# Patient Record
Sex: FEMALE | Race: WHITE | NOT HISPANIC OR LATINO | Employment: OTHER | URBAN - NONMETROPOLITAN AREA
[De-identification: names, ages, dates, MRNs, and addresses within clinical notes are randomized per-mention and may not be internally consistent; named-entity substitution may affect disease eponyms.]

---

## 2017-01-17 ENCOUNTER — APPOINTMENT (OUTPATIENT)
Dept: GENERAL RADIOLOGY | Facility: HOSPITAL | Age: 67
End: 2017-01-17

## 2017-01-17 ENCOUNTER — HOSPITAL ENCOUNTER (EMERGENCY)
Facility: HOSPITAL | Age: 67
Discharge: HOME | End: 2017-01-18
Attending: EMERGENCY MEDICINE | Admitting: EMERGENCY MEDICINE

## 2017-01-17 DIAGNOSIS — K29.00 OTHER ACUTE GASTRITIS: Primary | ICD-10-CM

## 2017-01-17 LAB
ALBUMIN SERPL-MCNC: 4.8 G/DL (ref 3.4–4.8)
ALBUMIN/GLOB SERPL: 1.4 G/DL (ref 1.5–2.5)
ALP SERPL-CCNC: 110 U/L (ref 46–116)
ALT SERPL W P-5'-P-CCNC: 10 U/L (ref 10–36)
AMYLASE SERPL-CCNC: 151 U/L (ref 28–100)
ANION GAP SERPL CALCULATED.3IONS-SCNC: 9.5 MMOL/L (ref 3.6–11.2)
ANISOCYTOSIS BLD QL: NORMAL
APTT PPP: 23.5 SECONDS (ref 24.4–31)
AST SERPL-CCNC: 23 U/L (ref 10–30)
BACTERIA UR QL AUTO: ABNORMAL /HPF
BASOPHILS # BLD AUTO: 0.05 10*3/MM3 (ref 0–0.3)
BASOPHILS NFR BLD AUTO: 0.5 % (ref 0–2)
BILIRUB SERPL-MCNC: 0.3 MG/DL (ref 0.2–1.8)
BILIRUB UR QL STRIP: NEGATIVE
BNP SERPL-MCNC: 25.7 PG/ML (ref 0–100)
BUN BLD-MCNC: 14 MG/DL (ref 7–21)
BUN/CREAT SERPL: 16.5 (ref 7–25)
CALCIUM SPEC-SCNC: 9.9 MG/DL (ref 7.7–10)
CHLORIDE SERPL-SCNC: 106 MMOL/L (ref 99–112)
CK MB SERPL-CCNC: 0.69 NG/ML (ref 0–5)
CK MB SERPL-CCNC: 0.72 NG/ML (ref 0–5)
CK MB SERPL-RTO: 1.4 % (ref 0–3)
CK MB SERPL-RTO: 1.7 % (ref 0–3)
CK SERPL-CCNC: 42 U/L (ref 24–173)
CK SERPL-CCNC: 50 U/L (ref 24–173)
CLARITY UR: CLEAR
CO2 SERPL-SCNC: 32.5 MMOL/L (ref 24.3–31.9)
COLOR UR: YELLOW
CREAT BLD-MCNC: 0.85 MG/DL (ref 0.43–1.29)
DEPRECATED RDW RBC AUTO: 47.9 FL (ref 37–54)
EOSINOPHIL # BLD AUTO: 0.08 10*3/MM3 (ref 0–0.7)
EOSINOPHIL NFR BLD AUTO: 0.8 % (ref 0–5)
ERYTHROCYTE [DISTWIDTH] IN BLOOD BY AUTOMATED COUNT: 15.1 % (ref 11.5–14.5)
GFR SERPL CREATININE-BSD FRML MDRD: 67 ML/MIN/1.73
GLOBULIN UR ELPH-MCNC: 3.4 GM/DL
GLUCOSE BLD-MCNC: 99 MG/DL (ref 70–110)
GLUCOSE UR STRIP-MCNC: NEGATIVE MG/DL
HCT VFR BLD AUTO: 41.8 % (ref 37–47)
HGB BLD-MCNC: 13.7 G/DL (ref 12–16)
HGB UR QL STRIP.AUTO: NEGATIVE
HYALINE CASTS UR QL AUTO: ABNORMAL /LPF
IMM GRANULOCYTES # BLD: 0.02 10*3/MM3 (ref 0–0.03)
IMM GRANULOCYTES NFR BLD: 0.2 % (ref 0–0.5)
INR PPP: 0.89 (ref 0.8–1.1)
KETONES UR QL STRIP: NEGATIVE
LEUKOCYTE ESTERASE UR QL STRIP.AUTO: ABNORMAL
LIPASE SERPL-CCNC: 46 U/L (ref 13–60)
LYMPHOCYTES # BLD AUTO: 2.57 10*3/MM3 (ref 1–3)
LYMPHOCYTES NFR BLD AUTO: 24.3 % (ref 21–51)
MCH RBC QN AUTO: 28.4 PG (ref 27–33)
MCHC RBC AUTO-ENTMCNC: 32.8 G/DL (ref 33–37)
MCV RBC AUTO: 86.7 FL (ref 80–94)
MONOCYTES # BLD AUTO: 0.66 10*3/MM3 (ref 0.1–0.9)
MONOCYTES NFR BLD AUTO: 6.2 % (ref 0–10)
MYOGLOBIN SERPL-MCNC: 25 NG/ML (ref 0–109)
MYOGLOBIN SERPL-MCNC: 28 NG/ML (ref 0–109)
NEUTROPHILS # BLD AUTO: 7.21 10*3/MM3 (ref 1.4–6.5)
NEUTROPHILS NFR BLD AUTO: 68 % (ref 30–70)
NITRITE UR QL STRIP: NEGATIVE
NRBC BLD MANUAL-RTO: 0 /100 WBC (ref 0–0)
OSMOLALITY SERPL CALC.SUM OF ELEC: 294.8 MOSM/KG (ref 273–305)
PH UR STRIP.AUTO: 6 [PH] (ref 5–8)
PLAT MORPH BLD: NORMAL
PLATELET # BLD AUTO: 221 10*3/MM3 (ref 130–400)
PMV BLD AUTO: 14.3 FL (ref 6–10)
POTASSIUM BLD-SCNC: 3 MMOL/L (ref 3.5–5.3)
PROT SERPL-MCNC: 8.2 G/DL (ref 6–8)
PROT UR QL STRIP: NEGATIVE
PROTHROMBIN TIME: 10.1 SECONDS (ref 9.8–11.9)
RBC # BLD AUTO: 4.82 10*6/MM3 (ref 4.2–5.4)
RBC # UR: ABNORMAL /HPF
REF LAB TEST METHOD: ABNORMAL
SODIUM BLD-SCNC: 148 MMOL/L (ref 135–153)
SP GR UR STRIP: 1.01 (ref 1–1.03)
SQUAMOUS #/AREA URNS HPF: ABNORMAL /HPF
TROPONIN I SERPL-MCNC: <0.006 NG/ML
TROPONIN I SERPL-MCNC: <0.006 NG/ML
UROBILINOGEN UR QL STRIP: ABNORMAL
WBC NRBC COR # BLD: 10.59 10*3/MM3 (ref 4.5–12.5)
WBC UR QL AUTO: ABNORMAL /HPF

## 2017-01-17 PROCEDURE — 83874 ASSAY OF MYOGLOBIN: CPT | Performed by: EMERGENCY MEDICINE

## 2017-01-17 PROCEDURE — 87086 URINE CULTURE/COLONY COUNT: CPT | Performed by: EMERGENCY MEDICINE

## 2017-01-17 PROCEDURE — 25010000002 ONDANSETRON PER 1 MG: Performed by: EMERGENCY MEDICINE

## 2017-01-17 PROCEDURE — 84484 ASSAY OF TROPONIN QUANT: CPT | Performed by: EMERGENCY MEDICINE

## 2017-01-17 PROCEDURE — 93010 ELECTROCARDIOGRAM REPORT: CPT | Performed by: INTERNAL MEDICINE

## 2017-01-17 PROCEDURE — 82553 CREATINE MB FRACTION: CPT | Performed by: EMERGENCY MEDICINE

## 2017-01-17 PROCEDURE — 36415 COLL VENOUS BLD VENIPUNCTURE: CPT

## 2017-01-17 PROCEDURE — 80053 COMPREHEN METABOLIC PANEL: CPT | Performed by: EMERGENCY MEDICINE

## 2017-01-17 PROCEDURE — 71010 XR CHEST 1 VW: CPT | Performed by: RADIOLOGY

## 2017-01-17 PROCEDURE — 85610 PROTHROMBIN TIME: CPT | Performed by: EMERGENCY MEDICINE

## 2017-01-17 PROCEDURE — 93005 ELECTROCARDIOGRAM TRACING: CPT | Performed by: EMERGENCY MEDICINE

## 2017-01-17 PROCEDURE — 83690 ASSAY OF LIPASE: CPT | Performed by: EMERGENCY MEDICINE

## 2017-01-17 PROCEDURE — 82150 ASSAY OF AMYLASE: CPT | Performed by: EMERGENCY MEDICINE

## 2017-01-17 PROCEDURE — 85730 THROMBOPLASTIN TIME PARTIAL: CPT | Performed by: EMERGENCY MEDICINE

## 2017-01-17 PROCEDURE — 85007 BL SMEAR W/DIFF WBC COUNT: CPT | Performed by: EMERGENCY MEDICINE

## 2017-01-17 PROCEDURE — 71010 HC CHEST PA OR AP: CPT

## 2017-01-17 PROCEDURE — 82550 ASSAY OF CK (CPK): CPT | Performed by: EMERGENCY MEDICINE

## 2017-01-17 PROCEDURE — 99285 EMERGENCY DEPT VISIT HI MDM: CPT

## 2017-01-17 PROCEDURE — 85025 COMPLETE CBC W/AUTO DIFF WBC: CPT | Performed by: EMERGENCY MEDICINE

## 2017-01-17 PROCEDURE — 96374 THER/PROPH/DIAG INJ IV PUSH: CPT

## 2017-01-17 PROCEDURE — 81001 URINALYSIS AUTO W/SCOPE: CPT | Performed by: EMERGENCY MEDICINE

## 2017-01-17 PROCEDURE — 83880 ASSAY OF NATRIURETIC PEPTIDE: CPT | Performed by: EMERGENCY MEDICINE

## 2017-01-17 RX ORDER — PHENOBARBITAL, HYOSCYAMINE SULFATE, ATROPINE SULFATE AND SCOPOLAMINE HYDROBROMIDE .0194; .1037; 16.2; .0065 MG/1; MG/1; MG/1; MG/1
1 TABLET ORAL ONCE
Status: COMPLETED | OUTPATIENT
Start: 2017-01-17 | End: 2017-01-17

## 2017-01-17 RX ORDER — SODIUM CHLORIDE 0.9 % (FLUSH) 0.9 %
10 SYRINGE (ML) INJECTION AS NEEDED
Status: DISCONTINUED | OUTPATIENT
Start: 2017-01-17 | End: 2017-01-18 | Stop reason: HOSPADM

## 2017-01-17 RX ORDER — MAGNESIUM HYDROXIDE/ALUMINUM HYDROXICE/SIMETHICONE 120; 1200; 1200 MG/30ML; MG/30ML; MG/30ML
30 SUSPENSION ORAL ONCE
Status: COMPLETED | OUTPATIENT
Start: 2017-01-17 | End: 2017-01-17

## 2017-01-17 RX ORDER — ONDANSETRON 2 MG/ML
4 INJECTION INTRAMUSCULAR; INTRAVENOUS ONCE
Status: COMPLETED | OUTPATIENT
Start: 2017-01-17 | End: 2017-01-17

## 2017-01-17 RX ORDER — ASPIRIN 81 MG/1
324 TABLET, CHEWABLE ORAL ONCE
Status: COMPLETED | OUTPATIENT
Start: 2017-01-17 | End: 2017-01-17

## 2017-01-17 RX ADMIN — ASPIRIN 324 MG: 81 TABLET, CHEWABLE ORAL at 20:25

## 2017-01-17 RX ADMIN — PHENOBARBITAL, HYOSCYAMINE SULFATE, ATROPINE SULFATE, SCOPOLAMINE HYDROBROMIDE 16.2 MG: 16.2; .1037; .0194; .0065 TABLET ORAL at 20:26

## 2017-01-17 RX ADMIN — LIDOCAINE HYDROCHLORIDE 15 ML: 20 SOLUTION ORAL; TOPICAL at 20:26

## 2017-01-17 RX ADMIN — ALUMINUM HYDROXIDE, MAGNESIUM HYDROXIDE, AND SIMETHICONE 30 ML: 200; 200; 20 SUSPENSION ORAL at 20:25

## 2017-01-17 RX ADMIN — ONDANSETRON 4 MG: 2 INJECTION, SOLUTION INTRAMUSCULAR; INTRAVENOUS at 20:25

## 2017-01-18 VITALS
TEMPERATURE: 98.9 F | SYSTOLIC BLOOD PRESSURE: 133 MMHG | RESPIRATION RATE: 18 BRPM | WEIGHT: 134 LBS | DIASTOLIC BLOOD PRESSURE: 85 MMHG | BODY MASS INDEX: 26.31 KG/M2 | OXYGEN SATURATION: 97 % | HEART RATE: 75 BPM | HEIGHT: 60 IN

## 2017-01-18 RX ORDER — FAMOTIDINE 20 MG/1
20 TABLET, FILM COATED ORAL NIGHTLY
Qty: 30 TABLET | Refills: 0 | Status: SHIPPED | OUTPATIENT
Start: 2017-01-18 | End: 2017-05-16 | Stop reason: SDDI

## 2017-01-18 NOTE — ED PROVIDER NOTES
Subjective   HPI Comments: Patient comes in with complaint of shortness of breath and chest pain.  She has had malaise fatigue and shortness of breath for the past week.  She grew concerned when she started having  Epigastric chest pain.  It does radiate through to her left scapula.  Patient notes that she has had a fever of 98 or 99.  She denies cough.  She denies nausea vomiting diarrhea.  She denies injury.    Patient is a 66 y.o. female presenting with chest pain.   History provided by:  Patient  Chest Pain   Pain location:  L chest  Pain quality: aching    Pain radiates to:  Does not radiate  Onset quality:  Sudden  Progression:  Worsening  Chronicity:  Recurrent  Context: not breathing, not drug use, not eating, not intercourse, not lifting, not movement, not raising an arm, not at rest, not stress and not trauma    Relieved by:  Nothing  Worsened by:  Nothing  Ineffective treatments:  None tried  Associated symptoms: fatigue, nausea, shortness of breath and weakness    Associated symptoms: no abdominal pain, no AICD problem, no altered mental status, no anorexia, no anxiety, no back pain, no claudication, no cough, no diaphoresis, no dizziness, no dysphagia, no fever, no headache, no heartburn, no lower extremity edema, no near-syncope, no numbness, no orthopnea, no palpitations, no PND, no syncope and no vomiting    Risk factors: coronary artery disease, diabetes mellitus, high cholesterol and hypertension    Risk factors: no aortic disease, no birth control, no Ranjith-Danlos syndrome, no immobilization, not male, no Marfan's syndrome, not obese, not pregnant, no prior DVT/PE, no smoking and no surgery        Review of Systems   Constitutional: Positive for fatigue. Negative for diaphoresis and fever.   HENT: Negative.  Negative for trouble swallowing.    Eyes: Negative.    Respiratory: Positive for shortness of breath. Negative for cough and chest tightness.    Cardiovascular: Positive for chest pain.  Negative for palpitations, orthopnea, claudication, syncope, PND and near-syncope.   Gastrointestinal: Positive for nausea. Negative for abdominal pain, anorexia, heartburn and vomiting.   Endocrine: Negative.    Genitourinary: Negative.    Musculoskeletal: Negative.  Negative for back pain.   Skin: Negative.    Allergic/Immunologic: Negative.    Neurological: Positive for weakness. Negative for dizziness, numbness and headaches.   Hematological: Negative.    Psychiatric/Behavioral: Negative.        Past Medical History   Diagnosis Date   • Aortic insufficiency    • Disease of thyroid gland    • Essential hypertension    • SOB (shortness of breath)        Allergies   Allergen Reactions   • Eggs Or Egg-Derived Products    • Iron        Past Surgical History   Procedure Laterality Date   • Thyroid surgery       age 16+ 17, for cysts   •  section       x 3    • Cardiac catheterization     • Cardiac catheterization  2015       Family History   Problem Relation Age of Onset   • Lung cancer Mother    • Stroke Father        Social History     Social History   • Marital status:      Spouse name: N/A   • Number of children: N/A   • Years of education: N/A     Social History Main Topics   • Smoking status: Never Smoker   • Smokeless tobacco: Never Used   • Alcohol use No   • Drug use: No   • Sexual activity: Not Asked     Other Topics Concern   • None     Social History Narrative           Objective   Physical Exam   Constitutional: She is oriented to person, place, and time. She appears well-developed and well-nourished. No distress.   HENT:   Head: Normocephalic and atraumatic.   Right Ear: External ear normal.   Nose: Nose normal.   Mouth/Throat: Oropharynx is clear and moist.   Eyes: Conjunctivae and EOM are normal. Right eye exhibits no discharge. Left eye exhibits no discharge. No scleral icterus.   Neck: Normal range of motion. No tracheal deviation present.   Cardiovascular: Normal rate, regular  rhythm and normal heart sounds.  Exam reveals no gallop and no friction rub.    No murmur heard.  Pulmonary/Chest: Effort normal and breath sounds normal. No stridor. No respiratory distress. She has no wheezes. She has no rales.   Abdominal: Soft. Bowel sounds are normal. She exhibits no distension and no mass. There is no tenderness. There is no guarding.   Musculoskeletal: Normal range of motion. She exhibits no edema, tenderness or deformity.   Lymphadenopathy:     She has no cervical adenopathy.   Neurological: She is alert and oriented to person, place, and time. She exhibits normal muscle tone. Coordination normal.   Skin: Skin is warm and dry. No pallor.   Psychiatric: Her behavior is normal. Judgment and thought content normal.   anxious   Nursing note and vitals reviewed.      Procedures         ED Course  ED Course   Value Comment By Time   ECG 12 Lead 12-lead EKG performed at 1959 hrs.  Interpreted by myself at 2002 hrs.  Sinus tachycardia.  100 bpm.  SC interval 177.  QRS duration 145.  QTc 460.  Left ventricular hypertrophy.  No pathologic blocks.  No overt dysrhythmia.  No evidence for STEMI. Los Davis MD 01/18 0144    Patient hemodynamically stable.  No diagnostic EKG nor serial cardiac enzyme results. Los Davis MD 01/18 0146      XR Chest 1 View   ED Interpretation   Single AP portable chest x-ray   My read   Mild vascular congestion.  No apparent acute infiltrate nor   injury.        Labs Reviewed   COMPREHENSIVE METABOLIC PANEL - Abnormal; Notable for the following:        Result Value    Potassium 3.0 (*)     CO2 32.5 (*)     Total Protein 8.2 (*)     A/G Ratio 1.4 (*)     All other components within normal limits   APTT - Abnormal; Notable for the following:     PTT 23.5 (*)     All other components within normal limits    Narrative:     Heparin protocol:    Therapeutic range 56-80 seconds   URINALYSIS W/ CULTURE IF INDICATED - Abnormal; Notable for the following:      Leuk Esterase, UA Trace (*)     All other components within normal limits   CBC WITH AUTO DIFFERENTIAL - Abnormal; Notable for the following:     MCHC 32.8 (*)     RDW 15.1 (*)     MPV 14.3 (*)     Neutrophils, Absolute 7.21 (*)     All other components within normal limits   AMYLASE - Abnormal; Notable for the following:     Amylase 151 (*)     All other components within normal limits   URINALYSIS, MICROSCOPIC ONLY - Abnormal; Notable for the following:     RBC, UA 0-2 (*)     WBC, UA 0-2 (*)     Bacteria, UA 1+ (*)     All other components within normal limits   PROTIME-INR - Normal    Narrative:     Patients not on anticoagulant therapy:    INR 0.90-1.10     Suggested INR therapeutic range for stable oral anticoagulant therapy:             Routine therapy                      2.00-3.00           Recurrent MI                         2.50-3.50           Mechanical prosthetic valve          2.50-3.50   CK - Normal   MYOGLOBIN, SERUM - Normal   CK MB - Normal   TROPONIN (IN-HOUSE) - Normal    Narrative:     Ultra Troponin I Reference Range:         <=0.039 ng/mL: Negative    0.04-0.779 ng/mL: Indeterminate Range. Suspicious of MI.  Clinical correlation required.       >=0.78  ng/mL: Consistent with myocardial injury.  Clinical correlation required.   BNP (IN-HOUSE) - Normal   LIPASE - Normal   OSMOLALITY, CALCULATED - Normal   CKMB INDEX CALCULATION - Normal   CK - Normal   MYOGLOBIN, SERUM - Normal   CK MB - Normal   TROPONIN (IN-HOUSE) - Normal    Narrative:     Ultra Troponin I Reference Range:         <=0.039 ng/mL: Negative    0.04-0.779 ng/mL: Indeterminate Range. Suspicious of MI.  Clinical correlation required.       >=0.78  ng/mL: Consistent with myocardial injury.  Clinical correlation required.   CKMB INDEX CALCULATION - Normal   URINE CULTURE   SCAN SLIDE   CBC AND DIFFERENTIAL    Narrative:     The following orders were created for panel order CBC & Differential.  Procedure                                Abnormality         Status                     ---------                               -----------         ------                     Scan Slide[[id number]]                                        Final result               CBC Auto Differential[[id number]]         Abnormal            Final result                 Please view results for these tests on the individual orders.        Medication List      START taking these medications          esomeprazole 20 MG capsule   Commonly known as:  NEXIUM   Take 1 capsule by mouth Every Morning Before Breakfast.       famotidine 20 MG tablet   Commonly known as:  PEPCID   Take 1 tablet by mouth Every Night.         CONTINUE taking these medications          cetirizine 10 MG tablet   Commonly known as:  zyrTEC       fluticasone-salmeterol 250-50 MCG/DOSE DISKUS   Commonly known as:  ADVAIR       hydrochlorothiazide 12.5 MG tablet   Commonly known as:  HYDRODIURIL       levothyroxine 25 MCG tablet   Commonly known as:  SYNTHROID, LEVOTHROID       losartan 50 MG tablet   Commonly known as:  COZAAR       metoprolol tartrate 25 MG tablet   Commonly known as:  LOPRESSOR       omeprazole 20 MG capsule   Commonly known as:  priLOSEC       pravastatin 10 MG tablet   Commonly known as:  PRAVACHOL               HEART Score  History: Slightly suspicious (+0)  ECG: Normal (+0)  Age: Greater than or equal to 65 (+2)  Risk Factors: 1 - 2 risk factors (+1)  Troponin: Normal limit or lower (+0)  Total: 3         MDM  Number of Diagnoses or Management Options  Other acute gastritis: new and requires workup     Amount and/or Complexity of Data Reviewed  Clinical lab tests: ordered and reviewed  Tests in the radiology section of CPT®: ordered and reviewed  Obtain history from someone other than the patient: yes  Independent visualization of images, tracings, or specimens: yes    Risk of Complications, Morbidity, and/or Mortality  Presenting problems: high  Diagnostic procedures: high  Management  options: moderate    Patient Progress  Patient progress: stable      Final diagnoses:   Other acute gastritis            Los Davis MD  01/18/17 0615

## 2017-01-19 ENCOUNTER — TELEPHONE (OUTPATIENT)
Dept: CARDIOLOGY | Facility: CLINIC | Age: 67
End: 2017-01-19

## 2017-01-19 NOTE — TELEPHONE ENCOUNTER
----- Message from John Banda MD sent at 1/19/2017 12:25 PM EST -----  That's okay  ----- Message -----     From: Lilo Posada MA     Sent: 1/19/2017  11:34 AM       To: John Banda MD    Pt wants to know if she can come in today and be seen? She states she went to the ER two days ago by ambulance with chest pain and they sent her home with Nexium and her pain is still consistent.

## 2017-01-20 ENCOUNTER — OFFICE VISIT (OUTPATIENT)
Dept: CARDIOLOGY | Facility: CLINIC | Age: 67
End: 2017-01-20

## 2017-01-20 ENCOUNTER — TELEPHONE (OUTPATIENT)
Dept: CARDIOLOGY | Facility: CLINIC | Age: 67
End: 2017-01-20

## 2017-01-20 VITALS
WEIGHT: 138.6 LBS | OXYGEN SATURATION: 98 % | HEIGHT: 60 IN | HEART RATE: 72 BPM | BODY MASS INDEX: 27.21 KG/M2 | DIASTOLIC BLOOD PRESSURE: 84 MMHG | SYSTOLIC BLOOD PRESSURE: 154 MMHG

## 2017-01-20 DIAGNOSIS — K85.00 IDIOPATHIC ACUTE PANCREATITIS, UNSPECIFIED COMPLICATION STATUS: ICD-10-CM

## 2017-01-20 DIAGNOSIS — I44.7 LBBB (LEFT BUNDLE BRANCH BLOCK): ICD-10-CM

## 2017-01-20 DIAGNOSIS — E03.4 HYPOTHYROIDISM DUE TO ACQUIRED ATROPHY OF THYROID: ICD-10-CM

## 2017-01-20 DIAGNOSIS — E87.6 HYPOKALEMIA: ICD-10-CM

## 2017-01-20 DIAGNOSIS — R07.2 PRECORDIAL PAIN: ICD-10-CM

## 2017-01-20 DIAGNOSIS — I10 ESSENTIAL HYPERTENSION: ICD-10-CM

## 2017-01-20 DIAGNOSIS — R07.9 CHEST PAIN IN ADULT: Primary | ICD-10-CM

## 2017-01-20 DIAGNOSIS — I38 VALVULAR HEART DISEASE: ICD-10-CM

## 2017-01-20 DIAGNOSIS — E78.2 MIXED HYPERLIPIDEMIA: ICD-10-CM

## 2017-01-20 LAB
BACTERIA SPEC AEROBE CULT: NORMAL
CHOLEST SERPL-MCNC: 219 MG/DL (ref 0–200)
CK SERPL-CCNC: 53 U/L (ref 24–173)
HDLC SERPL-MCNC: 87 MG/DL (ref 60–100)
LDLC SERPL CALC-MCNC: 110 MG/DL (ref 0–100)
LDLC/HDLC SERPL: 1.27 {RATIO}
LIPASE SERPL-CCNC: 83 U/L (ref 13–60)
T4 FREE SERPL-MCNC: 1.51 NG/DL (ref 0.89–1.76)
TRIGL SERPL-MCNC: 108 MG/DL (ref 0–150)
TROPONIN I SERPL-MCNC: <0.006 NG/ML
TSH SERPL DL<=0.05 MIU/L-ACNC: 1.27 MIU/ML (ref 0.55–4.78)
VLDLC SERPL-MCNC: 21.6 MG/DL

## 2017-01-20 PROCEDURE — 84443 ASSAY THYROID STIM HORMONE: CPT | Performed by: INTERNAL MEDICINE

## 2017-01-20 PROCEDURE — 84484 ASSAY OF TROPONIN QUANT: CPT | Performed by: INTERNAL MEDICINE

## 2017-01-20 PROCEDURE — 93000 ELECTROCARDIOGRAM COMPLETE: CPT | Performed by: INTERNAL MEDICINE

## 2017-01-20 PROCEDURE — 83690 ASSAY OF LIPASE: CPT | Performed by: INTERNAL MEDICINE

## 2017-01-20 PROCEDURE — 99214 OFFICE O/P EST MOD 30 MIN: CPT | Performed by: INTERNAL MEDICINE

## 2017-01-20 PROCEDURE — 82550 ASSAY OF CK (CPK): CPT | Performed by: INTERNAL MEDICINE

## 2017-01-20 PROCEDURE — 84439 ASSAY OF FREE THYROXINE: CPT | Performed by: INTERNAL MEDICINE

## 2017-01-20 PROCEDURE — 80061 LIPID PANEL: CPT | Performed by: INTERNAL MEDICINE

## 2017-01-20 RX ORDER — LOSARTAN POTASSIUM 25 MG/1
25 TABLET ORAL DAILY
Qty: 30 TABLET | Refills: 2 | Status: SHIPPED | OUTPATIENT
Start: 2017-01-20 | End: 2017-02-28

## 2017-01-20 RX ORDER — POTASSIUM CHLORIDE 750 MG/1
10 TABLET, FILM COATED, EXTENDED RELEASE ORAL DAILY
Qty: 30 TABLET | Refills: 0 | Status: SHIPPED | OUTPATIENT
Start: 2017-01-20 | End: 2017-05-16

## 2017-01-20 RX ORDER — PRAVASTATIN SODIUM 20 MG
20 TABLET ORAL DAILY
Qty: 90 TABLET | Refills: 0 | Status: SHIPPED | OUTPATIENT
Start: 2017-01-20 | End: 2017-02-28

## 2017-01-20 RX ORDER — LEVOTHYROXINE SODIUM 0.03 MG/1
25 TABLET ORAL DAILY
Qty: 90 TABLET | Refills: 0 | Status: SHIPPED | OUTPATIENT
Start: 2017-01-20 | End: 2017-04-28 | Stop reason: SDUPTHER

## 2017-01-20 RX ORDER — HYDROCHLOROTHIAZIDE 12.5 MG/1
12.5 TABLET ORAL DAILY
Qty: 90 TABLET | Refills: 0 | Status: CANCELLED | OUTPATIENT
Start: 2017-01-20

## 2017-01-20 NOTE — MR AVS SNAPSHOT
Chasity Campos   1/20/2017 9:15 AM   Office Visit    Dept Phone:  [phone]   Encounter #:  [id number]    Provider:  John Banda MD   Department:  [facility] CARDIOLOGY                Your Full Care Plan              Today's Medication Changes          These changes are accurate as of: 1/20/17 10:18 AM.  If you have any questions, ask your nurse or doctor.               New Medication(s)Ordered:     potassium chloride 10 MEQ CR tablet   Commonly known as:  K-DUR   Take 1 tablet by mouth Daily.   Started by:  John Banda MD         Medication(s)that have changed:     losartan 25 MG tablet   Commonly known as:  COZAAR   Take 1 tablet by mouth Daily.   What changed:    - medication strength  - how much to take  - when to take this   Changed by:  John Banda MD         Stop taking medication(s)listed here:     hydrochlorothiazide 12.5 MG tablet   Commonly known as:  HYDRODIURIL   Stopped by:  John Banda MD                Where to Get Your Medications      These medications were sent to [facility] [address] - [phone]  - [phone] [address] [address]     Phone:  [phone]     levothyroxine 25 MCG tablet    losartan 25 MG tablet    potassium chloride 10 MEQ CR tablet                  Your Updated Medication List          This list is accurate as of: 1/20/17 10:18 AM.  Always use your most recent med list.                cetirizine 10 MG tablet   Commonly known as:  zyrTEC       esomeprazole 20 MG capsule   Commonly known as:  NEXIUM   Take 1 capsule by mouth Every Morning Before Breakfast.       famotidine 20 MG tablet   Commonly known as:  PEPCID   Take 1 tablet by mouth Every Night.       fluticasone-salmeterol 250-50 MCG/DOSE DISKUS   Commonly known as:  ADVAIR       levothyroxine 25 MCG tablet   Commonly known as:   SYNTHROID, LEVOTHROID   Take 1 tablet by mouth Daily.       losartan 25 MG tablet   Commonly known as:  COZAAR   Take 1 tablet by mouth Daily.       metoprolol tartrate 25 MG tablet   Commonly known as:  LOPRESSOR       omeprazole 20 MG capsule   Commonly known as:  priLOSEC       potassium chloride 10 MEQ CR tablet   Commonly known as:  K-DUR   Take 1 tablet by mouth Daily.       pravastatin 10 MG tablet   Commonly known as:  PRAVACHOL               We Performed the Following     CK     ECG 12 Lead     Lipase     Lipid Panel     T4, Free     Troponin     TSH       You Were Diagnosed With        Codes Comments    Chest pain in adult    -  Primary ICD-10-CM: R07.9  ICD-9-CM: 786.50     Mixed hyperlipidemia     ICD-10-CM: E78.2  ICD-9-CM: 272.2     Essential hypertension     ICD-10-CM: I10  ICD-9-CM: 401.9     Valvular heart disease     ICD-10-CM: I38  ICD-9-CM: 424.90     LBBB (left bundle branch block)     ICD-10-CM: I44.7  ICD-9-CM: 426.3     Hypothyroidism due to acquired atrophy of thyroid     ICD-10-CM: E03.4  ICD-9-CM: 244.8, 246.8     Idiopathic acute pancreatitis, unspecified complication status     ICD-10-CM: K85.00  ICD-9-CM: 577.0     Precordial pain     ICD-10-CM: R07.2  ICD-9-CM: 786.51     Hypokalemia     ICD-10-CM: E87.6  ICD-9-CM: 276.8       Instructions     None    Patient Instructions History      Upcoming Appointments     Visit Type Date Time Department    SAME DAY 2017  9:15 AM [facility] [place]    FOLLOW UP 2017  2:00 PM [facility] [place]ERNANDA      [facility] Signup     [facility] [employer] allows you to send messages to your doctor, view your test results, renew your prescriptions, schedule appointments, and more. To sign up, go to [employer] and click on the Sign Up Now link in the New User? box. Enter your [employer] Activation Code exactly as it appears below along with the last four digits of your Social Security Number and your Date of Birth () to complete the  "sign-up process. If you do not sign up before the expiration date, you must request a new code.    [employer] Activation Code: [email or web address]  Expires: 2/1/2017  1:49 AM    If you have questions, you can email [email or web address] or call [phone] to talk to our [employer] staff. Remember, [employer] is NOT to be used for urgent needs. For medical emergencies, dial 911.               Other Info from Your Visit           Your Appointments     Feb 28, 2017  2:00 PM EST   Follow Up with John Banda MD   [facility] CARDIOLOGY (--)    [address] [place] [address] [facility] [address] [phone]           Arrive 15 minutes prior to appointment.              Allergies     Eggs Or Egg-derived Products      Iron        Reason for Visit     Chest Pain     Hypertension           Vital Signs     Blood Pressure Pulse Height Weight Oxygen Saturation Body Mass Index    154/84 (BP Location: Left arm, Patient Position: Sitting) 72 60\" (152.4 cm) 138 lb 9.6 oz (62.9 kg) 98% 27.07 kg/m2    Smoking Status                   Never Smoker           Problems and Diagnoses Noted     High blood pressure    High cholesterol or triglycerides    Hypokalemia    Underactive thyroid    LBBB (left bundle branch block)    Valvular heart disease    Chest pain in adult    -  Primary    Idiopathic acute pancreatitis        Precordial chest pain          Results         "

## 2017-01-20 NOTE — PROGRESS NOTES
subjective     Chief Complaint   Patient presents with   • Chest Pain   • Hypertension     History of Present Illness  Chest pain  Patient is 66 years old white female who states that she has been having pain in the lower sternal area with radiation to the back.  It is followed by mild nausea and sometimes she feels cold and sweating.  She went to the emergency room where EKG and cardiac enzymes were normal.  Lab work apparently showed elevated the amylase at a very low potassium but nothing was done.  No change in medications patient was told that everything is okay.  She presents here today for follow-up.  Patient states that she is feeling quite a bit better now.  There is no nausea still has some chest pain which gets worse with exertion.  Appetite is good there is no abdominal discomfort she able to eat anything she wants to there is no nausea vomiting and bowel movements are normal.      Hypertension  Patient is taking hydrochlorothiazide and Lopressor  Blood pressure is running high.  Patient does not check it regularly.  With hydrochlorothiazide patient is having hypokalemia by ER report    Hypokalemia  Patient complains of leg cramps  K was 3.0   probably related to hydrochlorothiazide    Hyperlipidemia  Chasity Campos has long-standing history of hyperlipidemia.  Has been trying to lose weight and trying to follow diet and activity recommandations.  Patient is tolerating medications very well.  There has been no side effects.  Latest lipid levels have been fluctuating.  Patient has a moderate mitral regurgitation chronic left bundle branch block and hypothyroidism.    Patient Active Problem List   Diagnosis   • Essential hypertension   • Hypothyroidism   • Gastroesophageal reflux disease without esophagitis   • Hyperlipidemia   • LBBB (left bundle branch block)   • Valvular heart disease trace AI, Mod MR   • Hypokalemia       Social History   Substance Use Topics   • Smoking status: Never Smoker   •  Smokeless tobacco: Never Used   • Alcohol use No       Allergies   Allergen Reactions   • Eggs Or Egg-Derived Products    • Iron          Current Outpatient Prescriptions:   •  cetirizine (ZyrTEC) 10 MG tablet, Take 10 mg by mouth daily., Disp: , Rfl:   •  esomeprazole (NEXIUM) 20 MG capsule, Take 1 capsule by mouth Every Morning Before Breakfast., Disp: 30 capsule, Rfl: 0  •  famotidine (PEPCID) 20 MG tablet, Take 1 tablet by mouth Every Night., Disp: 30 tablet, Rfl: 0  •  fluticasone-salmeterol (ADVAIR) 250-50 MCG/DOSE DISKUS, Inhale 2 (two) times a day., Disp: , Rfl:   •  levothyroxine (SYNTHROID, LEVOTHROID) 25 MCG tablet, Take 1 tablet by mouth Daily., Disp: 90 tablet, Rfl: 0  •  metoprolol tartrate (LOPRESSOR) 25 MG tablet, Take 25 mg by mouth daily., Disp: , Rfl:   •  omeprazole (PriLOSEC) 20 MG capsule, Take 20 mg by mouth daily., Disp: , Rfl:   •  pravastatin (PRAVACHOL) 10 MG tablet, Take 10 mg by mouth daily., Disp: , Rfl:   •  losartan (COZAAR) 25 MG tablet, Take 1 tablet by mouth Daily., Disp: 30 tablet, Rfl: 2  •  potassium chloride (K-DUR) 10 MEQ CR tablet, Take 1 tablet by mouth Daily., Disp: 30 tablet, Rfl: 0      The following portions of the patient's history were reviewed and updated as appropriate: allergies, current medications, past family history, past medical history, past social history, past surgical history and problem list.    Review of Systems   Constitution: Positive for weakness and malaise/fatigue. Negative for chills, decreased appetite, diaphoresis, fever, weight gain and weight loss.   HENT: Negative.    Eyes: Negative.    Cardiovascular: Positive for chest pain and dyspnea on exertion. Negative for orthopnea, palpitations, paroxysmal nocturnal dyspnea and syncope.   Respiratory: Negative.    Hematologic/Lymphatic: Negative.    Skin: Negative.    Musculoskeletal: Negative.    Gastrointestinal: Positive for nausea. Negative for abdominal pain, anorexia, change in bowel habit,  "constipation and diarrhea.   Genitourinary: Negative.    Psychiatric/Behavioral: Negative.           Objective:     Visit Vitals   • /84 (BP Location: Left arm, Patient Position: Sitting)   • Pulse 72   • Ht 60\" (152.4 cm)   • Wt 138 lb 9.6 oz (62.9 kg)   • SpO2 98%   • BMI 27.07 kg/m2     Physical Exam   Constitutional: She appears well-developed and well-nourished.   HENT:   Head: Normocephalic and atraumatic.   Mouth/Throat: Oropharynx is clear and moist.   Eyes: Conjunctivae and EOM are normal. Pupils are equal, round, and reactive to light. No scleral icterus.   Neck: Normal range of motion. Neck supple. No JVD present. No tracheal deviation present. No thyromegaly present.   Cardiovascular: Normal rate, regular rhythm, normal heart sounds and intact distal pulses.  Exam reveals no friction rub.    No murmur heard.  Pulmonary/Chest: Effort normal and breath sounds normal. No respiratory distress. She has no wheezes. She has no rales. She exhibits no tenderness.   Abdominal: Soft. Bowel sounds are normal. She exhibits no distension and no mass. There is no tenderness. There is no rebound and no guarding.   Musculoskeletal: Normal range of motion. She exhibits no edema, tenderness or deformity.   Lymphadenopathy:     She has no cervical adenopathy.   Neurological: She is alert. She has normal reflexes. No cranial nerve deficit. She exhibits normal muscle tone. Coordination normal.   Skin: Skin is warm and dry.   Psychiatric: She has a normal mood and affect. Her behavior is normal. Judgment and thought content normal.         Lab Review  Lab Results   Component Value Date     01/17/2017    K 3.0 (L) 01/17/2017     01/17/2017    BUN 14 01/17/2017    CREATININE 0.85 01/17/2017    GLUCOSE 99 01/17/2017    CALCIUM 9.9 01/17/2017    ALT 10 01/17/2017    ALKPHOS 110 01/17/2017    LABIL2 1.4 (L) 01/17/2017     Lab Results   Component Value Date    CKTOTAL 42 01/17/2017     Lab Results   Component Value " Date    WBC 10.59 01/17/2017    HGB 13.7 01/17/2017    HCT 41.8 01/17/2017     01/17/2017     Lab Results   Component Value Date    INR 0.89 01/17/2017    INR 0.96 07/23/2015     Lab Results   Component Value Date    MG 2.0 07/23/2015     Lab Results   Component Value Date    TSH 1.939 05/19/2015     Lab Results   Component Value Date    BNP 25.7 01/17/2017     Lab Results   Component Value Date    CHLPL 192 05/19/2015     Lab Results   Component Value Date    TRIG 94 05/19/2015    HDL 95 05/19/2015    VLDL 19 05/19/2015           ECG 12 Lead  Date/Time: 1/20/2017 12:41 PM  Performed by: ANGIE BARAHONA  Authorized by: ANGIE BARAHONA   Comparison: compared with previous ECG from 1/17/2017  Similar to previous ECG  Rhythm: sinus rhythm  Rate: normal  Conduction: complete LBBB  QRS axis: normal  Clinical impression: abnormal ECG  Comments: Left bundle branch block with secondary ST and T changes             I personally viewed and interpreted the patient's LAB data         Assessment:     1. Chest pain in adult    2. Mixed hyperlipidemia    3. Essential hypertension    4. Valvular heart disease trace AI, Mod MR    5. LBBB (left bundle branch block)    6. Hypothyroidism due to acquired atrophy of thyroid    7. Idiopathic acute pancreatitis, probably resolved    8. Precordial pain    9. Hypokalemia          Plan:      Chest pain has typical and atypical features is probably noncardiac however is very difficult to tell from her EKG because she has chronic left bundle branch block.  Recently she was at the hospital cardiac enzymes were normal.  We will check another CPK and arrange for further cardiac workup including Lexiscan stress test and echocardiogram.    Hypokalemia  Probably related to hydrochlorothiazide.  We will stop hydrochlorothiazide and temporarily patient will receive K Dur 10 daily    Hypertension is uncontrolled patient was started on losartan 25 mg daily and will increase the dose  as needed    Elevated amylase we will repeat.  Patient is currently asymptomatic is no abdominal tenderness no nausea or vomiting  Follow-up after the tests are done.        Return in about 1 month (around 2/20/2017).

## 2017-01-20 NOTE — TELEPHONE ENCOUNTER
Increased cholesterol medication pravastatin 20 mg daily   Blood work does show low-grade pancreatitis which is getting better   Continue with the test as scheduled

## 2017-02-10 ENCOUNTER — HOSPITAL ENCOUNTER (OUTPATIENT)
Dept: NUCLEAR MEDICINE | Facility: HOSPITAL | Age: 67
Discharge: HOME | End: 2017-02-10
Attending: INTERNAL MEDICINE

## 2017-02-10 ENCOUNTER — HOSPITAL ENCOUNTER (OUTPATIENT)
Dept: CARDIOLOGY | Facility: HOSPITAL | Age: 67
Discharge: HOME | End: 2017-02-10
Attending: INTERNAL MEDICINE

## 2017-02-10 DIAGNOSIS — R07.2 PRECORDIAL PAIN: ICD-10-CM

## 2017-02-10 DIAGNOSIS — I44.7 LBBB (LEFT BUNDLE BRANCH BLOCK): ICD-10-CM

## 2017-02-10 LAB
BH CV ECHO MEAS - % IVS THICK: 16.4 %
BH CV ECHO MEAS - % LVPW THICK: 77.4 %
BH CV ECHO MEAS - ACS: 1.7 CM
BH CV ECHO MEAS - AO ROOT AREA (BSA CORRECTED): 1.7
BH CV ECHO MEAS - AO ROOT AREA: 5.9 CM^2
BH CV ECHO MEAS - AO ROOT DIAM: 2.7 CM
BH CV ECHO MEAS - BSA(HAYCOCK): 1.7 M^2
BH CV ECHO MEAS - BSA: 1.6 M^2
BH CV ECHO MEAS - BZI_BMI: 26.1 KILOGRAMS/M^2
BH CV ECHO MEAS - BZI_METRIC_HEIGHT: 154.9 CM
BH CV ECHO MEAS - BZI_METRIC_WEIGHT: 62.6 KG
BH CV ECHO MEAS - CONTRAST EF 4CH: 58.9 ML/M^2
BH CV ECHO MEAS - EDV(CUBED): 92.1 ML
BH CV ECHO MEAS - EDV(MOD-SP4): 73 ML
BH CV ECHO MEAS - EDV(TEICH): 93.2 ML
BH CV ECHO MEAS - EF(CUBED): 68.5 %
BH CV ECHO MEAS - EF(MOD-SP4): 58.9 %
BH CV ECHO MEAS - EF(TEICH): 60.2 %
BH CV ECHO MEAS - ESV(CUBED): 29 ML
BH CV ECHO MEAS - ESV(MOD-SP4): 30 ML
BH CV ECHO MEAS - ESV(TEICH): 37.1 ML
BH CV ECHO MEAS - FS: 32 %
BH CV ECHO MEAS - IVS/LVPW: 1.4
BH CV ECHO MEAS - IVSD: 1.2 CM
BH CV ECHO MEAS - IVSS: 1.4 CM
BH CV ECHO MEAS - LA DIMENSION: 4.1 CM
BH CV ECHO MEAS - LA/AO: 1.5
BH CV ECHO MEAS - LV DIASTOLIC VOL/BSA (35-75): 45.2 ML/M^2
BH CV ECHO MEAS - LV MASS(C)D: 161.1 GRAMS
BH CV ECHO MEAS - LV MASS(C)DI: 99.8 GRAMS/M^2
BH CV ECHO MEAS - LV MASS(C)S: 156.7 GRAMS
BH CV ECHO MEAS - LV MASS(C)SI: 97.1 GRAMS/M^2
BH CV ECHO MEAS - LV SYSTOLIC VOL/BSA (12-30): 18.6 ML/M^2
BH CV ECHO MEAS - LVIDD: 4.5 CM
BH CV ECHO MEAS - LVIDS: 3.1 CM
BH CV ECHO MEAS - LVLD AP4: 7.9 CM
BH CV ECHO MEAS - LVLS AP4: 7.4 CM
BH CV ECHO MEAS - LVOT AREA (M): 2.8 CM^2
BH CV ECHO MEAS - LVOT AREA: 2.8 CM^2
BH CV ECHO MEAS - LVOT DIAM: 1.9 CM
BH CV ECHO MEAS - LVPWD: 0.87 CM
BH CV ECHO MEAS - LVPWS: 1.5 CM
BH CV ECHO MEAS - MV A MAX VEL: 134.4 CM/SEC
BH CV ECHO MEAS - MV E MAX VEL: 114.6 CM/SEC
BH CV ECHO MEAS - MV E/A: 0.85
BH CV ECHO MEAS - PA ACC SLOPE: 1142 CM/SEC^2
BH CV ECHO MEAS - PA ACC TIME: 0.09 SEC
BH CV ECHO MEAS - PA PR(ACCEL): 37.8 MMHG
BH CV ECHO MEAS - RAP SYSTOLE: 10 MMHG
BH CV ECHO MEAS - RVDD: 2.3 CM
BH CV ECHO MEAS - RVSP: 43.4 MMHG
BH CV ECHO MEAS - SI(CUBED): 39.1 ML/M^2
BH CV ECHO MEAS - SI(MOD-SP4): 26.7 ML/M^2
BH CV ECHO MEAS - SI(TEICH): 34.8 ML/M^2
BH CV ECHO MEAS - SV(CUBED): 63.1 ML
BH CV ECHO MEAS - SV(MOD-SP4): 43 ML
BH CV ECHO MEAS - SV(TEICH): 56.1 ML
BH CV ECHO MEAS - TR MAX VEL: 289.1 CM/SEC
BH CV NUCLEAR PRIOR STUDY: 3
BH CV STRESS BP STAGE 1: NORMAL
BH CV STRESS BP STAGE 2: NORMAL
BH CV STRESS COMMENTS STAGE 1: NORMAL
BH CV STRESS COMMENTS STAGE 2: NORMAL
BH CV STRESS DOSE REGADENOSON STAGE 1: 0.4
BH CV STRESS DURATION MIN STAGE 1: 0
BH CV STRESS DURATION MIN STAGE 2: 4
BH CV STRESS DURATION SEC STAGE 1: 15
BH CV STRESS DURATION SEC STAGE 2: 0
BH CV STRESS HR STAGE 1: 84
BH CV STRESS HR STAGE 2: 100
BH CV STRESS PROTOCOL 1: NORMAL
BH CV STRESS RECOVERY BP: NORMAL MMHG
BH CV STRESS RECOVERY HR: 80 BPM
BH CV STRESS STAGE 1: 1
BH CV STRESS STAGE 2: 2
LV EF NUC BP: 72 %
MAXIMAL PREDICTED HEART RATE: 156 BPM
PERCENT MAX PREDICTED HR: 88.46 %
STRESS BASELINE BP: NORMAL MMHG
STRESS BASELINE HR: 64 BPM
STRESS PERCENT HR: 104 %
STRESS POST PEAK BP: NORMAL MMHG
STRESS POST PEAK HR: 138 BPM
STRESS TARGET HR: 133 BPM

## 2017-02-10 PROCEDURE — 0 TECHNETIUM SESTAMIBI: Performed by: INTERNAL MEDICINE

## 2017-02-10 PROCEDURE — 78452 HT MUSCLE IMAGE SPECT MULT: CPT

## 2017-02-10 PROCEDURE — 78451 HT MUSCLE IMAGE SPECT SING: CPT

## 2017-02-10 PROCEDURE — 93306 TTE W/DOPPLER COMPLETE: CPT

## 2017-02-10 PROCEDURE — 25010000002 AMINOPHYLLINE PER 250 MG: Performed by: INTERNAL MEDICINE

## 2017-02-10 PROCEDURE — A9500 TC99M SESTAMIBI: HCPCS | Performed by: INTERNAL MEDICINE

## 2017-02-10 PROCEDURE — 93306 TTE W/DOPPLER COMPLETE: CPT | Performed by: INTERNAL MEDICINE

## 2017-02-10 PROCEDURE — 78451 HT MUSCLE IMAGE SPECT SING: CPT | Performed by: INTERNAL MEDICINE

## 2017-02-10 PROCEDURE — 93018 CV STRESS TEST I&R ONLY: CPT | Performed by: INTERNAL MEDICINE

## 2017-02-10 PROCEDURE — 93017 CV STRESS TEST TRACING ONLY: CPT

## 2017-02-10 PROCEDURE — 25010000002 REGADENOSON 0.4 MG/5ML SOLUTION: Performed by: INTERNAL MEDICINE

## 2017-02-10 RX ORDER — AMINOPHYLLINE DIHYDRATE 25 MG/ML
100 INJECTION, SOLUTION INTRAVENOUS ONCE
Status: COMPLETED | OUTPATIENT
Start: 2017-02-10 | End: 2017-02-10

## 2017-02-10 RX ADMIN — REGADENOSON 0.4 MG: 0.08 INJECTION, SOLUTION INTRAVENOUS at 10:25

## 2017-02-10 RX ADMIN — Medication 1 DOSE: at 10:25

## 2017-02-10 RX ADMIN — Medication 1 DOSE: at 08:55

## 2017-02-10 RX ADMIN — AMINOPHYLLINE 100 MG: 25 INJECTION, SOLUTION INTRAVENOUS at 10:29

## 2017-02-13 ENCOUNTER — TELEPHONE (OUTPATIENT)
Dept: CARDIOLOGY | Facility: CLINIC | Age: 67
End: 2017-02-13

## 2017-02-28 ENCOUNTER — OFFICE VISIT (OUTPATIENT)
Dept: CARDIOLOGY | Facility: CLINIC | Age: 67
End: 2017-02-28

## 2017-02-28 VITALS
DIASTOLIC BLOOD PRESSURE: 76 MMHG | BODY MASS INDEX: 27.84 KG/M2 | OXYGEN SATURATION: 99 % | WEIGHT: 141.8 LBS | SYSTOLIC BLOOD PRESSURE: 138 MMHG | HEART RATE: 60 BPM | HEIGHT: 60 IN

## 2017-02-28 DIAGNOSIS — I10 ESSENTIAL HYPERTENSION: Primary | ICD-10-CM

## 2017-02-28 DIAGNOSIS — E03.4 HYPOTHYROIDISM DUE TO ACQUIRED ATROPHY OF THYROID: ICD-10-CM

## 2017-02-28 DIAGNOSIS — I44.7 LBBB (LEFT BUNDLE BRANCH BLOCK): ICD-10-CM

## 2017-02-28 DIAGNOSIS — I38 VALVULAR HEART DISEASE: ICD-10-CM

## 2017-02-28 DIAGNOSIS — E78.2 MIXED HYPERLIPIDEMIA: ICD-10-CM

## 2017-02-28 PROCEDURE — 99213 OFFICE O/P EST LOW 20 MIN: CPT | Performed by: INTERNAL MEDICINE

## 2017-02-28 RX ORDER — PRAVASTATIN SODIUM 40 MG
40 TABLET ORAL DAILY
Qty: 90 TABLET | Refills: 2 | Status: SHIPPED | OUTPATIENT
Start: 2017-02-28 | End: 2017-05-16

## 2017-02-28 NOTE — PROGRESS NOTES
subjective     Chief Complaint   Patient presents with   • Fatigue   • Hyperlipidemia   • Hypertension     History of Present Illness    HYPERTENSION  Chasity Campos has long-standing history of essential hypertension.  she is taking medications regularly.  There are no medication side effects.  Blood pressure is very well controlled.  There has been no headache, nausea or chest pain.  There has been no syncopal or presyncopal episode.  she denies episodes of hypo-tension or accelerated hypertension.    Hyperlipidemia  Chasity Campos has long-standing history of hyperlipidemia.  Has been trying to lose weight and trying to follow diet and activity recommandations.  Patient is tolerating medications very well.  There has been no side effects.  Latest lipid levels have been fluctuating.     shortness of breath   Cardiac workup was done.  Stress test is normal echocardiogram shows mild aortic mitral and tricuspid regurgitation original inevitably insignificant.  Patient is feeling better    Patient Active Problem List   Diagnosis   • Essential hypertension   • Hypothyroidism   • Gastroesophageal reflux disease without esophagitis   • Hyperlipidemia   • LBBB (left bundle branch block)   • Valvular heart disease trace AI, Mod MR   • Hypokalemia       Social History   Substance Use Topics   • Smoking status: Never Smoker   • Smokeless tobacco: Never Used   • Alcohol use No       Allergies   Allergen Reactions   • Eggs Or Egg-Derived Products    • Iron    • Losartan Hives, Itching and Swelling         Current Outpatient Prescriptions:   •  cetirizine (ZyrTEC) 10 MG tablet, Take 10 mg by mouth daily., Disp: , Rfl:   •  esomeprazole (NEXIUM) 20 MG capsule, Take 1 capsule by mouth Every Morning Before Breakfast., Disp: 30 capsule, Rfl: 0  •  famotidine (PEPCID) 20 MG tablet, Take 1 tablet by mouth Every Night., Disp: 30 tablet, Rfl: 0  •  fluticasone-salmeterol (ADVAIR) 250-50 MCG/DOSE DISKUS, Inhale 2 (two) times a day.,  "Disp: , Rfl:   •  levothyroxine (SYNTHROID, LEVOTHROID) 25 MCG tablet, Take 1 tablet by mouth Daily., Disp: 90 tablet, Rfl: 0  •  metoprolol tartrate (LOPRESSOR) 25 MG tablet, Take 25 mg by mouth daily., Disp: , Rfl:   •  omeprazole (PriLOSEC) 20 MG capsule, Take 20 mg by mouth daily., Disp: , Rfl:   •  potassium chloride (K-DUR) 10 MEQ CR tablet, Take 1 tablet by mouth Daily., Disp: 30 tablet, Rfl: 0  •  pravastatin (PRAVACHOL) 20 MG tablet, Take 1 tablet by mouth Daily., Disp: 90 tablet, Rfl: 0      The following portions of the patient's history were reviewed and updated as appropriate: allergies, current medications, past family history, past medical history, past social history, past surgical history and problem list.    Review of Systems   Constitution: Negative.   HENT: Negative.    Eyes: Negative.    Cardiovascular: Negative.    Respiratory: Negative.    Hematologic/Lymphatic: Negative.    Musculoskeletal: Negative.    Gastrointestinal: Negative.    Neurological: Negative.           Objective:     Visit Vitals   • /76 (BP Location: Left arm, Patient Position: Sitting)   • Pulse 60   • Ht 60\" (152.4 cm)   • Wt 141 lb 12.8 oz (64.3 kg)   • SpO2 99%   • BMI 27.69 kg/m2     Physical Exam   Constitutional: She appears well-developed and well-nourished.   HENT:   Head: Normocephalic and atraumatic.   Mouth/Throat: Oropharynx is clear and moist.   Eyes: Conjunctivae and EOM are normal. Pupils are equal, round, and reactive to light. No scleral icterus.   Neck: Normal range of motion. Neck supple. No JVD present. No tracheal deviation present. No thyromegaly present.   Cardiovascular: Normal rate, regular rhythm, normal heart sounds and intact distal pulses.  Exam reveals no friction rub.    No murmur heard.  Pulmonary/Chest: Effort normal and breath sounds normal. No respiratory distress. She has no wheezes. She has no rales. She exhibits no tenderness.   Abdominal: Soft. Bowel sounds are normal. She exhibits " no distension and no mass. There is no tenderness. There is no rebound and no guarding.   Musculoskeletal: Normal range of motion. She exhibits no edema, tenderness or deformity.   Lymphadenopathy:     She has no cervical adenopathy.   Neurological: She is alert. She has normal reflexes. No cranial nerve deficit. She exhibits normal muscle tone. Coordination normal.   Skin: Skin is warm and dry.   Psychiatric: She has a normal mood and affect. Her behavior is normal. Judgment and thought content normal.         Lab Review  Cholesterol 211 .  CMP normal    Procedures     Narrative:  Echocardiogram.  10 2017    · Left ventricular wall thickness is consistent with mild-to-moderate   concentric hypertrophy.  · All left ventricular wall segments contract normally.  · Left ventricular function is normal.  · Left ventricular diastolic dysfunction (grade I) consistent with   impaired relaxation.  · Left atrial cavity size is borderline dilated.  · Mild mitral valve regurgitation is present  · Trace to mild tricuspid valve regurgitation is present. Estimated right   ventricular systolic pressure from tricuspid regurgitation is mildly   elevated (35-45 mmHg).  · There is no evidence of pericardial effusion  · Trace aortic valve regurgitation is present     Narrative:  Stress test February 10, 2017    · Left ventricular ejection fraction is hyperdynamic (Calculated EF >   70%).  · Myocardial perfusion imaging indicates a normal myocardial perfusion   study with no evidence of ischemia.  · Impressions are consistent with a low risk study.  · Findings consistent with a normal ECG stress test.          I personally viewed and interpreted the patient's LAB data         Assessment:     1. Essential hypertension    2. Mixed hyperlipidemia    3. LBBB (left bundle branch block)    4. Valvular heart disease trace AI, Mod MR    5. Hypothyroidism due to acquired atrophy of thyroid          Plan:      Blood pressure is very well  controlled patient will continue current medications    Lipids significantly abnormal  Pravachol dose was increased to 40 mg daily    Patient has very mild aortic insufficiency and mild mitral and tricuspid regurgitation.  Patient is asymptomatic no change in therapy needed.    Stress test is normal  Pravachol dose was increased otherwise she will continue current medications.  Aggressive risk factor modification emphasized      No Follow-up on file.

## 2017-04-28 RX ORDER — LEVOTHYROXINE SODIUM 0.03 MG/1
25 TABLET ORAL DAILY
Qty: 90 TABLET | Refills: 0 | Status: SHIPPED | OUTPATIENT
Start: 2017-04-28 | End: 2017-08-08 | Stop reason: SDUPTHER

## 2017-05-16 ENCOUNTER — OFFICE VISIT (OUTPATIENT)
Dept: CARDIOLOGY | Facility: CLINIC | Age: 67
End: 2017-05-16

## 2017-05-16 VITALS
OXYGEN SATURATION: 97 % | SYSTOLIC BLOOD PRESSURE: 180 MMHG | BODY MASS INDEX: 27.88 KG/M2 | DIASTOLIC BLOOD PRESSURE: 90 MMHG | HEIGHT: 60 IN | HEART RATE: 66 BPM | WEIGHT: 142 LBS

## 2017-05-16 DIAGNOSIS — I10 ESSENTIAL HYPERTENSION: Primary | ICD-10-CM

## 2017-05-16 DIAGNOSIS — E78.2 MIXED HYPERLIPIDEMIA: ICD-10-CM

## 2017-05-16 DIAGNOSIS — I38 VALVULAR HEART DISEASE: ICD-10-CM

## 2017-05-16 DIAGNOSIS — I44.7 LBBB (LEFT BUNDLE BRANCH BLOCK): ICD-10-CM

## 2017-05-16 PROCEDURE — 99214 OFFICE O/P EST MOD 30 MIN: CPT | Performed by: INTERNAL MEDICINE

## 2017-05-16 RX ORDER — AMLODIPINE BESYLATE 5 MG/1
5 TABLET ORAL DAILY
Qty: 90 TABLET | Refills: 3 | Status: SHIPPED | OUTPATIENT
Start: 2017-05-16 | End: 2017-08-08

## 2017-05-16 RX ORDER — SPIRONOLACTONE 25 MG/1
25 TABLET ORAL DAILY
Qty: 30 TABLET | Refills: 11 | Status: SHIPPED | OUTPATIENT
Start: 2017-05-16 | End: 2017-08-28

## 2017-05-16 RX ORDER — ROSUVASTATIN CALCIUM 10 MG/1
10 TABLET, COATED ORAL DAILY
Qty: 90 TABLET | Refills: 3 | Status: SHIPPED | OUTPATIENT
Start: 2017-05-16 | End: 2017-08-08

## 2017-07-28 ENCOUNTER — LAB (OUTPATIENT)
Dept: LAB | Facility: HOSPITAL | Age: 67
End: 2017-07-28

## 2017-07-28 DIAGNOSIS — E78.2 MIXED HYPERLIPIDEMIA: ICD-10-CM

## 2017-07-28 LAB
ALBUMIN SERPL-MCNC: 4.4 G/DL (ref 3.4–4.8)
ALBUMIN/GLOB SERPL: 1.5 G/DL (ref 1.5–2.5)
ALP SERPL-CCNC: 114 U/L (ref 35–104)
ALT SERPL W P-5'-P-CCNC: 14 U/L (ref 10–36)
ANION GAP SERPL CALCULATED.3IONS-SCNC: 4.3 MMOL/L (ref 3.6–11.2)
ANISOCYTOSIS BLD QL: NORMAL
AST SERPL-CCNC: 19 U/L (ref 10–30)
BASOPHILS # BLD AUTO: 0.04 10*3/MM3 (ref 0–0.3)
BASOPHILS NFR BLD AUTO: 0.6 % (ref 0–2)
BILIRUB SERPL-MCNC: 0.4 MG/DL (ref 0.2–1.8)
BUN BLD-MCNC: 12 MG/DL (ref 7–21)
BUN/CREAT SERPL: 15.6 (ref 7–25)
CALCIUM SPEC-SCNC: 9.1 MG/DL (ref 7.7–10)
CHLORIDE SERPL-SCNC: 111 MMOL/L (ref 99–112)
CHOLEST SERPL-MCNC: 214 MG/DL (ref 0–200)
CK SERPL-CCNC: 65 U/L (ref 24–173)
CO2 SERPL-SCNC: 27.7 MMOL/L (ref 24.3–31.9)
CREAT BLD-MCNC: 0.77 MG/DL (ref 0.43–1.29)
DEPRECATED RDW RBC AUTO: 51.1 FL (ref 37–54)
EOSINOPHIL # BLD AUTO: 0.11 10*3/MM3 (ref 0–0.7)
EOSINOPHIL NFR BLD AUTO: 1.7 % (ref 0–7)
ERYTHROCYTE [DISTWIDTH] IN BLOOD BY AUTOMATED COUNT: 16.2 % (ref 11.5–14.5)
GFR SERPL CREATININE-BSD FRML MDRD: 75 ML/MIN/1.73
GLOBULIN UR ELPH-MCNC: 2.9 GM/DL
GLUCOSE BLD-MCNC: 91 MG/DL (ref 70–110)
HCT VFR BLD AUTO: 38.6 % (ref 37–47)
HDLC SERPL-MCNC: 80 MG/DL (ref 60–100)
HGB BLD-MCNC: 12.2 G/DL (ref 12–16)
HYPOCHROMIA BLD QL: NORMAL
IMM GRANULOCYTES # BLD: 0.03 10*3/MM3 (ref 0–0.03)
IMM GRANULOCYTES NFR BLD: 0.5 % (ref 0–0.5)
LARGE PLATELETS: NORMAL
LDLC SERPL CALC-MCNC: 117 MG/DL (ref 0–100)
LDLC/HDLC SERPL: 1.47 {RATIO}
LYMPHOCYTES # BLD AUTO: 2.19 10*3/MM3 (ref 1–3)
LYMPHOCYTES NFR BLD AUTO: 34.1 % (ref 16–46)
MCH RBC QN AUTO: 26.9 PG (ref 27–33)
MCHC RBC AUTO-ENTMCNC: 31.6 G/DL (ref 33–37)
MCV RBC AUTO: 85.2 FL (ref 80–94)
MONOCYTES # BLD AUTO: 0.51 10*3/MM3 (ref 0.1–0.9)
MONOCYTES NFR BLD AUTO: 7.9 % (ref 0–12)
NEUTROPHILS # BLD AUTO: 3.55 10*3/MM3 (ref 1.4–6.5)
NEUTROPHILS NFR BLD AUTO: 55.2 % (ref 40–75)
NRBC BLD MANUAL-RTO: 0 /100 WBC (ref 0–0)
OSMOLALITY SERPL CALC.SUM OF ELEC: 284.3 MOSM/KG (ref 273–305)
PLATELET # BLD AUTO: 150 10*3/MM3 (ref 130–400)
PMV BLD AUTO: ABNORMAL FL (ref 6–10)
POTASSIUM BLD-SCNC: 4.5 MMOL/L (ref 3.5–5.3)
PROT SERPL-MCNC: 7.3 G/DL (ref 6–8)
RBC # BLD AUTO: 4.53 10*6/MM3 (ref 4.2–5.4)
SODIUM BLD-SCNC: 143 MMOL/L (ref 135–153)
TRIGL SERPL-MCNC: 83 MG/DL (ref 0–150)
VLDLC SERPL-MCNC: 16.6 MG/DL
WBC NRBC COR # BLD: 6.43 10*3/MM3 (ref 4.5–12.5)

## 2017-07-28 PROCEDURE — 85025 COMPLETE CBC W/AUTO DIFF WBC: CPT | Performed by: INTERNAL MEDICINE

## 2017-07-28 PROCEDURE — 80061 LIPID PANEL: CPT | Performed by: INTERNAL MEDICINE

## 2017-07-28 PROCEDURE — 80053 COMPREHEN METABOLIC PANEL: CPT | Performed by: INTERNAL MEDICINE

## 2017-07-28 PROCEDURE — 85007 BL SMEAR W/DIFF WBC COUNT: CPT | Performed by: INTERNAL MEDICINE

## 2017-07-28 PROCEDURE — 82550 ASSAY OF CK (CPK): CPT | Performed by: INTERNAL MEDICINE

## 2017-08-08 ENCOUNTER — OFFICE VISIT (OUTPATIENT)
Dept: CARDIOLOGY | Facility: CLINIC | Age: 67
End: 2017-08-08

## 2017-08-08 VITALS
WEIGHT: 140.4 LBS | HEIGHT: 60 IN | OXYGEN SATURATION: 98 % | HEART RATE: 87 BPM | SYSTOLIC BLOOD PRESSURE: 220 MMHG | DIASTOLIC BLOOD PRESSURE: 112 MMHG | BODY MASS INDEX: 27.56 KG/M2

## 2017-08-08 DIAGNOSIS — I38 VALVULAR HEART DISEASE: ICD-10-CM

## 2017-08-08 DIAGNOSIS — I10 ESSENTIAL HYPERTENSION: Primary | ICD-10-CM

## 2017-08-08 DIAGNOSIS — E03.4 HYPOTHYROIDISM DUE TO ACQUIRED ATROPHY OF THYROID: ICD-10-CM

## 2017-08-08 DIAGNOSIS — I44.7 LBBB (LEFT BUNDLE BRANCH BLOCK): ICD-10-CM

## 2017-08-08 DIAGNOSIS — E78.2 MIXED HYPERLIPIDEMIA: ICD-10-CM

## 2017-08-08 PROCEDURE — 99214 OFFICE O/P EST MOD 30 MIN: CPT | Performed by: INTERNAL MEDICINE

## 2017-08-08 RX ORDER — HYDRALAZINE HYDROCHLORIDE 10 MG/1
10 TABLET, FILM COATED ORAL 3 TIMES DAILY
Qty: 60 TABLET | Refills: 0 | Status: SHIPPED | OUTPATIENT
Start: 2017-08-08 | End: 2017-08-28

## 2017-08-08 RX ORDER — HYDROCHLOROTHIAZIDE 25 MG/1
25 TABLET ORAL DAILY
Qty: 90 TABLET | Refills: 3 | Status: SHIPPED | OUTPATIENT
Start: 2017-08-08 | End: 2017-08-28

## 2017-08-08 RX ORDER — LEVOTHYROXINE SODIUM 0.03 MG/1
25 TABLET ORAL DAILY
Qty: 90 TABLET | Refills: 0 | Status: SHIPPED | OUTPATIENT
Start: 2017-08-08 | End: 2017-09-12 | Stop reason: SDUPTHER

## 2017-08-08 NOTE — PROGRESS NOTES
subjective     Chief Complaint   Patient presents with   • Follow-up   • Hypertension   • Hyperlipidemia   • Fatigue     History of Present Illness  Patient is 67 years old white female who is here because she cannot tolerate current blood pressure medications.  Patient is intolerant to a lot of medications.  Her blood pressure today is around 200/100.  She states that she cannot take the any   Ace inhibitors which caused tongue swelling.  Similarly she says that the   Benicar and losartan caused a lot of problem and she is not willing to try those again.  Hydrochlorothiazide caused severe hypokalemia and she is afraid of that.  Calcium channel blockers caused generalized edema.  She was given very low-dose but she could not tolerate that.  Clonidine causes problem with a very dry mouth and sedation.  She also has tried labetalol.    Currently she does not seem to be taking anything except metoprolol 25 mg 1 a day and Aldactone 25 mg daily.  Patient is having lot headaches but according to her this is nothing new.  She says that she has been having headaches for last 32 years she just has to lay down.    Hyperlipidemia  Chasity Campos has long-standing history of hyperlipidemia. Has been trying to lose weight and trying to follow diet and activity recommandations. Patient is tolerating medications very well. There has been no side effects.  Lipids uncontrolled with current statin therapy.  We will switch her to Crestor.  Patient is not taking Crestor because it is expensive.      shortness of breath   Cardiac workup was done. Stress test is normal echocardiogram shows mild aortic mitral and tricuspid regurgitation original inevitably insignificant. Patient is feeling better.     Hypothyroidism.  Patient is taking Synthroid 25 µg daily.  Patient also says that she has recurrent pancreatitis that causes her to have hypokalemia but she has not been having any vomiting lately she has been eating a lot of Gatorade and  her potassium is normal.        Past Surgical History:   Procedure Laterality Date   • CARDIAC CATHETERIZATION     • CARDIAC CATHETERIZATION  2015   • CARDIOVASCULAR STRESS TEST  2015   •  SECTION      x 3    • ECHO - CONVERTED  2015   • THYROID SURGERY      age 16+ 17, for cysts     Family History   Problem Relation Age of Onset   • Lung cancer Mother    • Stroke Father      Past Medical History:   Diagnosis Date   • Aortic insufficiency    • Asthma    • Disease of thyroid gland    • Essential hypertension    • SOB (shortness of breath)      Patient Active Problem List   Diagnosis   • Essential hypertension   • Hypothyroidism   • Gastroesophageal reflux disease without esophagitis   • Hyperlipidemia   • LBBB (left bundle branch block)   • Valvular heart disease trace AI, Mod MR   • Hypokalemia       Social History   Substance Use Topics   • Smoking status: Never Smoker   • Smokeless tobacco: Never Used   • Alcohol use No       Allergies   Allergen Reactions   • Eggs Or Egg-Derived Products    • Iron    • Losartan Hives, Itching and Swelling       Current Outpatient Prescriptions on File Prior to Visit   Medication Sig   • cetirizine (ZyrTEC) 10 MG tablet Take 10 mg by mouth daily.   • fluticasone-salmeterol (ADVAIR) 250-50 MCG/DOSE DISKUS Inhale 2 (two) times a day.   • levothyroxine (SYNTHROID, LEVOTHROID) 25 MCG tablet Take 1 tablet by mouth Daily.   • omeprazole (PriLOSEC) 20 MG capsule Take 20 mg by mouth daily.   • spironolactone (ALDACTONE) 25 MG tablet Take 1 tablet by mouth Daily.   • [DISCONTINUED] metoprolol tartrate (LOPRESSOR) 25 MG tablet Take 25 mg by mouth daily.   • rosuvastatin (CRESTOR) 10 MG tablet Take 1 tablet by mouth Daily.   • [DISCONTINUED] amLODIPine (NORVASC) 5 MG tablet Take 1 tablet by mouth Daily.     No current facility-administered medications on file prior to visit.          The following portions of the patient's history were reviewed and updated as appropriate:  "allergies, current medications, past family history, past medical history, past social history, past surgical history and problem list.    Review of Systems   Constitution: Negative.   HENT: Positive for headaches. Negative for congestion.    Eyes: Negative.    Cardiovascular: Negative.  Negative for chest pain, cyanosis, dyspnea on exertion, irregular heartbeat, leg swelling, near-syncope, orthopnea, palpitations, paroxysmal nocturnal dyspnea and syncope.   Respiratory: Negative.  Negative for shortness of breath.    Endocrine: Negative.    Hematologic/Lymphatic: Negative.    Skin: Negative.    Musculoskeletal: Negative.    Gastrointestinal: Negative.    Genitourinary: Negative.    Neurological: Negative for loss of balance, numbness, paresthesias, tremors and vertigo.   Psychiatric/Behavioral: The patient is nervous/anxious.           Objective:     BP (!) 220/112 (BP Location: Left arm, Patient Position: Sitting) Comment: 196/100  RECHECK 20 MIN LATER  Pulse 87  Ht 60\" (152.4 cm)  Wt 140 lb 6.4 oz (63.7 kg)  SpO2 98%  BMI 27.42 kg/m2  Physical Exam   Constitutional: She appears well-developed and well-nourished. No distress.   HENT:   Head: Normocephalic and atraumatic.   Mouth/Throat: Oropharynx is clear and moist. No oropharyngeal exudate.   Eyes: Conjunctivae and EOM are normal. Pupils are equal, round, and reactive to light. No scleral icterus.   Neck: Normal range of motion. Neck supple. No JVD present. No tracheal deviation present. No thyromegaly present.   Cardiovascular: Normal rate, regular rhythm, normal heart sounds and intact distal pulses.  PMI is not displaced.  Exam reveals no gallop, no friction rub and no decreased pulses.    No murmur heard.  Pulses:       Carotid pulses are 3+ on the right side, and 3+ on the left side.       Radial pulses are 3+ on the right side, and 3+ on the left side.   Pulmonary/Chest: Effort normal and breath sounds normal. No respiratory distress. She has no " wheezes. She has no rales. She exhibits no tenderness.   Abdominal: Soft. Bowel sounds are normal. She exhibits no distension, no abdominal bruit and no mass. There is no splenomegaly or hepatomegaly. There is no tenderness. There is no rebound and no guarding.   Musculoskeletal: Normal range of motion. She exhibits no edema, tenderness or deformity.   Lymphadenopathy:     She has no cervical adenopathy.   Neurological: She is alert. She has normal reflexes. No cranial nerve deficit. She exhibits normal muscle tone. Coordination normal.   Skin: Skin is warm and dry. No rash noted. She is not diaphoretic. No erythema.   Psychiatric: She has a normal mood and affect. Her behavior is normal. Judgment and thought content normal.         Lab Review  Lab Results   Component Value Date     07/28/2017    K 4.5 07/28/2017     07/28/2017    BUN 12 07/28/2017    CREATININE 0.77 07/28/2017    GLUCOSE 91 07/28/2017    CALCIUM 9.1 07/28/2017    ALT 14 07/28/2017    ALKPHOS 114 (H) 07/28/2017    LABIL2 1.5 07/28/2017     Lab Results   Component Value Date    CKTOTAL 65 07/28/2017     Lab Results   Component Value Date    WBC 6.43 07/28/2017    HGB 12.2 07/28/2017    HCT 38.6 07/28/2017     07/28/2017     Lab Results   Component Value Date    INR 0.89 01/17/2017    INR 0.96 07/23/2015     Lab Results   Component Value Date    MG 2.0 07/23/2015     Lab Results   Component Value Date    TSH 1.266 01/20/2017     Lab Results   Component Value Date    BNP 25.7 01/17/2017     Lab Results   Component Value Date    CHOL 214 (H) 07/28/2017    CHLPL 192 05/19/2015    TRIG 83 07/28/2017    HDL 80 07/28/2017    LDLCALC 117 (H) 07/28/2017    VLDL 16.6 07/28/2017    LDLHDL 1.47 07/28/2017         Procedures       I personally viewed and interpreted the patient's LAB data         Assessment:     1. Essential hypertension    2. Mixed hyperlipidemia    3. LBBB (left bundle branch block)    4. Valvular heart disease trace AI, Mod  MR    5. Hypothyroidism due to acquired atrophy of thyroid          Plan:   Lab reviewed and discussed with the patient.  Patient is worried about hypokalemia her potassium is normal.  Blood pressure is uncontrolled.  Patient is afraid of all blood pressure medications is very difficult to convince her to take anything.  Currently she is taking Aldactone 25 mg daily although she is afraid of hypokalemia her potassium is actually 4.5  she was advised to continue Aldactone 25 daily and add hydrochlorothiazide 12.5 daily oh for a potassium will remain normal    Lopressor 25 mg was increased to 25 twice a day    Patient was started on low-dose hydralazine 10 mg 3 times a day.  We will gradually increase the dose to tolerance.  Patient will call frequently about his blood pressure readings.  Patient will be physically seen by me in 4 weeks.    Patient was advised to go back on Pravachol  Unfortunately patient is not willing to take any ACE inhibitor or ARB.  She also does not like clonidine or Bystolic.  Hypertension low-dose probably could be initiated but will reserve it now after nothing else works.  Healthy lifestyle and salt restriction discussed.  Follow-up scheduled             No Follow-up on file.

## 2017-08-28 ENCOUNTER — OFFICE VISIT (OUTPATIENT)
Dept: CARDIOLOGY | Facility: CLINIC | Age: 67
End: 2017-08-28

## 2017-08-28 VITALS
WEIGHT: 141 LBS | BODY MASS INDEX: 27.54 KG/M2 | HEART RATE: 65 BPM | DIASTOLIC BLOOD PRESSURE: 90 MMHG | OXYGEN SATURATION: 99 % | SYSTOLIC BLOOD PRESSURE: 140 MMHG

## 2017-08-28 DIAGNOSIS — E03.4 HYPOTHYROIDISM DUE TO ACQUIRED ATROPHY OF THYROID: ICD-10-CM

## 2017-08-28 DIAGNOSIS — E78.2 MIXED HYPERLIPIDEMIA: ICD-10-CM

## 2017-08-28 DIAGNOSIS — I10 ESSENTIAL HYPERTENSION: Primary | ICD-10-CM

## 2017-08-28 PROCEDURE — 99214 OFFICE O/P EST MOD 30 MIN: CPT | Performed by: INTERNAL MEDICINE

## 2017-08-28 RX ORDER — HYDROCHLOROTHIAZIDE 25 MG/1
12.5 TABLET ORAL DAILY
Qty: 90 TABLET | Refills: 3 | Status: SHIPPED | OUTPATIENT
Start: 2017-08-28 | End: 2018-07-11 | Stop reason: SDUPTHER

## 2017-08-28 RX ORDER — POTASSIUM CHLORIDE 750 MG/1
10 TABLET, FILM COATED, EXTENDED RELEASE ORAL DAILY
Qty: 30 TABLET | Refills: 11 | Status: SHIPPED | OUTPATIENT
Start: 2017-08-28 | End: 2018-07-11 | Stop reason: SDUPTHER

## 2017-08-28 RX ORDER — TERAZOSIN 1 MG/1
1 CAPSULE ORAL NIGHTLY
Qty: 30 CAPSULE | Refills: 0 | Status: SHIPPED | OUTPATIENT
Start: 2017-08-28 | End: 2017-09-27 | Stop reason: SDUPTHER

## 2017-08-28 NOTE — PROGRESS NOTES
subjective     Chief Complaint   Patient presents with   • Hypertension   • Hypotension     History of Present Illness  Patient states that she cannot tolerate blood pressure medications blood pressure has been running high she gets headaches but she is here because she has stopped all her medications.    She has tried Ace inhibitors, ARB and claims to be allergic to it.  She was given amlodipine but had quite a bit of ankle edema with amlodipine 5.  Patient was then started on hydralazine 10 mg which is a low dose but she did not take it more than few pills and stated that she cannot take it gives a strange feelings    Patient also stopped HCTZ.  Patient tried Aldactone and did not like it either    Patient is here now with blood pressure 170/94 and wants something done but is reluctant to take any medications.  Patient thinks that her blood pressure drops too low with medications and is very erratic    Other problems consist of hyperlipidemia, hypothyroidism mild valvular heart disease and gastroesophageal reflux disorder.    Past Surgical History:   Procedure Laterality Date   • CARDIAC CATHETERIZATION     • CARDIAC CATHETERIZATION  2015   • CARDIOVASCULAR STRESS TEST  2015   •  SECTION      x 3    • ECHO - CONVERTED  2015   • THYROID SURGERY      age 16+ 17, for cysts     Family History   Problem Relation Age of Onset   • Lung cancer Mother    • Stroke Father      Past Medical History:   Diagnosis Date   • Aortic insufficiency    • Asthma    • Disease of thyroid gland    • Essential hypertension    • SOB (shortness of breath)      Patient Active Problem List   Diagnosis   • Essential hypertension   • Hypothyroidism   • Gastroesophageal reflux disease without esophagitis   • Hyperlipidemia   • LBBB (left bundle branch block)   • Valvular heart disease trace AI, Mod MR   • Hypokalemia       Social History   Substance Use Topics   • Smoking status: Never Smoker   • Smokeless tobacco: Never Used    • Alcohol use No       Allergies   Allergen Reactions   • Eggs Or Egg-Derived Products    • Iron    • Losartan Hives, Itching and Swelling       Current Outpatient Prescriptions on File Prior to Visit   Medication Sig   • cetirizine (ZyrTEC) 10 MG tablet Take 10 mg by mouth daily.   • fluticasone-salmeterol (ADVAIR) 250-50 MCG/DOSE DISKUS Inhale 2 (two) times a day.   • levothyroxine (SYNTHROID, LEVOTHROID) 25 MCG tablet Take 1 tablet by mouth Daily.   • omeprazole (PriLOSEC) 20 MG capsule Take 20 mg by mouth daily.   • [DISCONTINUED] hydrochlorothiazide (HYDRODIURIL) 25 MG tablet Take 1 tablet by mouth Daily.   • [DISCONTINUED] metoprolol tartrate (LOPRESSOR) 25 MG tablet Take 1 tablet by mouth 2 (Two) Times a Day.   • [DISCONTINUED] hydrALAZINE (APRESOLINE) 10 MG tablet Take 1 tablet by mouth 3 (Three) Times a Day.   • [DISCONTINUED] spironolactone (ALDACTONE) 25 MG tablet Take 1 tablet by mouth Daily.     No current facility-administered medications on file prior to visit.          The following portions of the patient's history were reviewed and updated as appropriate: allergies, current medications, past family history, past medical history, past social history, past surgical history and problem list.    Review of Systems   Constitution: Negative.   HENT: Negative.  Negative for congestion and headaches.    Eyes: Negative.    Cardiovascular: Negative.  Negative for chest pain, cyanosis, dyspnea on exertion, irregular heartbeat, leg swelling, near-syncope, orthopnea, palpitations, paroxysmal nocturnal dyspnea and syncope.   Respiratory: Negative.  Negative for shortness of breath.    Hematologic/Lymphatic: Negative.    Musculoskeletal: Negative.    Gastrointestinal: Negative.    Neurological: Negative.           Objective:     /90 (BP Location: Left arm, Patient Position: Sitting, Cuff Size: Adult)  Pulse 65  Wt 141 lb (64 kg)  SpO2 99%  BMI 27.54 kg/m2  Physical Exam   Constitutional: She appears  well-developed and well-nourished. No distress.   HENT:   Head: Normocephalic and atraumatic.   Mouth/Throat: Oropharynx is clear and moist. No oropharyngeal exudate.   Eyes: Conjunctivae and EOM are normal. Pupils are equal, round, and reactive to light. No scleral icterus.   Neck: Normal range of motion. Neck supple. No JVD present. No tracheal deviation present. No thyromegaly present.   Cardiovascular: Normal rate, regular rhythm, normal heart sounds and intact distal pulses.  PMI is not displaced.  Exam reveals no gallop, no friction rub and no decreased pulses.    No murmur heard.  Pulses:       Carotid pulses are 3+ on the right side, and 3+ on the left side.       Radial pulses are 3+ on the right side, and 3+ on the left side.   Pulmonary/Chest: Effort normal and breath sounds normal. No respiratory distress. She has no wheezes. She has no rales. She exhibits no tenderness.   Abdominal: Soft. Bowel sounds are normal. She exhibits no distension, no abdominal bruit and no mass. There is no splenomegaly or hepatomegaly. There is no tenderness. There is no rebound and no guarding.   Musculoskeletal: Normal range of motion. She exhibits no edema, tenderness or deformity.   Lymphadenopathy:     She has no cervical adenopathy.   Neurological: She is alert. She has normal reflexes. No cranial nerve deficit. She exhibits normal muscle tone. Coordination normal.   Skin: Skin is warm and dry. No rash noted. She is not diaphoretic. No erythema.   Psychiatric: She has a normal mood and affect. Her behavior is normal. Judgment and thought content normal.         Lab Review  Lab Results   Component Value Date     07/28/2017    K 4.5 07/28/2017     07/28/2017    BUN 12 07/28/2017    CREATININE 0.77 07/28/2017    GLUCOSE 91 07/28/2017    CALCIUM 9.1 07/28/2017    ALT 14 07/28/2017    ALKPHOS 114 (H) 07/28/2017    LABIL2 1.5 07/28/2017     Lab Results   Component Value Date    CKTOTAL 65 07/28/2017     Lab  Results   Component Value Date    WBC 6.43 07/28/2017    HGB 12.2 07/28/2017    HCT 38.6 07/28/2017     07/28/2017     Lab Results   Component Value Date    INR 0.89 01/17/2017    INR 0.96 07/23/2015     Lab Results   Component Value Date    MG 2.0 07/23/2015     Lab Results   Component Value Date    TSH 1.266 01/20/2017     Lab Results   Component Value Date    BNP 25.7 01/17/2017     Lab Results   Component Value Date    CHOL 214 (H) 07/28/2017    CHLPL 192 05/19/2015    TRIG 83 07/28/2017    HDL 80 07/28/2017    LDLCALC 117 (H) 07/28/2017    VLDL 16.6 07/28/2017    LDLHDL 1.47 07/28/2017         Procedures       I personally viewed and interpreted the patient's LAB data         Assessment:     1. Essential hypertension    2. Mixed hyperlipidemia    3. Hypothyroidism due to acquired atrophy of thyroid          Plan:      Patient was advised to take hydrochlorothiazide 12.5 daily  K-Dur 10 daily  Hytrin 1 mg at bedtime was started  First dose syncope was discussed.  Follow-up in 2 weeks  Lipid medications were not started at this time.  After blood pressure control will start on lipid control  At this time she is having too many reactions to medications and is not willing to try any new medications follow-up scheduled      Return in about 2 weeks (around 9/11/2017).

## 2017-09-12 ENCOUNTER — OFFICE VISIT (OUTPATIENT)
Dept: CARDIOLOGY | Facility: CLINIC | Age: 67
End: 2017-09-12

## 2017-09-12 VITALS
DIASTOLIC BLOOD PRESSURE: 94 MMHG | HEART RATE: 62 BPM | SYSTOLIC BLOOD PRESSURE: 160 MMHG | HEIGHT: 60 IN | OXYGEN SATURATION: 99 % | WEIGHT: 139.8 LBS | BODY MASS INDEX: 27.45 KG/M2

## 2017-09-12 DIAGNOSIS — I10 ESSENTIAL HYPERTENSION: Primary | ICD-10-CM

## 2017-09-12 DIAGNOSIS — I38 VALVULAR HEART DISEASE: ICD-10-CM

## 2017-09-12 DIAGNOSIS — E78.2 MIXED HYPERLIPIDEMIA: ICD-10-CM

## 2017-09-12 DIAGNOSIS — I44.7 LBBB (LEFT BUNDLE BRANCH BLOCK): ICD-10-CM

## 2017-09-12 DIAGNOSIS — E03.4 HYPOTHYROIDISM DUE TO ACQUIRED ATROPHY OF THYROID: ICD-10-CM

## 2017-09-12 PROCEDURE — 99214 OFFICE O/P EST MOD 30 MIN: CPT | Performed by: INTERNAL MEDICINE

## 2017-09-12 RX ORDER — LEVOTHYROXINE SODIUM 0.03 MG/1
25 TABLET ORAL DAILY
Qty: 90 TABLET | Refills: 0 | Status: SHIPPED | OUTPATIENT
Start: 2017-09-12 | End: 2017-11-22 | Stop reason: SDUPTHER

## 2017-09-12 RX ORDER — ROSUVASTATIN CALCIUM 5 MG/1
2.5 TABLET, COATED ORAL DAILY
Qty: 45 TABLET | Refills: 1 | Status: SHIPPED | OUTPATIENT
Start: 2017-09-12 | End: 2017-09-29 | Stop reason: SDUPTHER

## 2017-09-12 RX ORDER — NEBIVOLOL 2.5 MG/1
2.5 TABLET ORAL DAILY
Qty: 90 TABLET | Refills: 1 | Status: SHIPPED | OUTPATIENT
Start: 2017-09-12 | End: 2017-09-29 | Stop reason: SDUPTHER

## 2017-09-12 NOTE — PROGRESS NOTES
subjective     Chief Complaint   Patient presents with   • Follow-up   • Hypertension   • Hyperlipidemia     History of Present Illness  Patient is here for follow-up.  Blood pressure is significantly better patient is not having any headaches she is tolerating medications very well however blood pressure is still not normal.    Patient also has hyperlipidemia she has not been taking any medications  She is willing to try Crestor which will be started today.    Her other problems consist of hypothyroidism gastroesophageal reflux disorder   Besides high blood pressure she seems to be doing very well      Past Surgical History:   Procedure Laterality Date   • CARDIAC CATHETERIZATION     • CARDIAC CATHETERIZATION  2015   • CARDIOVASCULAR STRESS TEST  2015   •  SECTION      x 3    • ECHO - CONVERTED  2015   • THYROID SURGERY      age 16+ 17, for cysts     Family History   Problem Relation Age of Onset   • Lung cancer Mother    • Stroke Father      Past Medical History:   Diagnosis Date   • Aortic insufficiency    • Asthma    • Disease of thyroid gland    • Essential hypertension    • SOB (shortness of breath)      Patient Active Problem List   Diagnosis   • Essential hypertension   • Hypothyroidism   • Gastroesophageal reflux disease without esophagitis   • Hyperlipidemia   • LBBB (left bundle branch block)   • Valvular heart disease trace AI, Mod MR   • Hypokalemia       Social History   Substance Use Topics   • Smoking status: Never Smoker   • Smokeless tobacco: Never Used   • Alcohol use No       Allergies   Allergen Reactions   • Eggs Or Egg-Derived Products    • Iron    • Losartan Hives, Itching and Swelling       Current Outpatient Prescriptions on File Prior to Visit   Medication Sig   • cetirizine (ZyrTEC) 10 MG tablet Take 10 mg by mouth daily.   • fluticasone-salmeterol (ADVAIR) 250-50 MCG/DOSE DISKUS Inhale 2 (two) times a day.   • hydrochlorothiazide (HYDRODIURIL) 25 MG tablet Take 0.5  "tablets by mouth Daily.   • omeprazole (PriLOSEC) 20 MG capsule Take 20 mg by mouth daily.   • potassium chloride (K-DUR) 10 MEQ CR tablet Take 1 tablet by mouth Daily.   • terazosin (HYTRIN) 1 MG capsule Take 1 capsule by mouth Every Night.   • [DISCONTINUED] levothyroxine (SYNTHROID, LEVOTHROID) 25 MCG tablet Take 1 tablet by mouth Daily.   • [DISCONTINUED] metoprolol tartrate (LOPRESSOR) 25 MG tablet Take 1 tablet by mouth 2 (Two) Times a Day.     No current facility-administered medications on file prior to visit.          The following portions of the patient's history were reviewed and updated as appropriate: allergies, current medications, past family history, past medical history, past social history, past surgical history and problem list.    Review of Systems   Constitution: Negative.   HENT: Negative.  Negative for congestion and headaches.    Eyes: Negative.    Cardiovascular: Negative.  Negative for chest pain, cyanosis, dyspnea on exertion, irregular heartbeat, leg swelling, near-syncope, orthopnea, palpitations, paroxysmal nocturnal dyspnea and syncope.   Respiratory: Negative.  Negative for shortness of breath.    Hematologic/Lymphatic: Negative.    Musculoskeletal: Negative.    Gastrointestinal: Negative.    Neurological: Negative.           Objective:     /94 (BP Location: Left arm)  Pulse 62  Ht 60\" (152.4 cm)  Wt 139 lb 12.8 oz (63.4 kg)  SpO2 99%  BMI 27.3 kg/m2  Physical Exam   Constitutional: She appears well-developed and well-nourished. No distress.   HENT:   Head: Normocephalic and atraumatic.   Mouth/Throat: Oropharynx is clear and moist. No oropharyngeal exudate.   Eyes: Conjunctivae and EOM are normal. Pupils are equal, round, and reactive to light. No scleral icterus.   Neck: Normal range of motion. Neck supple. No JVD present. No tracheal deviation present. No thyromegaly present.   Cardiovascular: Normal rate, regular rhythm, normal heart sounds and intact distal pulses.  " PMI is not displaced.  Exam reveals no gallop, no friction rub and no decreased pulses.    No murmur heard.  Pulses:       Carotid pulses are 3+ on the right side, and 3+ on the left side.       Radial pulses are 3+ on the right side, and 3+ on the left side.   Pulmonary/Chest: Effort normal and breath sounds normal. No respiratory distress. She has no wheezes. She has no rales. She exhibits no tenderness.   Abdominal: Soft. Bowel sounds are normal. She exhibits no distension, no abdominal bruit and no mass. There is no splenomegaly or hepatomegaly. There is no tenderness. There is no rebound and no guarding.   Musculoskeletal: Normal range of motion. She exhibits no edema, tenderness or deformity.   Lymphadenopathy:     She has no cervical adenopathy.   Neurological: She is alert. She has normal reflexes. No cranial nerve deficit. She exhibits normal muscle tone. Coordination normal.   Skin: Skin is warm and dry. No rash noted. She is not diaphoretic. No erythema.   Psychiatric: She has a normal mood and affect. Her behavior is normal. Judgment and thought content normal.         Lab Review  Lab Results   Component Value Date     07/28/2017    K 4.5 07/28/2017     07/28/2017    BUN 12 07/28/2017    CREATININE 0.77 07/28/2017    GLUCOSE 91 07/28/2017    CALCIUM 9.1 07/28/2017    ALT 14 07/28/2017    ALKPHOS 114 (H) 07/28/2017    LABIL2 1.5 07/28/2017     Lab Results   Component Value Date    CKTOTAL 65 07/28/2017     Lab Results   Component Value Date    WBC 6.43 07/28/2017    HGB 12.2 07/28/2017    HCT 38.6 07/28/2017     07/28/2017     Lab Results   Component Value Date    INR 0.89 01/17/2017    INR 0.96 07/23/2015     Lab Results   Component Value Date    MG 2.0 07/23/2015     Lab Results   Component Value Date    TSH 1.266 01/20/2017     Lab Results   Component Value Date    BNP 25.7 01/17/2017     Lab Results   Component Value Date    CHOL 214 (H) 07/28/2017    CHLPL 192 05/19/2015    TRIG 83  07/28/2017    HDL 80 07/28/2017    LDLCALC 117 (H) 07/28/2017    VLDL 16.6 07/28/2017    LDLHDL 1.47 07/28/2017         Procedures       I personally viewed and interpreted the patient's LAB data         Assessment:     1. Essential hypertension    2. Hypothyroidism due to acquired atrophy of thyroid    3. Mixed hyperlipidemia    4. LBBB (left bundle branch block)    5. Valvular heart disease trace AI, Mod MR          Plan:       Blood pressure is still elevated although it is better than before.  Patient is asymptomatic.  She was advised to take Bystolic 2.5 daily and then she will increase it to 5 mg next 48 hours depending on blood pressure readings.    Hyperlipidemia is poorly controlled.  She is willing to take Crestor.  Crestor 2.5 mg was started.  Side effects were explained.    Rest of the medications were continued.        Return in about 3 months (around 12/12/2017).

## 2017-09-27 RX ORDER — TERAZOSIN 1 MG/1
1 CAPSULE ORAL NIGHTLY
Qty: 90 CAPSULE | Refills: 0 | Status: SHIPPED | OUTPATIENT
Start: 2017-09-27 | End: 2018-07-11 | Stop reason: SDUPTHER

## 2017-09-29 RX ORDER — NEBIVOLOL 2.5 MG/1
2.5 TABLET ORAL DAILY
Qty: 90 TABLET | Refills: 0 | Status: SHIPPED | OUTPATIENT
Start: 2017-09-29 | End: 2017-12-27 | Stop reason: SDUPTHER

## 2017-09-29 RX ORDER — ROSUVASTATIN CALCIUM 5 MG/1
2.5 TABLET, COATED ORAL DAILY
Qty: 45 TABLET | Refills: 0 | Status: SHIPPED | OUTPATIENT
Start: 2017-09-29 | End: 2017-12-04 | Stop reason: SINTOL

## 2017-11-22 RX ORDER — LEVOTHYROXINE SODIUM 0.03 MG/1
TABLET ORAL
Qty: 30 TABLET | Refills: 0 | Status: SHIPPED | OUTPATIENT
Start: 2017-11-22 | End: 2017-12-04

## 2017-11-29 ENCOUNTER — LAB (OUTPATIENT)
Dept: LAB | Facility: HOSPITAL | Age: 67
End: 2017-11-29
Attending: INTERNAL MEDICINE

## 2017-11-29 DIAGNOSIS — E03.4 HYPOTHYROIDISM DUE TO ACQUIRED ATROPHY OF THYROID: ICD-10-CM

## 2017-11-29 DIAGNOSIS — E78.2 MIXED HYPERLIPIDEMIA: ICD-10-CM

## 2017-11-29 DIAGNOSIS — K85.00 IDIOPATHIC ACUTE PANCREATITIS, UNSPECIFIED COMPLICATION STATUS: ICD-10-CM

## 2017-11-29 LAB
ALBUMIN SERPL-MCNC: 4.3 G/DL (ref 3.4–4.8)
ALBUMIN/GLOB SERPL: 1.4 G/DL (ref 1.5–2.5)
ALP SERPL-CCNC: 116 U/L (ref 35–104)
ALT SERPL W P-5'-P-CCNC: 10 U/L (ref 10–36)
AMYLASE SERPL-CCNC: 120 U/L (ref 28–100)
ANION GAP SERPL CALCULATED.3IONS-SCNC: 4.7 MMOL/L (ref 3.6–11.2)
AST SERPL-CCNC: 23 U/L (ref 10–30)
BASOPHILS # BLD AUTO: 0.06 10*3/MM3 (ref 0–0.3)
BASOPHILS NFR BLD AUTO: 0.9 % (ref 0–2)
BILIRUB SERPL-MCNC: 0.5 MG/DL (ref 0.2–1.8)
BUN BLD-MCNC: 12 MG/DL (ref 7–21)
BUN/CREAT SERPL: 15.6 (ref 7–25)
CALCIUM SPEC-SCNC: 9.1 MG/DL (ref 7.7–10)
CHLORIDE SERPL-SCNC: 107 MMOL/L (ref 99–112)
CHOLEST SERPL-MCNC: 222 MG/DL (ref 0–200)
CK SERPL-CCNC: 67 U/L (ref 24–173)
CO2 SERPL-SCNC: 27.3 MMOL/L (ref 24.3–31.9)
CREAT BLD-MCNC: 0.77 MG/DL (ref 0.43–1.29)
DEPRECATED RDW RBC AUTO: 52.4 FL (ref 37–54)
EOSINOPHIL # BLD AUTO: 0.19 10*3/MM3 (ref 0–0.7)
EOSINOPHIL NFR BLD AUTO: 2.9 % (ref 0–7)
ERYTHROCYTE [DISTWIDTH] IN BLOOD BY AUTOMATED COUNT: 16.5 % (ref 11.5–14.5)
GFR SERPL CREATININE-BSD FRML MDRD: 75 ML/MIN/1.73
GLOBULIN UR ELPH-MCNC: 3.1 GM/DL
GLUCOSE BLD-MCNC: 86 MG/DL (ref 70–110)
HCT VFR BLD AUTO: 41.4 % (ref 37–47)
HDLC SERPL-MCNC: 81 MG/DL (ref 60–100)
HGB BLD-MCNC: 13 G/DL (ref 12–16)
IMM GRANULOCYTES # BLD: 0.01 10*3/MM3 (ref 0–0.03)
IMM GRANULOCYTES NFR BLD: 0.2 % (ref 0–0.5)
LDLC SERPL CALC-MCNC: 120 MG/DL (ref 0–100)
LDLC/HDLC SERPL: 1.49 {RATIO}
LYMPHOCYTES # BLD AUTO: 2.6 10*3/MM3 (ref 1–3)
LYMPHOCYTES NFR BLD AUTO: 39 % (ref 16–46)
MCH RBC QN AUTO: 27.6 PG (ref 27–33)
MCHC RBC AUTO-ENTMCNC: 31.4 G/DL (ref 33–37)
MCV RBC AUTO: 87.9 FL (ref 80–94)
MONOCYTES # BLD AUTO: 0.65 10*3/MM3 (ref 0.1–0.9)
MONOCYTES NFR BLD AUTO: 9.8 % (ref 0–12)
NEUTROPHILS # BLD AUTO: 3.15 10*3/MM3 (ref 1.4–6.5)
NEUTROPHILS NFR BLD AUTO: 47.2 % (ref 40–75)
OSMOLALITY SERPL CALC.SUM OF ELEC: 276.6 MOSM/KG (ref 273–305)
PLATELET # BLD AUTO: 152 10*3/MM3 (ref 130–400)
PMV BLD AUTO: ABNORMAL FL (ref 6–10)
POTASSIUM BLD-SCNC: 4.9 MMOL/L (ref 3.5–5.3)
PROT SERPL-MCNC: 7.4 G/DL (ref 6–8)
RBC # BLD AUTO: 4.71 10*6/MM3 (ref 4.2–5.4)
SODIUM BLD-SCNC: 139 MMOL/L (ref 135–153)
T4 FREE SERPL-MCNC: 1.05 NG/DL (ref 0.89–1.76)
TRIGL SERPL-MCNC: 103 MG/DL (ref 0–150)
TSH SERPL DL<=0.05 MIU/L-ACNC: 5.22 MIU/ML (ref 0.55–4.78)
VLDLC SERPL-MCNC: 20.6 MG/DL
WBC NRBC COR # BLD: 6.66 10*3/MM3 (ref 4.5–12.5)

## 2017-11-29 PROCEDURE — 84439 ASSAY OF FREE THYROXINE: CPT

## 2017-11-29 PROCEDURE — 84443 ASSAY THYROID STIM HORMONE: CPT

## 2017-11-29 PROCEDURE — 85025 COMPLETE CBC W/AUTO DIFF WBC: CPT

## 2017-11-29 PROCEDURE — 80053 COMPREHEN METABOLIC PANEL: CPT

## 2017-11-29 PROCEDURE — 82550 ASSAY OF CK (CPK): CPT

## 2017-11-29 PROCEDURE — 82150 ASSAY OF AMYLASE: CPT

## 2017-11-29 PROCEDURE — 80061 LIPID PANEL: CPT

## 2017-11-29 PROCEDURE — 36415 COLL VENOUS BLD VENIPUNCTURE: CPT

## 2017-12-04 ENCOUNTER — OFFICE VISIT (OUTPATIENT)
Dept: CARDIOLOGY | Facility: CLINIC | Age: 67
End: 2017-12-04

## 2017-12-04 VITALS
SYSTOLIC BLOOD PRESSURE: 142 MMHG | BODY MASS INDEX: 28.27 KG/M2 | OXYGEN SATURATION: 96 % | HEART RATE: 74 BPM | DIASTOLIC BLOOD PRESSURE: 64 MMHG | WEIGHT: 144 LBS | HEIGHT: 60 IN

## 2017-12-04 DIAGNOSIS — E03.4 HYPOTHYROIDISM DUE TO ACQUIRED ATROPHY OF THYROID: ICD-10-CM

## 2017-12-04 DIAGNOSIS — I10 ESSENTIAL HYPERTENSION: Primary | ICD-10-CM

## 2017-12-04 DIAGNOSIS — E78.2 MIXED HYPERLIPIDEMIA: ICD-10-CM

## 2017-12-04 DIAGNOSIS — I44.7 LBBB (LEFT BUNDLE BRANCH BLOCK): ICD-10-CM

## 2017-12-04 DIAGNOSIS — I38 VALVULAR HEART DISEASE: ICD-10-CM

## 2017-12-04 PROCEDURE — 99214 OFFICE O/P EST MOD 30 MIN: CPT | Performed by: INTERNAL MEDICINE

## 2017-12-04 RX ORDER — LEVOTHYROXINE SODIUM 0.05 MG/1
50 TABLET ORAL DAILY
Qty: 30 TABLET | Refills: 3 | Status: SHIPPED | OUTPATIENT
Start: 2017-12-04 | End: 2018-05-01 | Stop reason: SDUPTHER

## 2017-12-04 RX ORDER — ROSUVASTATIN CALCIUM 5 MG/1
2.5 TABLET, COATED ORAL DAILY
Qty: 45 TABLET | Refills: 0 | OUTPATIENT
Start: 2017-12-04 | End: 2017-12-27 | Stop reason: SDUPTHER

## 2017-12-04 RX ORDER — EZETIMIBE 10 MG/1
10 TABLET ORAL DAILY
Qty: 30 TABLET | Refills: 3 | Status: SHIPPED | OUTPATIENT
Start: 2017-12-04 | End: 2017-12-27 | Stop reason: SDUPTHER

## 2017-12-04 NOTE — PROGRESS NOTES
subjective     Chief Complaint   Patient presents with   • Hyperlipidemia   • Heartburn   • Hypertension     History of Present Illness  Patient has hyperlipidemia.  She has not been taking Crestor regularly because it causes myalgias.  She has never had any documented CPK increase or liver functions increased.  She just gets myalgias.  She was advised to take Crestor 2.5 mg every other day or every third day will add Zetia 10 mg daily.  Lipids were reviewed today.    Patient also has hypertension which has been very well controlled with current medications.  Patient is taking Hytrin, Bystolic and hydrochlorothiazide.    She has hypothyroidism.  She is taking Synthroid 25 µg daily.  TSH is elevated.  Clinically patient has no specific complaints.  She also has chronic left bundle branch block and valvular heart disease with the moderate mitral regurgitation and trace aortic regurgitation.  No evidence of heart failure.  Patient denies any chest pain palpitations or shortness of breath or ankle edema.    Past Surgical History:   Procedure Laterality Date   • CARDIAC CATHETERIZATION     • CARDIAC CATHETERIZATION  2015   • CARDIOVASCULAR STRESS TEST  2015   •  SECTION      x 3    • ECHO - CONVERTED  2015   • THYROID SURGERY      age 16+ 17, for cysts     Family History   Problem Relation Age of Onset   • Lung cancer Mother    • Stroke Father      Past Medical History:   Diagnosis Date   • Aortic insufficiency    • Asthma    • Disease of thyroid gland    • Essential hypertension    • SOB (shortness of breath)      Patient Active Problem List   Diagnosis   • Essential hypertension   • Hypothyroidism   • Gastroesophageal reflux disease without esophagitis   • Hyperlipidemia   • LBBB (left bundle branch block)   • Valvular heart disease trace AI, Mod MR   • Hypokalemia       Social History   Substance Use Topics   • Smoking status: Never Smoker   • Smokeless tobacco: Never Used   • Alcohol use No  "      Allergies   Allergen Reactions   • Eggs Or Egg-Derived Products    • Iron    • Losartan Hives, Itching and Swelling       Current Outpatient Prescriptions on File Prior to Visit   Medication Sig   • cetirizine (ZyrTEC) 10 MG tablet Take 10 mg by mouth daily.   • fluticasone-salmeterol (ADVAIR) 250-50 MCG/DOSE DISKUS Inhale 2 (two) times a day.   • hydrochlorothiazide (HYDRODIURIL) 25 MG tablet Take 0.5 tablets by mouth Daily.   • levothyroxine (SYNTHROID, LEVOTHROID) 25 MCG tablet TAKE 1 TABLET EVERY MORNING ON AN EMPTY STOMACH FOR THYROID   • nebivolol (BYSTOLIC) 2.5 MG tablet Take 1 tablet by mouth Daily.   • potassium chloride (K-DUR) 10 MEQ CR tablet Take 1 tablet by mouth Daily.   • terazosin (HYTRIN) 1 MG capsule Take 1 capsule by mouth Every Night.   • [DISCONTINUED] omeprazole (PriLOSEC) 20 MG capsule Take 20 mg by mouth daily.   • [DISCONTINUED] rosuvastatin (CRESTOR) 5 MG tablet Take 0.5 tablets by mouth Daily.     No current facility-administered medications on file prior to visit.          The following portions of the patient's history were reviewed and updated as appropriate: allergies, current medications, past family history, past medical history, past social history, past surgical history and problem list.    Review of Systems   Constitution: Negative.   HENT: Negative.  Negative for congestion.    Eyes: Negative.    Cardiovascular: Negative.  Negative for chest pain, cyanosis, dyspnea on exertion, irregular heartbeat, leg swelling, near-syncope, orthopnea, palpitations, paroxysmal nocturnal dyspnea and syncope.   Respiratory: Negative.  Negative for shortness of breath.    Hematologic/Lymphatic: Negative.    Musculoskeletal: Negative.    Gastrointestinal: Negative.    Genitourinary: Negative.    Neurological: Negative.  Negative for headaches.   Psychiatric/Behavioral: Negative.           Objective:     /64 (BP Location: Left arm, Patient Position: Sitting)  Pulse 74  Ht 60\" (152.4 " cm)  Wt 144 lb (65.3 kg)  SpO2 96%  Breastfeeding? No  BMI 28.12 kg/m2  Physical Exam   Constitutional: She appears well-developed and well-nourished. No distress.   HENT:   Head: Normocephalic and atraumatic.   Mouth/Throat: Oropharynx is clear and moist. No oropharyngeal exudate.   Eyes: Conjunctivae and EOM are normal. Pupils are equal, round, and reactive to light. No scleral icterus.   Neck: Normal range of motion. Neck supple. No JVD present. No tracheal deviation present. No thyromegaly present.   Cardiovascular: Normal rate, regular rhythm, normal heart sounds and intact distal pulses.  PMI is not displaced.  Exam reveals no gallop, no friction rub and no decreased pulses.    No murmur heard.  Pulses:       Carotid pulses are 3+ on the right side, and 3+ on the left side.       Radial pulses are 3+ on the right side, and 3+ on the left side.   Pulmonary/Chest: Effort normal and breath sounds normal. No respiratory distress. She has no wheezes. She has no rales. She exhibits no tenderness.   Abdominal: Soft. Bowel sounds are normal. She exhibits no distension, no abdominal bruit and no mass. There is no splenomegaly or hepatomegaly. There is no tenderness. There is no rebound and no guarding.   Musculoskeletal: Normal range of motion. She exhibits no edema, tenderness or deformity.   Lymphadenopathy:     She has no cervical adenopathy.   Neurological: She is alert. She has normal reflexes. No cranial nerve deficit. She exhibits normal muscle tone. Coordination normal.   Skin: Skin is warm and dry. No rash noted. She is not diaphoretic. No erythema.   Psychiatric: She has a normal mood and affect. Her behavior is normal. Judgment and thought content normal.         Lab Review  Lab Results   Component Value Date     11/29/2017    K 4.9 11/29/2017     11/29/2017    BUN 12 11/29/2017    CREATININE 0.77 11/29/2017    GLUCOSE 86 11/29/2017    CALCIUM 9.1 11/29/2017    ALT 10 11/29/2017    ALKPHOS  116 (H) 11/29/2017    LABIL2 1.4 (L) 11/29/2017     Lab Results   Component Value Date    CKTOTAL 67 11/29/2017     Lab Results   Component Value Date    WBC 6.66 11/29/2017    HGB 13.0 11/29/2017    HCT 41.4 11/29/2017     11/29/2017     Lab Results   Component Value Date    INR 0.89 01/17/2017    INR 0.96 07/23/2015     Lab Results   Component Value Date    MG 2.0 07/23/2015     Lab Results   Component Value Date    TSH 5.221 (H) 11/29/2017     Lab Results   Component Value Date    BNP 25.7 01/17/2017     Lab Results   Component Value Date    CHLPL 192 05/19/2015    CHOL 222 (H) 11/29/2017    TRIG 103 11/29/2017    HDL 81 11/29/2017    LDLCALC 120 (H) 11/29/2017    VLDL 20.6 11/29/2017    LDLHDL 1.49 11/29/2017         Procedures       I personally viewed and interpreted the patient's LAB data         Assessment:     1. Essential hypertension    2. Mixed hyperlipidemia    3. LBBB (left bundle branch block)    4. Valvular heart disease trace AI, Mod MR    5. Hypothyroidism due to acquired atrophy of thyroid          Plan:      TSH is elevated.  Patient was advised to increase Synthroid 50 µg daily.    Lipids are abnormal.  LDL is 122.  Patient was advised to restart Crestor 5 mg daily.  She gets aches and pains with Crestor she was advised to take it whenever amount she can.  Zetia 10 mg daily was added.  Healthy lifestyle and dietary restrictions were also emphasized.    Blood pressure is very well controlled.  She will continue taking Bystolic, Hytrin and hydrochlorothiazide.  Patient has a mild aortic and mitral regurgitation.  Clinically she is stable.    Follow-up scheduled.  Healthy lifestyle discussed.    No Follow-up on file.

## 2017-12-27 RX ORDER — NEBIVOLOL 2.5 MG/1
2.5 TABLET ORAL DAILY
Qty: 90 TABLET | Refills: 0 | Status: SHIPPED | OUTPATIENT
Start: 2017-12-27 | End: 2018-03-27 | Stop reason: SDUPTHER

## 2017-12-27 RX ORDER — EZETIMIBE 10 MG/1
10 TABLET ORAL DAILY
Qty: 90 TABLET | Refills: 0 | Status: SHIPPED | OUTPATIENT
Start: 2017-12-27 | End: 2018-04-20 | Stop reason: SDUPTHER

## 2017-12-27 RX ORDER — ROSUVASTATIN CALCIUM 5 MG/1
2.5 TABLET, COATED ORAL DAILY
Qty: 45 TABLET | Refills: 0 | Status: SHIPPED | OUTPATIENT
Start: 2017-12-27 | End: 2018-04-20 | Stop reason: SDUPTHER

## 2018-03-05 ENCOUNTER — OFFICE VISIT (OUTPATIENT)
Dept: CARDIOLOGY | Facility: CLINIC | Age: 68
End: 2018-03-05

## 2018-03-05 VITALS
HEIGHT: 60 IN | DIASTOLIC BLOOD PRESSURE: 74 MMHG | SYSTOLIC BLOOD PRESSURE: 138 MMHG | HEART RATE: 64 BPM | BODY MASS INDEX: 28.74 KG/M2 | OXYGEN SATURATION: 98 % | WEIGHT: 146.4 LBS

## 2018-03-05 DIAGNOSIS — E03.4 HYPOTHYROIDISM DUE TO ACQUIRED ATROPHY OF THYROID: ICD-10-CM

## 2018-03-05 DIAGNOSIS — R53.82 CHRONIC FATIGUE: ICD-10-CM

## 2018-03-05 DIAGNOSIS — K21.9 GASTROESOPHAGEAL REFLUX DISEASE WITHOUT ESOPHAGITIS: ICD-10-CM

## 2018-03-05 DIAGNOSIS — I44.7 LBBB (LEFT BUNDLE BRANCH BLOCK): ICD-10-CM

## 2018-03-05 DIAGNOSIS — E55.9 VITAMIN D DEFICIENCY: ICD-10-CM

## 2018-03-05 DIAGNOSIS — I10 ESSENTIAL HYPERTENSION: Primary | ICD-10-CM

## 2018-03-05 DIAGNOSIS — E78.2 MIXED HYPERLIPIDEMIA: ICD-10-CM

## 2018-03-05 DIAGNOSIS — I38 VALVULAR HEART DISEASE: ICD-10-CM

## 2018-03-05 PROCEDURE — 99213 OFFICE O/P EST LOW 20 MIN: CPT | Performed by: INTERNAL MEDICINE

## 2018-03-05 RX ORDER — CHOLESTYRAMINE 4 G/9G
1 POWDER, FOR SUSPENSION ORAL 2 TIMES DAILY
Qty: 180 PACKET | Refills: 2 | Status: SHIPPED | OUTPATIENT
Start: 2018-03-05 | End: 2018-07-11 | Stop reason: SDUPTHER

## 2018-03-05 NOTE — PROGRESS NOTES
subjective     Chief Complaint   Patient presents with   • Hypertension   • Hyperlipidemia   • Valvular Heart Disease trace Al, Mod MR     History of Present Illness  Patient is here for cardiology follow-up.  Patient has history of hypertension, hyperlipidemia, chronic left bundle branch block and valvular heart disease.    Patient denies any chest pain palpitations or shortness of breath.    She has tried all statins.  Last visit she was given Crestor 2.5 mg daily but she could not tolerate that patient stated that she took it for a week and then stopped it.  She also stopped Zetia.    Past Surgical History:   Procedure Laterality Date   • CARDIAC CATHETERIZATION     • CARDIAC CATHETERIZATION  2015   • CARDIOVASCULAR STRESS TEST  2015   •  SECTION      x 3    • ECHO - CONVERTED  2015   • THYROID SURGERY      age 16+ 17, for cysts     Family History   Problem Relation Age of Onset   • Lung cancer Mother    • Stroke Father      Past Medical History:   Diagnosis Date   • Aortic insufficiency    • Asthma    • Disease of thyroid gland    • Essential hypertension    • SOB (shortness of breath)      Patient Active Problem List   Diagnosis   • Essential hypertension   • Hypothyroidism   • Gastroesophageal reflux disease without esophagitis   • Hyperlipidemia   • LBBB (left bundle branch block)   • Valvular heart disease trace AI, Mod MR   • Hypokalemia       Social History   Substance Use Topics   • Smoking status: Never Smoker   • Smokeless tobacco: Never Used   • Alcohol use No       Allergies   Allergen Reactions   • Eggs Or Egg-Derived Products    • Iron    • Losartan Hives, Itching and Swelling       Current Outpatient Prescriptions on File Prior to Visit   Medication Sig   • cetirizine (ZyrTEC) 10 MG tablet Take 10 mg by mouth daily.   • fluticasone-salmeterol (ADVAIR) 250-50 MCG/DOSE DISKUS Inhale 2 (two) times a day.   • hydrochlorothiazide (HYDRODIURIL) 25 MG tablet Take 0.5 tablets by mouth  "Daily.   • levothyroxine (SYNTHROID, LEVOTHROID) 50 MCG tablet Take 1 tablet by mouth Daily.   • nebivolol (BYSTOLIC) 2.5 MG tablet Take 1 tablet by mouth Daily.   • potassium chloride (K-DUR) 10 MEQ CR tablet Take 1 tablet by mouth Daily.   • rosuvastatin (CRESTOR) 5 MG tablet Take 0.5 tablets by mouth Daily.   • terazosin (HYTRIN) 1 MG capsule Take 1 capsule by mouth Every Night.   • ezetimibe (ZETIA) 10 MG tablet Take 1 tablet by mouth Daily.     No current facility-administered medications on file prior to visit.          The following portions of the patient's history were reviewed and updated as appropriate: allergies, current medications, past family history, past medical history, past social history, past surgical history and problem list.    Review of Systems   Constitution: Negative.   HENT: Negative.  Negative for congestion.    Eyes: Negative.    Cardiovascular: Negative.  Negative for chest pain, cyanosis, dyspnea on exertion, irregular heartbeat, leg swelling, near-syncope, orthopnea, palpitations, paroxysmal nocturnal dyspnea and syncope.   Respiratory: Negative.  Negative for shortness of breath.    Hematologic/Lymphatic: Negative.    Musculoskeletal: Negative.    Gastrointestinal: Negative.    Neurological: Negative.  Negative for headaches.          Objective:     /74 (BP Location: Left arm, Patient Position: Sitting, Cuff Size: Adult)  Pulse 64  Ht 152.4 cm (60\")  Wt 66.4 kg (146 lb 6.4 oz)  SpO2 98%  BMI 28.59 kg/m2  Physical Exam   Constitutional: She appears well-developed and well-nourished. No distress.   HENT:   Head: Normocephalic and atraumatic.   Mouth/Throat: Oropharynx is clear and moist. No oropharyngeal exudate.   Eyes: Conjunctivae and EOM are normal. Pupils are equal, round, and reactive to light. No scleral icterus.   Neck: Normal range of motion. Neck supple. No JVD present. No tracheal deviation present. No thyromegaly present.   Cardiovascular: Normal rate, regular " rhythm, normal heart sounds and intact distal pulses.  PMI is not displaced.  Exam reveals no gallop, no friction rub and no decreased pulses.    No murmur heard.  Pulses:       Carotid pulses are 3+ on the right side, and 3+ on the left side.       Radial pulses are 3+ on the right side, and 3+ on the left side.   Pulmonary/Chest: Effort normal and breath sounds normal. No respiratory distress. She has no wheezes. She has no rales. She exhibits no tenderness.   Abdominal: Soft. Bowel sounds are normal. She exhibits no distension, no abdominal bruit and no mass. There is no splenomegaly or hepatomegaly. There is no tenderness. There is no rebound and no guarding.   Musculoskeletal: Normal range of motion. She exhibits no edema, tenderness or deformity.   Lymphadenopathy:     She has no cervical adenopathy.   Neurological: She is alert. She has normal reflexes. No cranial nerve deficit. She exhibits normal muscle tone. Coordination normal.   Skin: Skin is warm and dry. No rash noted. She is not diaphoretic. No erythema.   Psychiatric: She has a normal mood and affect. Her behavior is normal. Judgment and thought content normal.         Lab Review  Lab Results   Component Value Date     11/29/2017    K 4.9 11/29/2017     11/29/2017    BUN 12 11/29/2017    CREATININE 0.77 11/29/2017    GLUCOSE 86 11/29/2017    CALCIUM 9.1 11/29/2017    ALT 10 11/29/2017    ALKPHOS 116 (H) 11/29/2017    LABIL2 1.4 (L) 11/29/2017     Lab Results   Component Value Date    CKTOTAL 67 11/29/2017     Lab Results   Component Value Date    WBC 6.66 11/29/2017    HGB 13.0 11/29/2017    HCT 41.4 11/29/2017     11/29/2017     Lab Results   Component Value Date    INR 0.89 01/17/2017    INR 0.96 07/23/2015     Lab Results   Component Value Date    MG 2.0 07/23/2015     Lab Results   Component Value Date    TSH 5.221 (H) 11/29/2017     Lab Results   Component Value Date    BNP 25.7 01/17/2017     Lab Results   Component Value  Date    CHLPL 192 05/19/2015    CHOL 222 (H) 11/29/2017    TRIG 103 11/29/2017    HDL 81 11/29/2017    VLDL 20.6 11/29/2017    LDLHDL 1.49 11/29/2017         Procedures       I personally viewed and interpreted the patient's LAB data         Assessment:     1. Essential hypertension    2. Mixed hyperlipidemia    3. LBBB (left bundle branch block)    4. Valvular heart disease trace AI, Mod MR    5. Gastroesophageal reflux disease without esophagitis    6. Hypothyroidism due to acquired atrophy of thyroid    7. Chronic fatigue    8. Vitamin D deficiency          Plan:      Blood pressure is very well controlled.  She was advised to continue taking Bystolic, hydrochlorothiazide potassium and Hytrin.    Hyperlipidemia is untreated and uncontrolled.  She could not tolerate any statins.  She was advised to restart Zetia and take questron.    She has moderate mitral regurgitation.  She is clinically stable there is no evidence of heart failure.  Follow-up scheduled with lab work        Return in about 3 months (around 6/5/2018).

## 2018-03-27 RX ORDER — NEBIVOLOL HYDROCHLORIDE 2.5 MG/1
2.5 TABLET ORAL DAILY
Qty: 90 TABLET | Refills: 0 | Status: SHIPPED | OUTPATIENT
Start: 2018-03-27 | End: 2018-06-27 | Stop reason: SDUPTHER

## 2018-04-20 ENCOUNTER — TELEPHONE (OUTPATIENT)
Dept: CARDIOLOGY | Facility: CLINIC | Age: 68
End: 2018-04-20

## 2018-04-20 RX ORDER — EZETIMIBE 10 MG/1
10 TABLET ORAL DAILY
Qty: 90 TABLET | Refills: 0 | Status: SHIPPED | OUTPATIENT
Start: 2018-04-20 | End: 2018-07-11 | Stop reason: SDUPTHER

## 2018-04-20 RX ORDER — ROSUVASTATIN CALCIUM 5 MG/1
2.5 TABLET, COATED ORAL DAILY
Qty: 45 TABLET | Refills: 0 | Status: SHIPPED | OUTPATIENT
Start: 2018-04-20 | End: 2018-04-20 | Stop reason: ALTCHOICE

## 2018-04-20 NOTE — TELEPHONE ENCOUNTER
Patient insurance will no longer cover Rosuvastatin 50mg.. They will cover Simvastatin, Atorvastatin, pravastatin, or lovastatin. Do you want to change it to one of these?

## 2018-04-20 NOTE — TELEPHONE ENCOUNTER
Spoke to patient is has not been taking Rosuvastatin 5mg since last office visit. I will inform the drug store.

## 2018-04-20 NOTE — TELEPHONE ENCOUNTER
They do not make Crestor 50  According to my notes she is not taking any Crestor or any statin therapy.  She was given Crestor 5 mg and she took half of it for 1 week and then never took it again.  Confirm with her if she is taking Crestor , how often and how much.  Last visit she was told to take Zetia and questran and that is what she needs to keep taking

## 2018-05-01 RX ORDER — LEVOTHYROXINE SODIUM 0.05 MG/1
50 TABLET ORAL DAILY
Qty: 90 TABLET | Refills: 0 | Status: SHIPPED | OUTPATIENT
Start: 2018-05-01 | End: 2018-07-11 | Stop reason: SDUPTHER

## 2018-06-27 RX ORDER — NEBIVOLOL HYDROCHLORIDE 2.5 MG/1
TABLET ORAL
Qty: 90 TABLET | Refills: 0 | Status: SHIPPED | OUTPATIENT
Start: 2018-06-27 | End: 2018-07-11 | Stop reason: SDUPTHER

## 2018-07-06 ENCOUNTER — LAB (OUTPATIENT)
Dept: LAB | Facility: HOSPITAL | Age: 68
End: 2018-07-06

## 2018-07-06 DIAGNOSIS — R53.82 CHRONIC FATIGUE: ICD-10-CM

## 2018-07-06 DIAGNOSIS — E78.2 MIXED HYPERLIPIDEMIA: ICD-10-CM

## 2018-07-06 DIAGNOSIS — E03.4 HYPOTHYROIDISM DUE TO ACQUIRED ATROPHY OF THYROID: ICD-10-CM

## 2018-07-06 DIAGNOSIS — E55.9 VITAMIN D DEFICIENCY: ICD-10-CM

## 2018-07-06 LAB
25(OH)D3 SERPL-MCNC: 20 NG/ML
ALBUMIN SERPL-MCNC: 4.2 G/DL (ref 3.4–4.8)
ALBUMIN/GLOB SERPL: 1.5 G/DL (ref 1.5–2.5)
ALP SERPL-CCNC: 103 U/L (ref 35–104)
ALT SERPL W P-5'-P-CCNC: 12 U/L (ref 10–36)
ANION GAP SERPL CALCULATED.3IONS-SCNC: 6.7 MMOL/L (ref 3.6–11.2)
ANISOCYTOSIS BLD QL: NORMAL
AST SERPL-CCNC: 22 U/L (ref 10–30)
BASOPHILS # BLD AUTO: 0.05 10*3/MM3 (ref 0–0.3)
BASOPHILS NFR BLD AUTO: 0.7 % (ref 0–2)
BILIRUB SERPL-MCNC: 0.5 MG/DL (ref 0.2–1.8)
BUN BLD-MCNC: 12 MG/DL (ref 7–21)
BUN/CREAT SERPL: 14.8 (ref 7–25)
CALCIUM SPEC-SCNC: 8.9 MG/DL (ref 7.7–10)
CHLORIDE SERPL-SCNC: 109 MMOL/L (ref 99–112)
CHOLEST SERPL-MCNC: 187 MG/DL (ref 0–200)
CO2 SERPL-SCNC: 25.3 MMOL/L (ref 24.3–31.9)
CREAT BLD-MCNC: 0.81 MG/DL (ref 0.43–1.29)
DEPRECATED RDW RBC AUTO: 50.3 FL (ref 37–54)
EOSINOPHIL # BLD AUTO: 0.22 10*3/MM3 (ref 0–0.7)
EOSINOPHIL NFR BLD AUTO: 3.3 % (ref 0–7)
ERYTHROCYTE [DISTWIDTH] IN BLOOD BY AUTOMATED COUNT: 15.9 % (ref 11.5–14.5)
GFR SERPL CREATININE-BSD FRML MDRD: 71 ML/MIN/1.73
GLOBULIN UR ELPH-MCNC: 2.8 GM/DL
GLUCOSE BLD-MCNC: 90 MG/DL (ref 70–110)
HCT VFR BLD AUTO: 38.5 % (ref 37–47)
HDLC SERPL-MCNC: 72 MG/DL (ref 60–100)
HGB BLD-MCNC: 12.2 G/DL (ref 12–16)
HYPOCHROMIA BLD QL: NORMAL
IMM GRANULOCYTES # BLD: 0.01 10*3/MM3 (ref 0–0.03)
IMM GRANULOCYTES NFR BLD: 0.1 % (ref 0–0.5)
LARGE PLATELETS: NORMAL
LDLC SERPL CALC-MCNC: 97 MG/DL (ref 0–100)
LDLC/HDLC SERPL: 1.35 {RATIO}
LYMPHOCYTES # BLD AUTO: 2.81 10*3/MM3 (ref 1–3)
LYMPHOCYTES NFR BLD AUTO: 41.8 % (ref 16–46)
MCH RBC QN AUTO: 27.5 PG (ref 27–33)
MCHC RBC AUTO-ENTMCNC: 31.7 G/DL (ref 33–37)
MCV RBC AUTO: 86.7 FL (ref 80–94)
MONOCYTES # BLD AUTO: 0.56 10*3/MM3 (ref 0.1–0.9)
MONOCYTES NFR BLD AUTO: 8.3 % (ref 0–12)
NEUTROPHILS # BLD AUTO: 3.07 10*3/MM3 (ref 1.4–6.5)
NEUTROPHILS NFR BLD AUTO: 45.8 % (ref 40–75)
OSMOLALITY SERPL CALC.SUM OF ELEC: 280.5 MOSM/KG (ref 273–305)
PLATELET # BLD AUTO: 149 10*3/MM3 (ref 130–400)
PMV BLD AUTO: ABNORMAL FL (ref 6–10)
POTASSIUM BLD-SCNC: 4.3 MMOL/L (ref 3.5–5.3)
PROT SERPL-MCNC: 7 G/DL (ref 6–8)
RBC # BLD AUTO: 4.44 10*6/MM3 (ref 4.2–5.4)
SODIUM BLD-SCNC: 141 MMOL/L (ref 135–153)
T4 FREE SERPL-MCNC: 1.3 NG/DL (ref 0.89–1.76)
TRIGL SERPL-MCNC: 89 MG/DL (ref 0–150)
TSH SERPL DL<=0.05 MIU/L-ACNC: 2.03 MIU/ML (ref 0.55–4.78)
VIT B12 BLD-MCNC: 372 PG/ML (ref 211–911)
VLDLC SERPL-MCNC: 17.8 MG/DL
WBC NRBC COR # BLD: 6.72 10*3/MM3 (ref 4.5–12.5)

## 2018-07-06 PROCEDURE — 80053 COMPREHEN METABOLIC PANEL: CPT

## 2018-07-06 PROCEDURE — 80061 LIPID PANEL: CPT

## 2018-07-06 PROCEDURE — 82306 VITAMIN D 25 HYDROXY: CPT

## 2018-07-06 PROCEDURE — 84439 ASSAY OF FREE THYROXINE: CPT

## 2018-07-06 PROCEDURE — 84443 ASSAY THYROID STIM HORMONE: CPT

## 2018-07-06 PROCEDURE — 85025 COMPLETE CBC W/AUTO DIFF WBC: CPT

## 2018-07-06 PROCEDURE — 82607 VITAMIN B-12: CPT

## 2018-07-06 PROCEDURE — 85007 BL SMEAR W/DIFF WBC COUNT: CPT

## 2018-07-11 ENCOUNTER — OFFICE VISIT (OUTPATIENT)
Dept: CARDIOLOGY | Facility: CLINIC | Age: 68
End: 2018-07-11

## 2018-07-11 VITALS
WEIGHT: 147 LBS | HEIGHT: 60 IN | DIASTOLIC BLOOD PRESSURE: 72 MMHG | SYSTOLIC BLOOD PRESSURE: 136 MMHG | BODY MASS INDEX: 28.86 KG/M2 | OXYGEN SATURATION: 98 % | HEART RATE: 64 BPM

## 2018-07-11 DIAGNOSIS — I44.7 LBBB (LEFT BUNDLE BRANCH BLOCK): ICD-10-CM

## 2018-07-11 DIAGNOSIS — I38 VALVULAR HEART DISEASE: ICD-10-CM

## 2018-07-11 DIAGNOSIS — I10 ESSENTIAL HYPERTENSION: ICD-10-CM

## 2018-07-11 DIAGNOSIS — E03.4 HYPOTHYROIDISM DUE TO ACQUIRED ATROPHY OF THYROID: ICD-10-CM

## 2018-07-11 DIAGNOSIS — E78.2 MIXED HYPERLIPIDEMIA: Primary | ICD-10-CM

## 2018-07-11 PROCEDURE — 99214 OFFICE O/P EST MOD 30 MIN: CPT | Performed by: INTERNAL MEDICINE

## 2018-07-11 RX ORDER — LEVOTHYROXINE SODIUM 0.05 MG/1
50 TABLET ORAL DAILY
Qty: 90 TABLET | Refills: 3 | Status: SHIPPED | OUTPATIENT
Start: 2018-07-11 | End: 2019-07-01 | Stop reason: SDUPTHER

## 2018-07-11 RX ORDER — HYDROCHLOROTHIAZIDE 25 MG/1
12.5 TABLET ORAL DAILY
Qty: 45 TABLET | Refills: 3 | Status: SHIPPED | OUTPATIENT
Start: 2018-07-11 | End: 2019-07-01 | Stop reason: SDUPTHER

## 2018-07-11 RX ORDER — EZETIMIBE 10 MG/1
10 TABLET ORAL DAILY
Qty: 90 TABLET | Refills: 0 | Status: SHIPPED | OUTPATIENT
Start: 2018-07-11 | End: 2018-10-29 | Stop reason: SDUPTHER

## 2018-07-11 RX ORDER — TERAZOSIN 1 MG/1
1 CAPSULE ORAL NIGHTLY
Qty: 90 CAPSULE | Refills: 3 | Status: SHIPPED | OUTPATIENT
Start: 2018-07-11 | End: 2018-09-14

## 2018-07-11 RX ORDER — POTASSIUM CHLORIDE 750 MG/1
10 TABLET, FILM COATED, EXTENDED RELEASE ORAL DAILY
Qty: 90 TABLET | Refills: 3 | Status: SHIPPED | OUTPATIENT
Start: 2018-07-11 | End: 2019-08-09 | Stop reason: SDUPTHER

## 2018-07-11 RX ORDER — NEBIVOLOL 2.5 MG/1
2.5 TABLET ORAL DAILY
Qty: 90 TABLET | Refills: 3 | Status: SHIPPED | OUTPATIENT
Start: 2018-07-11 | End: 2018-10-29

## 2018-07-11 RX ORDER — CHOLESTYRAMINE 4 G/9G
1 POWDER, FOR SUSPENSION ORAL 2 TIMES DAILY
Qty: 180 PACKET | Refills: 3 | Status: SHIPPED | OUTPATIENT
Start: 2018-07-11 | End: 2021-09-16 | Stop reason: SDUPTHER

## 2018-07-11 NOTE — PATIENT INSTRUCTIONS

## 2018-07-11 NOTE — PROGRESS NOTES
subjective     Chief Complaint   Patient presents with   • Hypertension   • Hyperlipidemia   • Follow-up   • Results     LABS     History of Present Illness    Patient is 68 years old white female who is here for follow-up.  Patient has multiple chronic medical problems.  Essential hypertension is being treated with Bystolic, Hytrin and hydrochlorothiazide.  Blood pressure is very well controlled.  There are no side effects.    Patient also has hyperlipidemia.  She is statin intolerant but she is taking cholestyramine and Zetia lab work will be reviewed and discussed.    Hypothyroidism is being treated with Synthroid 50 µg daily.      She also has valvular heart disease with a trivial aortic insufficiency and moderate mitral regurgitation.  She denies any chest pain palpitations or shortness of breath.    Past Surgical History:   Procedure Laterality Date   • CARDIAC CATHETERIZATION     • CARDIAC CATHETERIZATION  2015   • CARDIOVASCULAR STRESS TEST  2015   •  SECTION      x 3    • ECHO - CONVERTED  2015   • THYROID SURGERY      age 14+ 15, for cysts     Family History   Problem Relation Age of Onset   • Lung cancer Mother    • Stroke Father      Past Medical History:   Diagnosis Date   • Aortic insufficiency    • Asthma    • Disease of thyroid gland    • Essential hypertension    • SOB (shortness of breath)      Patient Active Problem List   Diagnosis   • Essential hypertension   • Hypothyroidism   • Gastroesophageal reflux disease without esophagitis   • Hyperlipidemia   • LBBB (left bundle branch block)   • Valvular heart disease trace AI, Mod MR   • Hypokalemia       Social History   Substance Use Topics   • Smoking status: Never Smoker   • Smokeless tobacco: Never Used   • Alcohol use No       Allergies   Allergen Reactions   • Eggs Or Egg-Derived Products    • Iron    • Losartan Hives, Itching and Swelling       Current Outpatient Prescriptions on File Prior to Visit   Medication Sig   •  "cetirizine (ZyrTEC) 10 MG tablet Take 10 mg by mouth daily.   • fluticasone-salmeterol (ADVAIR) 250-50 MCG/DOSE DISKUS Inhale 2 (two) times a day.   • [DISCONTINUED] BYSTOLIC 2.5 MG tablet TAKE 1 TABLET BY MOUTH DAILY FOR BLOOD PRESSURE   • [DISCONTINUED] cholestyramine (QUESTRAN) 4 g packet Take 1 packet by mouth 2 (Two) Times a Day.   • [DISCONTINUED] ezetimibe (ZETIA) 10 MG tablet Take 1 tablet by mouth Daily.   • [DISCONTINUED] hydrochlorothiazide (HYDRODIURIL) 25 MG tablet Take 0.5 tablets by mouth Daily.   • [DISCONTINUED] levothyroxine (SYNTHROID, LEVOTHROID) 50 MCG tablet Take 1 tablet by mouth Daily.   • [DISCONTINUED] potassium chloride (K-DUR) 10 MEQ CR tablet Take 1 tablet by mouth Daily.   • [DISCONTINUED] terazosin (HYTRIN) 1 MG capsule Take 1 capsule by mouth Every Night.     No current facility-administered medications on file prior to visit.          The following portions of the patient's history were reviewed and updated as appropriate: allergies, current medications, past family history, past medical history, past social history, past surgical history and problem list.    Review of Systems   Constitution: Negative.   HENT: Negative.  Negative for congestion.    Eyes: Negative.    Cardiovascular: Negative.  Negative for chest pain, cyanosis, dyspnea on exertion, irregular heartbeat, leg swelling, near-syncope, orthopnea, palpitations, paroxysmal nocturnal dyspnea and syncope.   Respiratory: Negative.  Negative for shortness of breath.    Hematologic/Lymphatic: Negative.    Musculoskeletal: Negative.    Gastrointestinal: Negative.    Neurological: Negative.  Negative for headaches.          Objective:     /72 (BP Location: Left arm, Patient Position: Sitting)   Pulse 64   Ht 152.4 cm (60\")   Wt 66.7 kg (147 lb)   SpO2 98%   BMI 28.71 kg/m²   Physical Exam   Constitutional: She appears well-developed and well-nourished. No distress.   HENT:   Head: Normocephalic and atraumatic. "   Mouth/Throat: Oropharynx is clear and moist. No oropharyngeal exudate.   Eyes: Conjunctivae and EOM are normal. Pupils are equal, round, and reactive to light. No scleral icterus.   Neck: Normal range of motion. Neck supple. No JVD present. No tracheal deviation present. No thyromegaly present.   Cardiovascular: Normal rate, regular rhythm, normal heart sounds and intact distal pulses.  PMI is not displaced.  Exam reveals no gallop, no friction rub and no decreased pulses.    No murmur heard.  Pulses:       Carotid pulses are 3+ on the right side, and 3+ on the left side.       Radial pulses are 3+ on the right side, and 3+ on the left side.   Pulmonary/Chest: Effort normal and breath sounds normal. No respiratory distress. She has no wheezes. She has no rales. She exhibits no tenderness.   Abdominal: Soft. Bowel sounds are normal. She exhibits no distension, no abdominal bruit and no mass. There is no splenomegaly or hepatomegaly. There is no tenderness. There is no rebound and no guarding.   Musculoskeletal: Normal range of motion. She exhibits no edema, tenderness or deformity.   Lymphadenopathy:     She has no cervical adenopathy.   Neurological: She is alert. She has normal reflexes. No cranial nerve deficit. She exhibits normal muscle tone. Coordination normal.   Skin: Skin is warm and dry. No rash noted. She is not diaphoretic. No erythema.   Psychiatric: She has a normal mood and affect. Her behavior is normal. Judgment and thought content normal.         Lab Review  Lab Results   Component Value Date     07/06/2018    K 4.3 07/06/2018     07/06/2018    BUN 12 07/06/2018    CREATININE 0.81 07/06/2018    GLUCOSE 90 07/06/2018    CALCIUM 8.9 07/06/2018    ALT 12 07/06/2018    ALKPHOS 103 07/06/2018    LABIL2 1.5 07/06/2018     Lab Results   Component Value Date    CKTOTAL 67 11/29/2017     Lab Results   Component Value Date    WBC 6.72 07/06/2018    HGB 12.2 07/06/2018    HCT 38.5 07/06/2018      07/06/2018     Lab Results   Component Value Date    INR 0.89 01/17/2017    INR 0.96 07/23/2015     Lab Results   Component Value Date    MG 2.0 07/23/2015     Lab Results   Component Value Date    TSH 2.034 07/06/2018     Lab Results   Component Value Date    BNP 25.7 01/17/2017     Lab Results   Component Value Date    CHLPL 192 05/19/2015    CHOL 187 07/06/2018    TRIG 89 07/06/2018    HDL 72 07/06/2018    VLDL 17.8 07/06/2018    LDLHDL 1.35 07/06/2018     Lab Results   Component Value Date    LDL 97 07/06/2018     (H) 11/29/2017       Procedures       I personally viewed and interpreted the patient's LAB data         Assessment:     1. Mixed hyperlipidemia    2. Essential hypertension    3. Valvular heart disease trace AI, Mod MR    4. LBBB (left bundle branch block)    5. Hypothyroidism due to acquired atrophy of thyroid          Plan:      Labs reviewed and discussed with the patient  Lipid panel is normal patient will continue Questran and Zetia.    Blood pressure is very well controlled.  Patient will continue Bystolic hydrochlorothiazide and Hytrin.    Hypokalemia is corrected with K Dur which will be continued.    Thyroid functions also normal Synthroid 50 µg were continued.    Healthy lifestyle and risk factor modification discussed.  Follow-up scheduled  Refills were given      Return in about 3 months (around 10/11/2018).

## 2018-09-05 ENCOUNTER — HOSPITAL ENCOUNTER (EMERGENCY)
Facility: HOSPITAL | Age: 68
Discharge: HOME | End: 2018-09-05
Attending: EMERGENCY MEDICINE | Admitting: EMERGENCY MEDICINE

## 2018-09-05 ENCOUNTER — APPOINTMENT (OUTPATIENT)
Dept: GENERAL RADIOLOGY | Facility: HOSPITAL | Age: 68
End: 2018-09-05

## 2018-09-05 VITALS
OXYGEN SATURATION: 99 % | DIASTOLIC BLOOD PRESSURE: 86 MMHG | SYSTOLIC BLOOD PRESSURE: 152 MMHG | TEMPERATURE: 98.4 F | HEIGHT: 60 IN | RESPIRATION RATE: 18 BRPM | BODY MASS INDEX: 28.47 KG/M2 | WEIGHT: 145 LBS | HEART RATE: 70 BPM

## 2018-09-05 DIAGNOSIS — R07.89 ATYPICAL CHEST PAIN: Primary | ICD-10-CM

## 2018-09-05 LAB
ALBUMIN SERPL-MCNC: 4.5 G/DL (ref 3.4–4.8)
ALBUMIN/GLOB SERPL: 1.5 G/DL (ref 1.5–2.5)
ALP SERPL-CCNC: 122 U/L (ref 35–104)
ALT SERPL W P-5'-P-CCNC: 17 U/L (ref 10–36)
ANION GAP SERPL CALCULATED.3IONS-SCNC: 12.5 MMOL/L (ref 3.6–11.2)
ANISOCYTOSIS BLD QL: NORMAL
AST SERPL-CCNC: 22 U/L (ref 10–30)
BASOPHILS # BLD AUTO: 0.04 10*3/MM3 (ref 0–0.3)
BASOPHILS NFR BLD AUTO: 0.4 % (ref 0–2)
BILIRUB SERPL-MCNC: 0.5 MG/DL (ref 0.2–1.8)
BNP SERPL-MCNC: 28 PG/ML (ref 0–100)
BUN BLD-MCNC: 19 MG/DL (ref 7–21)
BUN/CREAT SERPL: 21.1 (ref 7–25)
CALCIUM SPEC-SCNC: 9.4 MG/DL (ref 7.7–10)
CHLORIDE SERPL-SCNC: 103 MMOL/L (ref 99–112)
CO2 SERPL-SCNC: 24.5 MMOL/L (ref 24.3–31.9)
CREAT BLD-MCNC: 0.9 MG/DL (ref 0.43–1.29)
DEPRECATED RDW RBC AUTO: 51.4 FL (ref 37–54)
EOSINOPHIL # BLD AUTO: 0.04 10*3/MM3 (ref 0–0.7)
EOSINOPHIL NFR BLD AUTO: 0.4 % (ref 0–7)
ERYTHROCYTE [DISTWIDTH] IN BLOOD BY AUTOMATED COUNT: 16.4 % (ref 11.5–14.5)
GFR SERPL CREATININE-BSD FRML MDRD: 63 ML/MIN/1.73
GLOBULIN UR ELPH-MCNC: 3 GM/DL
GLUCOSE BLD-MCNC: 94 MG/DL (ref 70–110)
HCT VFR BLD AUTO: 40.6 % (ref 37–47)
HGB BLD-MCNC: 13.2 G/DL (ref 12–16)
HYPOCHROMIA BLD QL: NORMAL
IMM GRANULOCYTES # BLD: 0.02 10*3/MM3 (ref 0–0.03)
IMM GRANULOCYTES NFR BLD: 0.2 % (ref 0–0.5)
LARGE PLATELETS: NORMAL
LIPASE SERPL-CCNC: 44 U/L (ref 13–60)
LYMPHOCYTES # BLD AUTO: 1.66 10*3/MM3 (ref 1–3)
LYMPHOCYTES NFR BLD AUTO: 16.5 % (ref 16–46)
MCH RBC QN AUTO: 27.7 PG (ref 27–33)
MCHC RBC AUTO-ENTMCNC: 32.5 G/DL (ref 33–37)
MCV RBC AUTO: 85.1 FL (ref 80–94)
MONOCYTES # BLD AUTO: 0.69 10*3/MM3 (ref 0.1–0.9)
MONOCYTES NFR BLD AUTO: 6.9 % (ref 0–12)
NEUTROPHILS # BLD AUTO: 7.6 10*3/MM3 (ref 1.4–6.5)
NEUTROPHILS NFR BLD AUTO: 75.6 % (ref 40–75)
OSMOLALITY SERPL CALC.SUM OF ELEC: 281.4 MOSM/KG (ref 273–305)
PLATELET # BLD AUTO: 144 10*3/MM3 (ref 130–400)
PMV BLD AUTO: ABNORMAL FL (ref 6–10)
POTASSIUM BLD-SCNC: 3.5 MMOL/L (ref 3.5–5.3)
PROT SERPL-MCNC: 7.5 G/DL (ref 6–8)
RBC # BLD AUTO: 4.77 10*6/MM3 (ref 4.2–5.4)
SODIUM BLD-SCNC: 140 MMOL/L (ref 135–153)
TROPONIN I SERPL-MCNC: 0.01 NG/ML
WBC NRBC COR # BLD: 10.05 10*3/MM3 (ref 4.5–12.5)

## 2018-09-05 PROCEDURE — 71046 X-RAY EXAM CHEST 2 VIEWS: CPT

## 2018-09-05 PROCEDURE — 85007 BL SMEAR W/DIFF WBC COUNT: CPT | Performed by: EMERGENCY MEDICINE

## 2018-09-05 PROCEDURE — 83690 ASSAY OF LIPASE: CPT | Performed by: EMERGENCY MEDICINE

## 2018-09-05 PROCEDURE — 99284 EMERGENCY DEPT VISIT MOD MDM: CPT

## 2018-09-05 PROCEDURE — 93005 ELECTROCARDIOGRAM TRACING: CPT | Performed by: EMERGENCY MEDICINE

## 2018-09-05 PROCEDURE — 83880 ASSAY OF NATRIURETIC PEPTIDE: CPT | Performed by: EMERGENCY MEDICINE

## 2018-09-05 PROCEDURE — 80053 COMPREHEN METABOLIC PANEL: CPT | Performed by: EMERGENCY MEDICINE

## 2018-09-05 PROCEDURE — 85025 COMPLETE CBC W/AUTO DIFF WBC: CPT | Performed by: EMERGENCY MEDICINE

## 2018-09-05 PROCEDURE — 71046 X-RAY EXAM CHEST 2 VIEWS: CPT | Performed by: RADIOLOGY

## 2018-09-05 PROCEDURE — 93010 ELECTROCARDIOGRAM REPORT: CPT | Performed by: INTERNAL MEDICINE

## 2018-09-05 PROCEDURE — 84484 ASSAY OF TROPONIN QUANT: CPT | Performed by: EMERGENCY MEDICINE

## 2018-09-05 RX ORDER — CLONIDINE HYDROCHLORIDE 0.1 MG/1
0.1 TABLET ORAL ONCE
Status: COMPLETED | OUTPATIENT
Start: 2018-09-05 | End: 2018-09-05

## 2018-09-05 RX ADMIN — CLONIDINE HYDROCHLORIDE 0.1 MG: 0.1 TABLET ORAL at 18:09

## 2018-09-05 RX ADMIN — SODIUM CHLORIDE 500 ML: 9 INJECTION, SOLUTION INTRAVENOUS at 18:10

## 2018-09-14 ENCOUNTER — OFFICE VISIT (OUTPATIENT)
Dept: CARDIOLOGY | Facility: CLINIC | Age: 68
End: 2018-09-14

## 2018-09-14 VITALS
DIASTOLIC BLOOD PRESSURE: 100 MMHG | HEART RATE: 74 BPM | OXYGEN SATURATION: 98 % | WEIGHT: 146 LBS | SYSTOLIC BLOOD PRESSURE: 166 MMHG | BODY MASS INDEX: 28.66 KG/M2 | HEIGHT: 60 IN

## 2018-09-14 DIAGNOSIS — I10 ESSENTIAL HYPERTENSION: Primary | ICD-10-CM

## 2018-09-14 DIAGNOSIS — I38 VALVULAR HEART DISEASE: ICD-10-CM

## 2018-09-14 DIAGNOSIS — R07.9 CHEST PAIN IN ADULT: ICD-10-CM

## 2018-09-14 DIAGNOSIS — E78.2 MIXED HYPERLIPIDEMIA: ICD-10-CM

## 2018-09-14 DIAGNOSIS — I44.7 LBBB (LEFT BUNDLE BRANCH BLOCK): ICD-10-CM

## 2018-09-14 PROCEDURE — 99214 OFFICE O/P EST MOD 30 MIN: CPT | Performed by: INTERNAL MEDICINE

## 2018-09-14 RX ORDER — OLMESARTAN MEDOXOMIL 20 MG/1
20 TABLET ORAL DAILY
Qty: 60 TABLET | Refills: 0 | Status: SHIPPED | OUTPATIENT
Start: 2018-09-14 | End: 2019-02-22 | Stop reason: SDUPTHER

## 2018-09-14 NOTE — PROGRESS NOTES
subjective     Chief Complaint   Patient presents with   • Chest Pain     S/P ER for CP / Poss MI   • Dizziness     History of Present Illness  Patient is 68 years old white female who complains of chest pain.  She went to the emergency room where she thinks that she may have had possible MI.  ER visit was reviewed at length.  Actually EKG was normal and cardiac enzymes were normal.  Patient does have significantly high blood pressure that could be causing the problem.  Pain was quite vague patient could not specify the location of the pain when she was in the emergency room it was described as aching without radiation it was mild and was not related to exertion.      Past Surgical History:   Procedure Laterality Date   • CARDIAC CATHETERIZATION     • CARDIAC CATHETERIZATION  2015   • CARDIOVASCULAR STRESS TEST  2015   •  SECTION      x 3    • ECHO - CONVERTED  2015   • THYROID SURGERY      age 14+ 15, for cysts     Family History   Problem Relation Age of Onset   • Lung cancer Mother    • Stroke Father      Past Medical History:   Diagnosis Date   • Aortic insufficiency    • Asthma    • Disease of thyroid gland    • Essential hypertension    • SOB (shortness of breath)      Patient Active Problem List   Diagnosis   • Essential hypertension   • Hypothyroidism   • Gastroesophageal reflux disease without esophagitis   • Hyperlipidemia   • LBBB (left bundle branch block)   • Valvular heart disease trace AI, Mod MR   • Hypokalemia   • Chest pain in adult       Social History   Substance Use Topics   • Smoking status: Never Smoker   • Smokeless tobacco: Never Used   • Alcohol use No       Allergies   Allergen Reactions   • Aspirin Anaphylaxis   • Eggs Or Egg-Derived Products    • Iron    • Losartan Hives, Itching and Swelling       Current Outpatient Prescriptions on File Prior to Visit   Medication Sig   • cetirizine (ZyrTEC) 10 MG tablet Take 10 mg by mouth daily.   • cholestyramine (QUESTRAN) 4 g  "packet Take 1 packet by mouth 2 (Two) Times a Day.   • ezetimibe (ZETIA) 10 MG tablet Take 1 tablet by mouth Daily.   • fluticasone-salmeterol (ADVAIR) 250-50 MCG/DOSE DISKUS Inhale 2 (two) times a day.   • hydrochlorothiazide (HYDRODIURIL) 25 MG tablet Take 0.5 tablets by mouth Daily.   • levothyroxine (SYNTHROID, LEVOTHROID) 50 MCG tablet Take 1 tablet by mouth Daily.   • nebivolol (BYSTOLIC) 2.5 MG tablet Take 1 tablet by mouth Daily. for blood pressure   • potassium chloride (K-DUR) 10 MEQ CR tablet Take 1 tablet by mouth Daily.   • [DISCONTINUED] terazosin (HYTRIN) 1 MG capsule Take 1 capsule by mouth Every Night.     No current facility-administered medications on file prior to visit.          The following portions of the patient's history were reviewed and updated as appropriate: allergies, current medications, past family history, past medical history, past social history, past surgical history and problem list.    Review of Systems   Constitution: Negative.   HENT: Negative.  Negative for congestion.    Eyes: Negative.    Cardiovascular: Positive for chest pain. Negative for cyanosis, dyspnea on exertion, irregular heartbeat, leg swelling, near-syncope, orthopnea, palpitations, paroxysmal nocturnal dyspnea and syncope.   Respiratory: Negative.  Negative for shortness of breath.    Hematologic/Lymphatic: Negative.    Musculoskeletal: Negative.    Gastrointestinal: Negative.    Neurological: Negative.  Negative for headaches.          Objective:     /100 (BP Location: Left arm, Patient Position: Sitting, Cuff Size: Adult)   Pulse 74   Ht 152.4 cm (60\")   Wt 66.2 kg (146 lb)   SpO2 98%   BMI 28.51 kg/m²   Physical Exam   Constitutional: She appears well-developed and well-nourished. No distress.   HENT:   Head: Normocephalic and atraumatic.   Mouth/Throat: Oropharynx is clear and moist. No oropharyngeal exudate.   Eyes: Pupils are equal, round, and reactive to light. Conjunctivae and EOM are " normal. No scleral icterus.   Neck: Normal range of motion. Neck supple. No JVD present. No tracheal deviation present. No thyromegaly present.   Cardiovascular: Normal rate, regular rhythm, normal heart sounds and intact distal pulses.  PMI is not displaced.  Exam reveals no gallop, no friction rub and no decreased pulses.    No murmur heard.  Pulses:       Carotid pulses are 3+ on the right side, and 3+ on the left side.       Radial pulses are 3+ on the right side, and 3+ on the left side.   Pulmonary/Chest: Effort normal and breath sounds normal. No respiratory distress. She has no wheezes. She has no rales. She exhibits no tenderness.   Abdominal: Soft. Bowel sounds are normal. She exhibits no distension, no abdominal bruit and no mass. There is no splenomegaly or hepatomegaly. There is no tenderness. There is no rebound and no guarding.   Musculoskeletal: Normal range of motion. She exhibits no edema, tenderness or deformity.   Lymphadenopathy:     She has no cervical adenopathy.   Neurological: She is alert. She has normal reflexes. No cranial nerve deficit. She exhibits normal muscle tone. Coordination normal.   Skin: Skin is warm and dry. No rash noted. She is not diaphoretic. No erythema.   Psychiatric: She has a normal mood and affect. Her behavior is normal. Judgment and thought content normal.         Lab Review  Lab Results   Component Value Date     09/05/2018    K 3.5 09/05/2018     09/05/2018    BUN 19 09/05/2018    CREATININE 0.90 09/05/2018    GLUCOSE 94 09/05/2018    CALCIUM 9.4 09/05/2018    ALT 17 09/05/2018    ALKPHOS 122 (H) 09/05/2018    LABIL2 1.5 05/19/2015     Lab Results   Component Value Date    CKTOTAL 67 11/29/2017     Lab Results   Component Value Date    WBC 10.05 09/05/2018    HGB 13.2 09/05/2018    HCT 40.6 09/05/2018     09/05/2018     Lab Results   Component Value Date    INR 0.89 01/17/2017    INR 0.96 07/23/2015     Lab Results   Component Value Date    MG  2.0 07/23/2015     Lab Results   Component Value Date    TSH 2.034 07/06/2018     Lab Results   Component Value Date    BNP 28.0 09/05/2018     Lab Results   Component Value Date    CHLPL 192 05/19/2015    CHOL 187 07/06/2018    TRIG 89 07/06/2018    HDL 72 07/06/2018    VLDL 17.8 07/06/2018    LDLHDL 1.35 07/06/2018     Lab Results   Component Value Date    LDL 97 07/06/2018     (H) 11/29/2017       Procedures       I personally viewed and interpreted the patient's LAB data         Assessment:     1. Essential hypertension    2. Mixed hyperlipidemia    3. LBBB (left bundle branch block)    4. Valvular heart disease trace AI, Mod MR    5. Chest pain in adult          Plan:      Patient had cardiac workup in January 2017 at that time stress test and echocardiogram were normal.  Patient has chronic left bundle branch block for that reason is very hard to see any changes.  She is comfortable today blood pressure is elevated.  Patient was advised to add Benicar 20 mg daily and continue Bystolic 2.5 daily along with hydrochlorothiazide.  She will discontinue Hytrin.    If patient had any further symptoms will arrange stress test again.  Follow-up in 2 weeks      Return in about 1 month (around 10/14/2018).

## 2018-09-24 ENCOUNTER — DOCUMENTATION (OUTPATIENT)
Dept: CARDIOLOGY | Facility: CLINIC | Age: 68
End: 2018-09-24

## 2018-09-24 NOTE — PROGRESS NOTES
Received approval for Olmesartan medoxomil . Effective date 12-30-17 thru 12-31-18 Per [employer].

## 2018-09-25 ENCOUNTER — TELEPHONE (OUTPATIENT)
Dept: CARDIOLOGY | Facility: CLINIC | Age: 68
End: 2018-09-25

## 2018-09-25 NOTE — TELEPHONE ENCOUNTER
Pt's pharmacy called stated they didn't recevie the RX for Chasity's Benicar. I gave them a verbal over the phone. Benicar 20 MG QD #60 with 0 RFS. They expressed understanding.

## 2018-10-29 ENCOUNTER — OFFICE VISIT (OUTPATIENT)
Dept: CARDIOLOGY | Facility: CLINIC | Age: 68
End: 2018-10-29

## 2018-10-29 VITALS
DIASTOLIC BLOOD PRESSURE: 94 MMHG | BODY MASS INDEX: 28.86 KG/M2 | HEIGHT: 60 IN | HEART RATE: 74 BPM | WEIGHT: 147 LBS | SYSTOLIC BLOOD PRESSURE: 166 MMHG | OXYGEN SATURATION: 98 %

## 2018-10-29 DIAGNOSIS — E03.4 HYPOTHYROIDISM DUE TO ACQUIRED ATROPHY OF THYROID: ICD-10-CM

## 2018-10-29 DIAGNOSIS — Z78.9 STATIN INTOLERANCE: ICD-10-CM

## 2018-10-29 DIAGNOSIS — E78.2 MIXED HYPERLIPIDEMIA: ICD-10-CM

## 2018-10-29 DIAGNOSIS — I44.7 LBBB (LEFT BUNDLE BRANCH BLOCK): ICD-10-CM

## 2018-10-29 DIAGNOSIS — I10 ESSENTIAL HYPERTENSION: Primary | ICD-10-CM

## 2018-10-29 PROCEDURE — 99214 OFFICE O/P EST MOD 30 MIN: CPT | Performed by: INTERNAL MEDICINE

## 2018-10-29 RX ORDER — NEBIVOLOL 5 MG/1
5 TABLET ORAL DAILY
Qty: 90 TABLET | Refills: 2 | Status: SHIPPED | OUTPATIENT
Start: 2018-10-29 | End: 2019-03-06 | Stop reason: SDUPTHER

## 2018-10-29 RX ORDER — EZETIMIBE 10 MG/1
10 TABLET ORAL DAILY
Qty: 90 TABLET | Refills: 3 | Status: SHIPPED | OUTPATIENT
Start: 2018-10-29 | End: 2019-11-14 | Stop reason: SDUPTHER

## 2018-10-29 NOTE — PROGRESS NOTES
subjective     Chief Complaint   Patient presents with   • Hyperlipidemia   • Hypertension   • Follow-up     History of Present Illness    Patient is 68 years old white female who is here for follow-up of multiple chronic medical problems.  Patient has essential hypertension.  She states her blood pressure has been running high.  She is taking her medications regularly.  Patient claims to be intolerant to a lot of medications.  She was not willing to take any ACE inhibitor or ARB.  She also does not like clonidine but now she is taking low-dose Bystolic.    She also has hypothyroidism on Synthroid replacement therapy.    Hyperlipidemia is being treated with Zetia 10 mg daily and questron.  She is not taking any statin therapy now she had quit that about a year ago.  She claims to be statin intolerant.  No rhabdomyolysis or liver function abnormalities    Patient also has valvular heart disease with the moderate mitral regurgitation and trivial aortic insufficiency.  Clinically she has no evidence of heart failure.    She also has chronic left bundle branch block.  She is doing well with no chest pain palpitations or shortness of breath at this time..  Past Surgical History:   Procedure Laterality Date   • CARDIAC CATHETERIZATION     • CARDIAC CATHETERIZATION  2015   • CARDIOVASCULAR STRESS TEST  2015   •  SECTION      x 3    • ECHO - CONVERTED  2015   • THYROID SURGERY      age 14+ 15, for cysts     Family History   Problem Relation Age of Onset   • Lung cancer Mother    • Stroke Father      Past Medical History:   Diagnosis Date   • Aortic insufficiency    • Asthma    • Disease of thyroid gland    • Essential hypertension    • SOB (shortness of breath)      Patient Active Problem List   Diagnosis   • Essential hypertension   • Hypothyroidism   • Gastroesophageal reflux disease without esophagitis   • Hyperlipidemia   • LBBB (left bundle branch block)   • Valvular heart disease trace AI, Mod MR    • Hypokalemia   • Chest pain in adult   • Statin intolerance and (no rhabdomyolysis ,no liver function abnormalities)       Social History   Substance Use Topics   • Smoking status: Never Smoker   • Smokeless tobacco: Never Used   • Alcohol use No       Allergies   Allergen Reactions   • Aspirin Anaphylaxis   • Clonidine Unknown (See Comments)   • Eggs Or Egg-Derived Products    • Iron    • Losartan Hives, Itching and Swelling   • Ace Inhibitors Rash   • Albuterol Rash   • Lisinopril Rash       Current Outpatient Prescriptions on File Prior to Visit   Medication Sig   • cetirizine (ZyrTEC) 10 MG tablet Take 10 mg by mouth daily.   • cholestyramine (QUESTRAN) 4 g packet Take 1 packet by mouth 2 (Two) Times a Day.   • fluticasone-salmeterol (ADVAIR) 250-50 MCG/DOSE DISKUS Inhale 2 (two) times a day.   • hydrochlorothiazide (HYDRODIURIL) 25 MG tablet Take 0.5 tablets by mouth Daily.   • levothyroxine (SYNTHROID, LEVOTHROID) 50 MCG tablet Take 1 tablet by mouth Daily.   • olmesartan (BENICAR) 20 MG tablet Take 1 tablet by mouth Daily.   • potassium chloride (K-DUR) 10 MEQ CR tablet Take 1 tablet by mouth Daily.     No current facility-administered medications on file prior to visit.          The following portions of the patient's history were reviewed and updated as appropriate: allergies, current medications, past family history, past medical history, past social history, past surgical history and problem list.    Review of Systems   Constitution: Negative.   HENT: Negative.  Negative for congestion.    Eyes: Negative.    Cardiovascular: Negative.  Negative for chest pain, cyanosis, dyspnea on exertion, irregular heartbeat, leg swelling, near-syncope, orthopnea, palpitations, paroxysmal nocturnal dyspnea and syncope.   Respiratory: Negative.  Negative for shortness of breath.    Hematologic/Lymphatic: Negative.    Musculoskeletal: Negative.    Gastrointestinal: Negative.    Neurological: Negative.  Negative for  "headaches.          Objective:     /94 (BP Location: Left arm, Patient Position: Sitting)   Pulse 74   Ht 152.4 cm (60\")   Wt 66.7 kg (147 lb)   SpO2 98%   BMI 28.71 kg/m²   Physical Exam   Constitutional: She appears well-developed and well-nourished. No distress.   HENT:   Head: Normocephalic and atraumatic.   Mouth/Throat: Oropharynx is clear and moist. No oropharyngeal exudate.   Eyes: Pupils are equal, round, and reactive to light. Conjunctivae and EOM are normal. No scleral icterus.   Neck: Normal range of motion. Neck supple. No JVD present. No tracheal deviation present. No thyromegaly present.   Cardiovascular: Normal rate, regular rhythm, normal heart sounds and intact distal pulses.  PMI is not displaced.  Exam reveals no gallop, no friction rub and no decreased pulses.    No murmur heard.  Pulses:       Carotid pulses are 3+ on the right side, and 3+ on the left side.       Radial pulses are 3+ on the right side, and 3+ on the left side.   Pulmonary/Chest: Effort normal and breath sounds normal. No respiratory distress. She has no wheezes. She has no rales. She exhibits no tenderness.   Abdominal: Soft. Bowel sounds are normal. She exhibits no distension, no abdominal bruit and no mass. There is no splenomegaly or hepatomegaly. There is no tenderness. There is no rebound and no guarding.   Musculoskeletal: Normal range of motion. She exhibits no edema, tenderness or deformity.   Lymphadenopathy:     She has no cervical adenopathy.   Neurological: She is alert. She has normal reflexes. No cranial nerve deficit. She exhibits normal muscle tone. Coordination normal.   Skin: Skin is warm and dry. No rash noted. She is not diaphoretic. No erythema.   Psychiatric: She has a normal mood and affect. Her behavior is normal. Judgment and thought content normal.         Lab Review  Lab Results   Component Value Date     09/05/2018    K 3.5 09/05/2018     09/05/2018    BUN 19 09/05/2018    " CREATININE 0.90 09/05/2018    GLUCOSE 94 09/05/2018    CALCIUM 9.4 09/05/2018    ALT 17 09/05/2018    ALKPHOS 122 (H) 09/05/2018    LABIL2 1.5 05/19/2015     Lab Results   Component Value Date    CKTOTAL 67 11/29/2017     Lab Results   Component Value Date    WBC 10.05 09/05/2018    HGB 13.2 09/05/2018    HCT 40.6 09/05/2018     09/05/2018     Lab Results   Component Value Date    INR 0.89 01/17/2017    INR 0.96 07/23/2015     Lab Results   Component Value Date    MG 2.0 07/23/2015     Lab Results   Component Value Date    TSH 2.034 07/06/2018     Lab Results   Component Value Date    BNP 28.0 09/05/2018     Lab Results   Component Value Date    CHLPL 192 05/19/2015    CHOL 187 07/06/2018    TRIG 89 07/06/2018    HDL 72 07/06/2018    VLDL 17.8 07/06/2018    LDLHDL 1.35 07/06/2018     Lab Results   Component Value Date    LDL 97 07/06/2018     (H) 11/29/2017       Procedures       I personally viewed and interpreted the patient's LAB data         Assessment:     1. Essential hypertension    2. LBBB (left bundle branch block)    3. Mixed hyperlipidemia    4. Hypothyroidism due to acquired atrophy of thyroid    5. Statin intolerance and (no rhabdomyolysis ,no liver function abnormalities)          Plan:      Blood pressure is elevated.  Patient is intolerant to a lot of antihypertensive medications.  She was advised to increase Bystolic to 5 mg daily.  She will check blood pressure and increase further if needed.    Patient has hyperlipidemia but claims to be intolerant to statin therapy.  She is taking Zetia and questron medications were continued for now.    Thyroid dose was unchanged.  Lab work scheduled for next visit.  Medication will be adjusted depending on the lab finding.  Healthy lifestyle emphasized        Return in about 3 months (around 1/29/2019).

## 2018-10-30 PROBLEM — Z78.9 STATIN INTOLERANCE: Status: ACTIVE | Noted: 2018-10-30

## 2019-02-25 RX ORDER — OLMESARTAN MEDOXOMIL 20 MG/1
TABLET ORAL
Qty: 30 TABLET | Refills: 0 | Status: SHIPPED | OUTPATIENT
Start: 2019-02-25 | End: 2019-03-26 | Stop reason: SDUPTHER

## 2019-02-28 ENCOUNTER — LAB (OUTPATIENT)
Dept: LAB | Facility: HOSPITAL | Age: 69
End: 2019-02-28

## 2019-02-28 DIAGNOSIS — E78.2 MIXED HYPERLIPIDEMIA: ICD-10-CM

## 2019-02-28 DIAGNOSIS — I10 ESSENTIAL HYPERTENSION: Primary | ICD-10-CM

## 2019-02-28 DIAGNOSIS — I10 ESSENTIAL HYPERTENSION: ICD-10-CM

## 2019-02-28 DIAGNOSIS — E03.4 HYPOTHYROIDISM DUE TO ACQUIRED ATROPHY OF THYROID: ICD-10-CM

## 2019-02-28 LAB
ALBUMIN SERPL-MCNC: 4.3 G/DL (ref 3.4–4.8)
ALBUMIN/GLOB SERPL: 1.5 G/DL (ref 1.5–2.5)
ALP SERPL-CCNC: 106 U/L (ref 35–104)
ALT SERPL W P-5'-P-CCNC: 14 U/L (ref 10–36)
ANION GAP SERPL CALCULATED.3IONS-SCNC: 3.3 MMOL/L (ref 3.6–11.2)
AST SERPL-CCNC: 23 U/L (ref 10–30)
BASOPHILS # BLD AUTO: 0.04 10*3/MM3 (ref 0–0.3)
BASOPHILS NFR BLD AUTO: 0.6 % (ref 0–2)
BILIRUB SERPL-MCNC: 0.4 MG/DL (ref 0.2–1.8)
BUN BLD-MCNC: 15 MG/DL (ref 7–21)
BUN/CREAT SERPL: 17.4 (ref 7–25)
CALCIUM SPEC-SCNC: 9.1 MG/DL (ref 7.7–10)
CHLORIDE SERPL-SCNC: 108 MMOL/L (ref 99–112)
CHOLEST SERPL-MCNC: 175 MG/DL (ref 0–200)
CK SERPL-CCNC: 96 U/L (ref 24–173)
CO2 SERPL-SCNC: 26.7 MMOL/L (ref 24.3–31.9)
CREAT BLD-MCNC: 0.86 MG/DL (ref 0.43–1.29)
DEPRECATED RDW RBC AUTO: 52.6 FL (ref 37–54)
EOSINOPHIL # BLD AUTO: 0.17 10*3/MM3 (ref 0–0.7)
EOSINOPHIL NFR BLD AUTO: 2.4 % (ref 0–7)
ERYTHROCYTE [DISTWIDTH] IN BLOOD BY AUTOMATED COUNT: 16.5 % (ref 11.5–14.5)
GFR SERPL CREATININE-BSD FRML MDRD: 66 ML/MIN/1.73
GLOBULIN UR ELPH-MCNC: 2.9 GM/DL
GLUCOSE BLD-MCNC: 82 MG/DL (ref 70–110)
HCT VFR BLD AUTO: 39.7 % (ref 37–47)
HDLC SERPL-MCNC: 72 MG/DL (ref 60–100)
HGB BLD-MCNC: 12.4 G/DL (ref 12–16)
IMM GRANULOCYTES # BLD AUTO: 0.01 10*3/MM3 (ref 0–0.03)
IMM GRANULOCYTES NFR BLD AUTO: 0.1 % (ref 0–0.5)
LARGE PLATELETS: NORMAL
LDLC SERPL CALC-MCNC: 84 MG/DL (ref 0–100)
LDLC/HDLC SERPL: 1.16 {RATIO}
LYMPHOCYTES # BLD AUTO: 2.8 10*3/MM3 (ref 1–3)
LYMPHOCYTES NFR BLD AUTO: 40.1 % (ref 16–46)
MCH RBC QN AUTO: 27.2 PG (ref 27–33)
MCHC RBC AUTO-ENTMCNC: 31.2 G/DL (ref 33–37)
MCV RBC AUTO: 87.1 FL (ref 80–94)
MONOCYTES # BLD AUTO: 0.65 10*3/MM3 (ref 0.1–0.9)
MONOCYTES NFR BLD AUTO: 9.3 % (ref 0–12)
NEUTROPHILS # BLD AUTO: 3.32 10*3/MM3 (ref 1.4–6.5)
NEUTROPHILS NFR BLD AUTO: 47.5 % (ref 40–75)
OSMOLALITY SERPL CALC.SUM OF ELEC: 275.6 MOSM/KG (ref 273–305)
PLATELET # BLD AUTO: 150 10*3/MM3 (ref 130–400)
PMV BLD AUTO: ABNORMAL FL (ref 6–10)
POTASSIUM BLD-SCNC: 4.5 MMOL/L (ref 3.5–5.3)
PROT SERPL-MCNC: 7.2 G/DL (ref 6–8)
RBC # BLD AUTO: 4.56 10*6/MM3 (ref 4.2–5.4)
RBC MORPH BLD: NORMAL
SODIUM BLD-SCNC: 138 MMOL/L (ref 135–153)
T4 FREE SERPL-MCNC: 1.28 NG/DL (ref 0.89–1.76)
TRIGL SERPL-MCNC: 97 MG/DL (ref 0–150)
TSH SERPL DL<=0.05 MIU/L-ACNC: 2.52 MIU/ML (ref 0.55–4.78)
VLDLC SERPL-MCNC: 19.4 MG/DL
WBC NRBC COR # BLD: 6.99 10*3/MM3 (ref 4.5–12.5)

## 2019-02-28 PROCEDURE — 85025 COMPLETE CBC W/AUTO DIFF WBC: CPT

## 2019-02-28 PROCEDURE — 36415 COLL VENOUS BLD VENIPUNCTURE: CPT

## 2019-02-28 PROCEDURE — 84443 ASSAY THYROID STIM HORMONE: CPT

## 2019-02-28 PROCEDURE — 80053 COMPREHEN METABOLIC PANEL: CPT

## 2019-02-28 PROCEDURE — 84439 ASSAY OF FREE THYROXINE: CPT

## 2019-02-28 PROCEDURE — 82550 ASSAY OF CK (CPK): CPT

## 2019-02-28 PROCEDURE — 80061 LIPID PANEL: CPT

## 2019-02-28 PROCEDURE — 85007 BL SMEAR W/DIFF WBC COUNT: CPT

## 2019-03-06 ENCOUNTER — OFFICE VISIT (OUTPATIENT)
Dept: CARDIOLOGY | Facility: CLINIC | Age: 69
End: 2019-03-06

## 2019-03-06 DIAGNOSIS — I10 ESSENTIAL HYPERTENSION: Primary | ICD-10-CM

## 2019-03-06 DIAGNOSIS — E87.6 HYPOKALEMIA: ICD-10-CM

## 2019-03-06 DIAGNOSIS — I38 VALVULAR HEART DISEASE: ICD-10-CM

## 2019-03-06 DIAGNOSIS — E78.2 MIXED HYPERLIPIDEMIA: ICD-10-CM

## 2019-03-06 DIAGNOSIS — Z78.9 STATIN INTOLERANCE: ICD-10-CM

## 2019-03-06 DIAGNOSIS — I44.7 LBBB (LEFT BUNDLE BRANCH BLOCK): ICD-10-CM

## 2019-03-06 DIAGNOSIS — E03.4 HYPOTHYROIDISM DUE TO ACQUIRED ATROPHY OF THYROID: ICD-10-CM

## 2019-03-06 PROCEDURE — 99214 OFFICE O/P EST MOD 30 MIN: CPT | Performed by: INTERNAL MEDICINE

## 2019-03-06 RX ORDER — NEBIVOLOL 10 MG/1
10 TABLET ORAL DAILY
Qty: 90 TABLET | Refills: 1 | Status: SHIPPED | OUTPATIENT
Start: 2019-03-06 | End: 2019-09-11

## 2019-03-06 RX ORDER — NEBIVOLOL 5 MG/1
10 TABLET ORAL DAILY
Qty: 180 TABLET | Refills: 1 | Status: SHIPPED | OUTPATIENT
Start: 2019-03-06 | End: 2019-03-06 | Stop reason: SDUPTHER

## 2019-03-06 NOTE — PROGRESS NOTES
subjective     Chief Complaint   Patient presents with   • Hypertension   • Hyperlipidemia   • Follow-up     History of Present Illness  Patient is 68 years old white female who is here for follow-up of multiple chronic medical problems.  Patient has essential hypertension.  She is taking hydrochlorothiazide 12.5 daily along with Bystolic 5 mg daily and Benicar 20 mg daily.  Blood pressure is running high.  Will increase  to bystolic 10 mg daily.    Patient also has hyperlipidemia.  She is statin intolerant.  She is taking questron, Zetia and is trying to follow diet.  She had hypokalemia and has been taking K Dur 10 mEq daily    Hypothyroidism is being treated with Synthroid 50 mcg daily.  Patient is tolerating medications very well and seems to be doing well.      Past Surgical History:   Procedure Laterality Date   • CARDIAC CATHETERIZATION     • CARDIAC CATHETERIZATION  2015   • CARDIOVASCULAR STRESS TEST  2015   •  SECTION      x 3    • ECHO - CONVERTED  2015   • THYROID SURGERY      age 14+ 15, for cysts     Family History   Problem Relation Age of Onset   • Lung cancer Mother    • Stroke Father      Past Medical History:   Diagnosis Date   • Aortic insufficiency    • Asthma    • Disease of thyroid gland    • Essential hypertension    • SOB (shortness of breath)      Patient Active Problem List   Diagnosis   • Essential hypertension   • Hypothyroidism   • Gastroesophageal reflux disease without esophagitis   • Hyperlipidemia   • LBBB (left bundle branch block)   • Valvular heart disease trace AI, Mod MR   • Hypokalemia   • Chest pain in adult   • Statin intolerance and (no rhabdomyolysis ,no liver function abnormalities)       Social History     Tobacco Use   • Smoking status: Never Smoker   • Smokeless tobacco: Never Used   Substance Use Topics   • Alcohol use: No   • Drug use: No       Allergies   Allergen Reactions   • Aspirin Anaphylaxis   • Clonidine Unknown (See Comments)   • Eggs Or  "Egg-Derived Products    • Iron    • Losartan Hives, Itching and Swelling   • Ace Inhibitors Rash   • Albuterol Rash   • Lisinopril Rash       Current Outpatient Medications on File Prior to Visit   Medication Sig   • cetirizine (ZyrTEC) 10 MG tablet Take 10 mg by mouth daily.   • cholestyramine (QUESTRAN) 4 g packet Take 1 packet by mouth 2 (Two) Times a Day.   • ezetimibe (ZETIA) 10 MG tablet Take 1 tablet by mouth Daily.   • fluticasone-salmeterol (ADVAIR) 250-50 MCG/DOSE DISKUS Inhale 2 (two) times a day.   • hydrochlorothiazide (HYDRODIURIL) 25 MG tablet Take 0.5 tablets by mouth Daily.   • levothyroxine (SYNTHROID, LEVOTHROID) 50 MCG tablet Take 1 tablet by mouth Daily.   • olmesartan (BENICAR) 20 MG tablet TAKE 1 TABLET BY MOUTH ONCE DAILY   • potassium chloride (K-DUR) 10 MEQ CR tablet Take 1 tablet by mouth Daily.     No current facility-administered medications on file prior to visit.          The following portions of the patient's history were reviewed and updated as appropriate: allergies, current medications, past family history, past medical history, past social history, past surgical history and problem list.    Review of Systems   Constitution: Negative.   HENT: Negative.  Negative for congestion.    Eyes: Negative.    Cardiovascular: Negative.  Negative for chest pain, cyanosis, dyspnea on exertion, irregular heartbeat, leg swelling, near-syncope, orthopnea, palpitations, paroxysmal nocturnal dyspnea and syncope.   Respiratory: Negative.  Negative for shortness of breath.    Hematologic/Lymphatic: Negative.    Musculoskeletal: Negative.    Gastrointestinal: Negative.    Neurological: Negative.  Negative for headaches.          Objective:     /80   Pulse 67   Ht 152.4 cm (60\")   Wt 67.6 kg (149 lb)   SpO2 98%   BMI 29.10 kg/m²   Physical Exam   Constitutional: She appears well-developed and well-nourished. No distress.   HENT:   Head: Normocephalic and atraumatic.   Mouth/Throat: " Oropharynx is clear and moist. No oropharyngeal exudate.   Eyes: Conjunctivae and EOM are normal. Pupils are equal, round, and reactive to light. No scleral icterus.   Neck: Normal range of motion. Neck supple. No JVD present. No tracheal deviation present. No thyromegaly present.   Cardiovascular: Normal rate, regular rhythm, normal heart sounds and intact distal pulses. PMI is not displaced. Exam reveals no gallop, no friction rub and no decreased pulses.   No murmur heard.  Pulses:       Carotid pulses are 3+ on the right side, and 3+ on the left side.       Radial pulses are 3+ on the right side, and 3+ on the left side.   Pulmonary/Chest: Effort normal and breath sounds normal. No respiratory distress. She has no wheezes. She has no rales. She exhibits no tenderness.   Abdominal: Soft. Bowel sounds are normal. She exhibits no distension, no abdominal bruit and no mass. There is no splenomegaly or hepatomegaly. There is no tenderness. There is no rebound and no guarding.   Musculoskeletal: Normal range of motion. She exhibits no edema, tenderness or deformity.   Lymphadenopathy:     She has no cervical adenopathy.   Neurological: She is alert. She has normal reflexes. No cranial nerve deficit. She exhibits normal muscle tone. Coordination normal.   Skin: Skin is warm and dry. No rash noted. She is not diaphoretic. No erythema.   Psychiatric: She has a normal mood and affect. Her behavior is normal. Judgment and thought content normal.         Lab Review  Lab Results   Component Value Date     02/28/2019    K 4.5 02/28/2019     02/28/2019    BUN 15 02/28/2019    CREATININE 0.86 02/28/2019    GLUCOSE 82 02/28/2019    CALCIUM 9.1 02/28/2019    ALT 14 02/28/2019    ALKPHOS 106 (H) 02/28/2019    LABIL2 1.5 05/19/2015     Lab Results   Component Value Date    CKTOTAL 96 02/28/2019     Lab Results   Component Value Date    WBC 6.99 02/28/2019    HGB 12.4 02/28/2019    HCT 39.7 02/28/2019      02/28/2019     Lab Results   Component Value Date    INR 0.89 01/17/2017    INR 0.96 07/23/2015     Lab Results   Component Value Date    MG 2.0 07/23/2015     Lab Results   Component Value Date    TSH 2.525 02/28/2019     Lab Results   Component Value Date    BNP 28.0 09/05/2018     Lab Results   Component Value Date    CHLPL 192 05/19/2015    CHOL 175 02/28/2019    TRIG 97 02/28/2019    HDL 72 02/28/2019    VLDL 19.4 02/28/2019    LDLHDL 1.16 02/28/2019     Lab Results   Component Value Date    LDL 84 02/28/2019    LDL 97 07/06/2018       Procedures       I personally viewed and interpreted the patient's LAB data         Assessment:     1. Essential hypertension    2. Mixed hyperlipidemia    3. LBBB (left bundle branch block)    4. Valvular heart disease trace AI, Mod MR    5. Hypothyroidism due to acquired atrophy of thyroid    6. Hypokalemia    7. Statin intolerance and (no rhabdomyolysis ,no liver function abnormalities)          Plan:   Lab work reviewed.  CBC CMP lipid panel is normal.  Patient was advised to continue current medications.    Blood pressure is elevated patient was advised to increase bystolic 10 mg daily.  She will check her blood pressure closely when asked for further instructions.  Salt restriction healthy lifestyle was emphasized.  Follow-up scheduled           No Follow-up on file.

## 2019-03-08 VITALS
DIASTOLIC BLOOD PRESSURE: 80 MMHG | SYSTOLIC BLOOD PRESSURE: 164 MMHG | BODY MASS INDEX: 29.25 KG/M2 | OXYGEN SATURATION: 98 % | WEIGHT: 149 LBS | HEIGHT: 60 IN | HEART RATE: 67 BPM

## 2019-03-26 RX ORDER — OLMESARTAN MEDOXOMIL 20 MG/1
TABLET ORAL
Qty: 30 TABLET | Refills: 0 | Status: SHIPPED | OUTPATIENT
Start: 2019-03-26 | End: 2019-04-26 | Stop reason: SDUPTHER

## 2019-04-26 RX ORDER — OLMESARTAN MEDOXOMIL 20 MG/1
TABLET ORAL
Qty: 30 TABLET | Refills: 0 | Status: SHIPPED | OUTPATIENT
Start: 2019-04-26 | End: 2019-05-28 | Stop reason: SDUPTHER

## 2019-05-28 RX ORDER — OLMESARTAN MEDOXOMIL 20 MG/1
TABLET ORAL
Qty: 30 TABLET | Refills: 0 | Status: SHIPPED | OUTPATIENT
Start: 2019-05-28 | End: 2019-06-04 | Stop reason: SDUPTHER

## 2019-06-04 ENCOUNTER — OFFICE VISIT (OUTPATIENT)
Dept: CARDIOLOGY | Facility: CLINIC | Age: 69
End: 2019-06-04

## 2019-06-04 VITALS
BODY MASS INDEX: 28.66 KG/M2 | DIASTOLIC BLOOD PRESSURE: 68 MMHG | HEART RATE: 56 BPM | HEIGHT: 60 IN | OXYGEN SATURATION: 98 % | SYSTOLIC BLOOD PRESSURE: 162 MMHG | WEIGHT: 146 LBS

## 2019-06-04 DIAGNOSIS — R07.9 CHEST PAIN IN ADULT: ICD-10-CM

## 2019-06-04 DIAGNOSIS — I10 ESSENTIAL HYPERTENSION: Primary | ICD-10-CM

## 2019-06-04 DIAGNOSIS — E03.4 HYPOTHYROIDISM DUE TO ACQUIRED ATROPHY OF THYROID: ICD-10-CM

## 2019-06-04 DIAGNOSIS — R53.83 OTHER FATIGUE: ICD-10-CM

## 2019-06-04 DIAGNOSIS — E78.2 MIXED HYPERLIPIDEMIA: ICD-10-CM

## 2019-06-04 DIAGNOSIS — I44.7 LBBB (LEFT BUNDLE BRANCH BLOCK): ICD-10-CM

## 2019-06-04 PROBLEM — I25.10 CORONARY ARTERY DISEASE INVOLVING NATIVE CORONARY ARTERY OF NATIVE HEART WITHOUT ANGINA PECTORIS: Status: ACTIVE | Noted: 2019-06-04

## 2019-06-04 PROCEDURE — 93000 ELECTROCARDIOGRAM COMPLETE: CPT | Performed by: INTERNAL MEDICINE

## 2019-06-04 PROCEDURE — 99214 OFFICE O/P EST MOD 30 MIN: CPT | Performed by: INTERNAL MEDICINE

## 2019-06-04 RX ORDER — OLMESARTAN MEDOXOMIL 20 MG/1
40 TABLET ORAL DAILY
Qty: 180 TABLET | Refills: 0 | Status: SHIPPED | OUTPATIENT
Start: 2019-06-04 | End: 2019-09-04 | Stop reason: SDUPTHER

## 2019-06-04 NOTE — PROGRESS NOTES
subjective     Chief Complaint   Patient presents with   • Hypertension   • Follow-up     History of Present Illness    Patient is 69 years old white female who is here for cardiology follow-up.  Blood pressure has been running quite high according to her.  She is taking her medications regularly      Patient also has hyperlipidemia but she is statin intolerant she is taking questran and Zetia.  Patient has hypothyroidism on Synthroid replacement therapy.  Lab work will be checked next visit.    Patient is taking Benicar 20 mg daily along with bystolic 10 mg daily and hydrochlorothiazide 12.5 daily for hypertension however blood pressure is running high.        Past Surgical History:   Procedure Laterality Date   • CARDIAC CATHETERIZATION     • CARDIAC CATHETERIZATION  2015   • CARDIOVASCULAR STRESS TEST  2015   •  SECTION      x 3    • ECHO - CONVERTED  2015   • THYROID SURGERY      age 14+ 15, for cysts     Family History   Problem Relation Age of Onset   • Lung cancer Mother    • Stroke Father      Past Medical History:   Diagnosis Date   • Aortic insufficiency    • Asthma    • Disease of thyroid gland    • Essential hypertension    • SOB (shortness of breath)      Patient Active Problem List   Diagnosis   • Essential hypertension   • Hypothyroidism   • Gastroesophageal reflux disease without esophagitis   • Hyperlipidemia   • LBBB (left bundle branch block)   • Valvular heart disease trace AI, Mod MR   • Hypokalemia   • Chest pain in adult   • Statin intolerance and (no rhabdomyolysis ,no liver function abnormalities)       Social History     Tobacco Use   • Smoking status: Never Smoker   • Smokeless tobacco: Never Used   Substance Use Topics   • Alcohol use: No   • Drug use: No       Allergies   Allergen Reactions   • Aspirin Anaphylaxis   • Clonidine Unknown (See Comments)   • Eggs Or Egg-Derived Products    • Iron    • Losartan Hives, Itching and Swelling   • Ace Inhibitors Rash   •  "Albuterol Rash   • Lisinopril Rash       Current Outpatient Medications on File Prior to Visit   Medication Sig   • cetirizine (ZyrTEC) 10 MG tablet Take 10 mg by mouth daily.   • cholestyramine (QUESTRAN) 4 g packet Take 1 packet by mouth 2 (Two) Times a Day.   • ezetimibe (ZETIA) 10 MG tablet Take 1 tablet by mouth Daily.   • fluticasone-salmeterol (ADVAIR) 250-50 MCG/DOSE DISKUS Inhale 2 (two) times a day.   • hydrochlorothiazide (HYDRODIURIL) 25 MG tablet Take 0.5 tablets by mouth Daily.   • levothyroxine (SYNTHROID, LEVOTHROID) 50 MCG tablet Take 1 tablet by mouth Daily.   • nebivolol (BYSTOLIC) 10 MG tablet Take 1 tablet by mouth Daily.   • potassium chloride (K-DUR) 10 MEQ CR tablet Take 1 tablet by mouth Daily.   • [DISCONTINUED] olmesartan (BENICAR) 20 MG tablet TAKE 1 TABLET BY MOUTH ONCE DAILY     No current facility-administered medications on file prior to visit.          The following portions of the patient's history were reviewed and updated as appropriate: allergies, current medications, past family history, past medical history, past social history, past surgical history and problem list.    Review of Systems   Constitution: Negative.   HENT: Negative.  Negative for congestion.    Eyes: Negative.    Cardiovascular: Negative.  Negative for chest pain, cyanosis, dyspnea on exertion, irregular heartbeat, leg swelling, near-syncope, orthopnea, palpitations, paroxysmal nocturnal dyspnea and syncope.   Respiratory: Negative.  Negative for shortness of breath.    Hematologic/Lymphatic: Negative.    Musculoskeletal: Negative.    Gastrointestinal: Negative.    Neurological: Negative.  Negative for headaches.          Objective:     /68 (BP Location: Left arm, Patient Position: Sitting)   Pulse 56   Ht 152.4 cm (60\")   Wt 66.2 kg (146 lb)   SpO2 98%   BMI 28.51 kg/m²   Physical Exam   Constitutional: She appears well-developed and well-nourished. No distress.   HENT:   Head: Normocephalic and " atraumatic.   Mouth/Throat: Oropharynx is clear and moist. No oropharyngeal exudate.   Eyes: Conjunctivae and EOM are normal. Pupils are equal, round, and reactive to light. No scleral icterus.   Neck: Normal range of motion. Neck supple. No JVD present. No tracheal deviation present. No thyromegaly present.   Cardiovascular: Normal rate, regular rhythm, normal heart sounds and intact distal pulses. PMI is not displaced. Exam reveals no gallop, no friction rub and no decreased pulses.   No murmur heard.  Pulses:       Carotid pulses are 3+ on the right side, and 3+ on the left side.       Radial pulses are 3+ on the right side, and 3+ on the left side.   Pulmonary/Chest: Effort normal and breath sounds normal. No respiratory distress. She has no wheezes. She has no rales. She exhibits no tenderness.   Abdominal: Soft. Bowel sounds are normal. She exhibits no distension, no abdominal bruit and no mass. There is no splenomegaly or hepatomegaly. There is no tenderness. There is no rebound and no guarding.   Musculoskeletal: Normal range of motion. She exhibits no edema, tenderness or deformity.   Lymphadenopathy:     She has no cervical adenopathy.   Neurological: She is alert. She has normal reflexes. No cranial nerve deficit. She exhibits normal muscle tone. Coordination normal.   Skin: Skin is warm and dry. No rash noted. She is not diaphoretic. No erythema.   Psychiatric: She has a normal mood and affect. Her behavior is normal. Judgment and thought content normal.         Lab Review  Lab Results   Component Value Date     02/28/2019    K 4.5 02/28/2019     02/28/2019    BUN 15 02/28/2019    CREATININE 0.86 02/28/2019    GLUCOSE 82 02/28/2019    CALCIUM 9.1 02/28/2019    ALT 14 02/28/2019    ALKPHOS 106 (H) 02/28/2019    LABIL2 1.5 05/19/2015     Lab Results   Component Value Date    CKTOTAL 96 02/28/2019     Lab Results   Component Value Date    WBC 6.99 02/28/2019    HGB 12.4 02/28/2019    HCT 39.7  02/28/2019     02/28/2019     Lab Results   Component Value Date    INR 0.89 01/17/2017    INR 0.96 07/23/2015     Lab Results   Component Value Date    MG 2.0 07/23/2015     Lab Results   Component Value Date    TSH 2.525 02/28/2019     Lab Results   Component Value Date    BNP 28.0 09/05/2018     Lab Results   Component Value Date    CHLPL 192 05/19/2015    CHOL 175 02/28/2019    TRIG 97 02/28/2019    HDL 72 02/28/2019    VLDL 19.4 02/28/2019    LDLHDL 1.16 02/28/2019     Lab Results   Component Value Date    LDL 84 02/28/2019    LDL 97 07/06/2018         ECG 12 Lead  Date/Time: 6/4/2019 4:07 PM  Performed by: John Banda MD  Authorized by: John Banda MD   Comparison: compared with previous ECG from 9/5/2018  Comparison to previous ECG: PACs are new  Rhythm: sinus rhythm and sinus bradycardia  Ectopy: atrial premature contractions  Rate: bradycardic  BPM: 59  Conduction: left anterior fascicular block and non-specific intraventricular conduction delay  QRS axis: left  Other findings: non-specific ST-T wave changes    Clinical impression: myocardial infarction  Comments: Old anterior wall myocardial infarction cannot be excluded               I personally viewed and interpreted the patient's LAB data         Assessment:     1. Essential hypertension    2. Mixed hyperlipidemia    3. Chest pain in adult    4. Other fatigue    5. Hypothyroidism due to acquired atrophy of thyroid    6. LBBB (left bundle branch block)          Plan:     Blood pressure is elevated she was advised to increase Benicar 40 mg daily.  She will continue bystolic and hydrochlorothiazide.  She will continue current dose of Synthroid lab work will be checked next visit.  EKG does not show any acute changes occasional PAC is noted.  Patient has hyperlipidemia on questron and Zetia.  Lab work will be checked next visit and medications adjusted.  Healthy lifestyle again emphasized.  Patient had mild chest discomfort.   EKG does not show any acute changes.  She has normal coronary artery with minimal disease and has normal stress test in 2017.  No further work-up was scheduled at this time.  Follow-up scheduled        No Follow-up on file.

## 2019-07-01 RX ORDER — HYDROCHLOROTHIAZIDE 25 MG/1
TABLET ORAL
Qty: 45 TABLET | Refills: 2 | Status: SHIPPED | OUTPATIENT
Start: 2019-07-01 | End: 2020-04-13 | Stop reason: SDUPTHER

## 2019-07-01 RX ORDER — LEVOTHYROXINE SODIUM 0.05 MG/1
TABLET ORAL
Qty: 90 TABLET | Refills: 2 | Status: SHIPPED | OUTPATIENT
Start: 2019-07-01 | End: 2020-04-06

## 2019-08-09 RX ORDER — POTASSIUM CHLORIDE 750 MG/1
TABLET, FILM COATED, EXTENDED RELEASE ORAL
Qty: 90 TABLET | Refills: 2 | Status: SHIPPED | OUTPATIENT
Start: 2019-08-09 | End: 2020-05-20

## 2019-08-23 ENCOUNTER — LAB (OUTPATIENT)
Dept: LAB | Facility: HOSPITAL | Age: 69
End: 2019-08-23

## 2019-08-23 DIAGNOSIS — R53.83 OTHER FATIGUE: ICD-10-CM

## 2019-08-23 DIAGNOSIS — E78.2 MIXED HYPERLIPIDEMIA: ICD-10-CM

## 2019-08-23 DIAGNOSIS — I10 ESSENTIAL HYPERTENSION: ICD-10-CM

## 2019-08-23 LAB
ALBUMIN SERPL-MCNC: 4.3 G/DL (ref 3.5–5.2)
ALBUMIN/GLOB SERPL: 1.5 G/DL
ALP SERPL-CCNC: 113 U/L (ref 39–117)
ALT SERPL W P-5'-P-CCNC: 5 U/L (ref 1–33)
ANION GAP SERPL CALCULATED.3IONS-SCNC: 12.3 MMOL/L (ref 5–15)
AST SERPL-CCNC: 17 U/L (ref 1–32)
BILIRUB SERPL-MCNC: 0.4 MG/DL (ref 0.2–1.2)
BUN BLD-MCNC: 11 MG/DL (ref 8–23)
BUN/CREAT SERPL: 13.4 (ref 7–25)
CALCIUM SPEC-SCNC: 9.5 MG/DL (ref 8.6–10.5)
CHLORIDE SERPL-SCNC: 104 MMOL/L (ref 98–107)
CHOLEST SERPL-MCNC: 189 MG/DL (ref 0–200)
CK SERPL-CCNC: 75 U/L (ref 20–180)
CO2 SERPL-SCNC: 23.7 MMOL/L (ref 22–29)
CREAT BLD-MCNC: 0.82 MG/DL (ref 0.57–1)
DEPRECATED RDW RBC AUTO: 55.8 FL (ref 37–54)
EOSINOPHIL # BLD MANUAL: 0.13 10*3/MM3 (ref 0–0.4)
EOSINOPHIL NFR BLD MANUAL: 2 % (ref 0.3–6.2)
ERYTHROCYTE [DISTWIDTH] IN BLOOD BY AUTOMATED COUNT: 16.5 % (ref 12.3–15.4)
GFR SERPL CREATININE-BSD FRML MDRD: 70 ML/MIN/1.73
GLOBULIN UR ELPH-MCNC: 2.8 GM/DL
GLUCOSE BLD-MCNC: 82 MG/DL (ref 65–99)
HCT VFR BLD AUTO: 42.5 % (ref 34–46.6)
HDLC SERPL-MCNC: 69 MG/DL (ref 40–60)
HGB BLD-MCNC: 12.7 G/DL (ref 12–15.9)
LDLC SERPL CALC-MCNC: 97 MG/DL (ref 0–100)
LDLC/HDLC SERPL: 1.41 {RATIO}
LYMPHOCYTES # BLD MANUAL: 1.73 10*3/MM3 (ref 0.7–3.1)
LYMPHOCYTES NFR BLD MANUAL: 26 % (ref 19.6–45.3)
LYMPHOCYTES NFR BLD MANUAL: 5 % (ref 5–12)
MCH RBC QN AUTO: 27.4 PG (ref 26.6–33)
MCHC RBC AUTO-ENTMCNC: 29.9 G/DL (ref 31.5–35.7)
MCV RBC AUTO: 91.8 FL (ref 79–97)
MONOCYTES # BLD AUTO: 0.33 10*3/MM3 (ref 0.1–0.9)
NEUTROPHILS # BLD AUTO: 4.45 10*3/MM3 (ref 1.7–7)
NEUTROPHILS NFR BLD MANUAL: 67 % (ref 42.7–76)
PLAT MORPH BLD: NORMAL
PLATELET # BLD AUTO: 142 10*3/MM3 (ref 140–450)
PMV BLD AUTO: ABNORMAL FL
POTASSIUM BLD-SCNC: 4.5 MMOL/L (ref 3.5–5.2)
PROT SERPL-MCNC: 7.1 G/DL (ref 6–8.5)
RBC # BLD AUTO: 4.63 10*6/MM3 (ref 3.77–5.28)
RBC MORPH BLD: NORMAL
SODIUM BLD-SCNC: 140 MMOL/L (ref 136–145)
T4 FREE SERPL-MCNC: 1.37 NG/DL (ref 0.93–1.7)
TRIGL SERPL-MCNC: 114 MG/DL (ref 0–150)
TSH SERPL DL<=0.05 MIU/L-ACNC: 2.83 MIU/ML (ref 0.27–4.2)
VLDLC SERPL-MCNC: 22.8 MG/DL (ref 5–40)
WBC MORPH BLD: NORMAL
WBC NRBC COR # BLD: 6.64 10*3/MM3 (ref 3.4–10.8)

## 2019-08-23 PROCEDURE — 85025 COMPLETE CBC W/AUTO DIFF WBC: CPT

## 2019-08-23 PROCEDURE — 80061 LIPID PANEL: CPT

## 2019-08-23 PROCEDURE — 84439 ASSAY OF FREE THYROXINE: CPT

## 2019-08-23 PROCEDURE — 84443 ASSAY THYROID STIM HORMONE: CPT

## 2019-08-23 PROCEDURE — 82550 ASSAY OF CK (CPK): CPT

## 2019-08-23 PROCEDURE — 80053 COMPREHEN METABOLIC PANEL: CPT

## 2019-08-23 PROCEDURE — 85007 BL SMEAR W/DIFF WBC COUNT: CPT

## 2019-09-04 RX ORDER — NEBIVOLOL HYDROCHLORIDE 10 MG/1
TABLET ORAL
Qty: 90 TABLET | Refills: 0 | Status: SHIPPED | OUTPATIENT
Start: 2019-09-04 | End: 2019-09-11 | Stop reason: SDUPTHER

## 2019-09-04 RX ORDER — OLMESARTAN MEDOXOMIL 20 MG/1
TABLET ORAL
Qty: 180 TABLET | Refills: 0 | Status: SHIPPED | OUTPATIENT
Start: 2019-09-04 | End: 2019-12-10 | Stop reason: SDUPTHER

## 2019-09-11 ENCOUNTER — OFFICE VISIT (OUTPATIENT)
Dept: CARDIOLOGY | Facility: CLINIC | Age: 69
End: 2019-09-11

## 2019-09-11 VITALS
SYSTOLIC BLOOD PRESSURE: 138 MMHG | HEART RATE: 60 BPM | BODY MASS INDEX: 27.88 KG/M2 | OXYGEN SATURATION: 98 % | DIASTOLIC BLOOD PRESSURE: 88 MMHG | WEIGHT: 142 LBS | HEIGHT: 60 IN

## 2019-09-11 DIAGNOSIS — R00.1 BRADYCARDIA: Primary | ICD-10-CM

## 2019-09-11 DIAGNOSIS — I44.7 LBBB (LEFT BUNDLE BRANCH BLOCK): ICD-10-CM

## 2019-09-11 DIAGNOSIS — E03.4 HYPOTHYROIDISM DUE TO ACQUIRED ATROPHY OF THYROID: ICD-10-CM

## 2019-09-11 DIAGNOSIS — I10 ESSENTIAL HYPERTENSION: ICD-10-CM

## 2019-09-11 DIAGNOSIS — E78.2 MIXED HYPERLIPIDEMIA: ICD-10-CM

## 2019-09-11 DIAGNOSIS — Z78.9 STATIN INTOLERANCE: ICD-10-CM

## 2019-09-11 PROCEDURE — 99214 OFFICE O/P EST MOD 30 MIN: CPT | Performed by: INTERNAL MEDICINE

## 2019-09-11 PROCEDURE — 93000 ELECTROCARDIOGRAM COMPLETE: CPT | Performed by: INTERNAL MEDICINE

## 2019-09-11 RX ORDER — HYDRALAZINE HYDROCHLORIDE 10 MG/1
10 TABLET, FILM COATED ORAL 3 TIMES DAILY
Qty: 270 TABLET | Refills: 1 | Status: SHIPPED | OUTPATIENT
Start: 2019-09-11 | End: 2019-12-23 | Stop reason: SDUPTHER

## 2019-09-11 NOTE — PROGRESS NOTES
subjective     Chief Complaint   Patient presents with   • Results     labs   • Hypertension   • Fatigue     History of Present Illness  Patient is 69 years old white female who complains of feeling weak and being tired.  Blood pressure is fluctuating a lot.  It goes down to 110/46 and up to 160/84.  Heart rate is quite slow.  The chest pain but feels funny.  Patient also has hypothyroidism and is taking Synthroid 50 mcg daily    Patient also has hyperlipidemia and is taking Zetia and questron.  She is statin intolerant.    Past Surgical History:   Procedure Laterality Date   • CARDIAC CATHETERIZATION     • CARDIAC CATHETERIZATION  2015   • CARDIOVASCULAR STRESS TEST  2015   •  SECTION      x 3    • ECHO - CONVERTED  2015   • THYROID SURGERY      age 14+ 15, for cysts     Family History   Problem Relation Age of Onset   • Lung cancer Mother    • Stroke Father      Past Medical History:   Diagnosis Date   • Aortic insufficiency    • Asthma    • Disease of thyroid gland    • Essential hypertension    • SOB (shortness of breath)      Patient Active Problem List   Diagnosis   • Essential hypertension   • Hypothyroidism   • Gastroesophageal reflux disease without esophagitis   • Hyperlipidemia   • LBBB (left bundle branch block)   • Valvular heart disease trace AI, Mod MR   • Hypokalemia   • Chest pain in adult   • Statin intolerance and (no rhabdomyolysis ,no liver function abnormalities)   • Nonobstructive coronary artery disease    • Bradycardia       Social History     Tobacco Use   • Smoking status: Never Smoker   • Smokeless tobacco: Never Used   Substance Use Topics   • Alcohol use: No   • Drug use: No       Allergies   Allergen Reactions   • Aspirin Anaphylaxis   • Clonidine Unknown (See Comments)   • Eggs Or Egg-Derived Products    • Iron    • Losartan Hives, Itching and Swelling   • Ace Inhibitors Rash   • Albuterol Rash   • Lisinopril Rash       Current Outpatient Medications on File  "Prior to Visit   Medication Sig   • cetirizine (ZyrTEC) 10 MG tablet Take 10 mg by mouth daily.   • cholestyramine (QUESTRAN) 4 g packet Take 1 packet by mouth 2 (Two) Times a Day.   • ezetimibe (ZETIA) 10 MG tablet Take 1 tablet by mouth Daily.   • fluticasone-salmeterol (ADVAIR) 250-50 MCG/DOSE DISKUS Inhale 2 (two) times a day.   • hydrochlorothiazide (HYDRODIURIL) 25 MG tablet TAKE 1\2 TABLET BY MOUTH DAILY EVERY MORNING FOR FLUID & BLOOD PRESSURE   • levothyroxine (SYNTHROID, LEVOTHROID) 50 MCG tablet TAKE 1 TABLET EVERY MORNING ON AN EMPTY STOMACH FOR THYROID   • olmesartan (BENICAR) 20 MG tablet TAKE 2 TABLETS BY MOUTH ONCE DAILY   • potassium chloride (K-DUR) 10 MEQ CR tablet TAKE 1 TABLET BY MOUTH ONCE DAILY   • [DISCONTINUED] BYSTOLIC 10 MG tablet TAKE 1 TABLET BY MOUTH ONCE DAILY   • [DISCONTINUED] nebivolol (BYSTOLIC) 10 MG tablet Take 1 tablet by mouth Daily.     No current facility-administered medications on file prior to visit.          The following portions of the patient's history were reviewed and updated as appropriate: allergies, current medications, past family history, past medical history, past social history, past surgical history and problem list.    Review of Systems   Constitution: Positive for weakness and malaise/fatigue.   HENT: Negative.  Negative for congestion.    Eyes: Negative.    Cardiovascular: Negative.  Negative for chest pain, cyanosis, dyspnea on exertion, irregular heartbeat, leg swelling, near-syncope, orthopnea, palpitations, paroxysmal nocturnal dyspnea and syncope.        Slow heart rate   Respiratory: Negative.  Negative for shortness of breath.    Hematologic/Lymphatic: Negative.    Musculoskeletal: Negative.    Gastrointestinal: Negative.    Neurological: Negative for headaches.          Objective:     /88 (BP Location: Left arm, Patient Position: Sitting, Cuff Size: Adult)   Pulse 60   Ht 152.4 cm (60\")   Wt 64.4 kg (142 lb)   SpO2 98%   BMI 27.73 kg/m² "   Physical Exam   Constitutional: She appears well-developed and well-nourished. No distress.   HENT:   Head: Normocephalic and atraumatic.   Mouth/Throat: Oropharynx is clear and moist. No oropharyngeal exudate.   Eyes: Conjunctivae and EOM are normal. Pupils are equal, round, and reactive to light. No scleral icterus.   Neck: Normal range of motion. Neck supple. No JVD present. No tracheal deviation present. No thyromegaly present.   Cardiovascular: Normal rate, regular rhythm, normal heart sounds and intact distal pulses. PMI is not displaced. Exam reveals no gallop, no friction rub and no decreased pulses.   No murmur heard.  Pulses:       Carotid pulses are 3+ on the right side, and 3+ on the left side.       Radial pulses are 3+ on the right side, and 3+ on the left side.   Pulmonary/Chest: Effort normal and breath sounds normal. No respiratory distress. She has no wheezes. She has no rales. She exhibits no tenderness.   Abdominal: Soft. Bowel sounds are normal. She exhibits no distension, no abdominal bruit and no mass. There is no splenomegaly or hepatomegaly. There is no tenderness. There is no rebound and no guarding.   Musculoskeletal: Normal range of motion. She exhibits no edema, tenderness or deformity.   Lymphadenopathy:     She has no cervical adenopathy.   Neurological: She is alert. She has normal reflexes. No cranial nerve deficit. She exhibits normal muscle tone. Coordination normal.   Skin: Skin is warm and dry. No rash noted. She is not diaphoretic. No erythema.   Psychiatric: She has a normal mood and affect. Her behavior is normal. Judgment and thought content normal.         Lab Review  T4 TSH normal  Lipid panel is normal with LDL of 97  Comprehensive metabolic panel normal, hemoglobin hematocrit is also normal.        ECG 12 Lead  Date/Time: 9/11/2019 4:04 PM  Performed by: John Banda MD  Authorized by: John Banda MD   Comparison: compared with previous ECG from  6/4/2019  Comparison to previous ECG: PACs are no longer noted  Rhythm: sinus rhythm and sinus bradycardia  Rate: bradycardic  BPM: 56  Conduction: left bundle branch block  QRS axis: left    Clinical impression: abnormal EKG               I personally viewed and interpreted the patient's LAB data         Assessment:     1. Bradycardia    2. LBBB (left bundle branch block)    3. Essential hypertension    4. Mixed hyperlipidemia    5. Hypothyroidism due to acquired atrophy of thyroid    6. Statin intolerance and (no rhabdomyolysis ,no liver function abnormalities)          Plan:     Patient has bradycardia and chronic left bundle branch block and left axis deviation.  She was advised to discontinue bystolic.  She was started on hydralazine 10 mg 3 times daily.  She will continue Benicar 20 twice daily, hydrochlorothiazide 12.5 daily.  K. Dur 10 was continued.  Last lab work was discussed.  Synthroid 50 was continued.  She will also continue Zetia and questron.  Follow-up scheduled with lab work        No Follow-up on file.

## 2019-11-14 RX ORDER — EZETIMIBE 10 MG/1
TABLET ORAL
Qty: 90 TABLET | Refills: 2 | Status: SHIPPED | OUTPATIENT
Start: 2019-11-14 | End: 2020-05-20

## 2019-12-10 RX ORDER — OLMESARTAN MEDOXOMIL 20 MG/1
TABLET ORAL
Qty: 180 TABLET | Refills: 0 | Status: SHIPPED | OUTPATIENT
Start: 2019-12-10 | End: 2020-05-20

## 2019-12-23 ENCOUNTER — OFFICE VISIT (OUTPATIENT)
Dept: CARDIOLOGY | Facility: CLINIC | Age: 69
End: 2019-12-23

## 2019-12-23 VITALS
WEIGHT: 143 LBS | HEIGHT: 60 IN | DIASTOLIC BLOOD PRESSURE: 100 MMHG | RESPIRATION RATE: 16 BRPM | OXYGEN SATURATION: 99 % | SYSTOLIC BLOOD PRESSURE: 142 MMHG | HEART RATE: 63 BPM | BODY MASS INDEX: 28.07 KG/M2

## 2019-12-23 DIAGNOSIS — E78.2 MIXED HYPERLIPIDEMIA: ICD-10-CM

## 2019-12-23 DIAGNOSIS — E03.4 HYPOTHYROIDISM DUE TO ACQUIRED ATROPHY OF THYROID: ICD-10-CM

## 2019-12-23 DIAGNOSIS — I10 ESSENTIAL HYPERTENSION: Primary | ICD-10-CM

## 2019-12-23 PROCEDURE — 99214 OFFICE O/P EST MOD 30 MIN: CPT | Performed by: INTERNAL MEDICINE

## 2019-12-23 RX ORDER — HYDRALAZINE HYDROCHLORIDE 10 MG/1
10 TABLET, FILM COATED ORAL 4 TIMES DAILY
Qty: 360 TABLET | Refills: 0 | Status: SHIPPED | OUTPATIENT
Start: 2019-12-23 | End: 2020-06-15

## 2019-12-23 NOTE — PROGRESS NOTES
subjective     Chief Complaint   Patient presents with   • Hypertension   • Hyperlipidemia   • Hypothyroidism     History of Present Illness    Patient is 69 years old white female with multiple chronic medical problems.    Blood pressure is running high.  She is taking her medications regularly.  She is taking hydralazine 10 mg 3 times daily hydrochlorothiazide 12.5 daily Benicar 20 twice daily  Bystolic had to be discontinued because of bradycardia.    Patient has hyperlipidemia she is statin intolerant.  She is taking questron and Zetia.  Lab work will be scheduled for next visit.    She also has hypothyroidism and is taking Synthroid 50 mcg daily.  No drug side effects.    Past Surgical History:   Procedure Laterality Date   • CARDIAC CATHETERIZATION     • CARDIAC CATHETERIZATION  2015   • CARDIOVASCULAR STRESS TEST  2015   •  SECTION      x 3    • ECHO - CONVERTED  2015   • THYROID SURGERY      age 14+ 15, for cysts     Family History   Problem Relation Age of Onset   • Lung cancer Mother    • Stroke Father      Past Medical History:   Diagnosis Date   • Aortic insufficiency    • Asthma    • Disease of thyroid gland    • Essential hypertension    • SOB (shortness of breath)      Patient Active Problem List   Diagnosis   • Essential hypertension   • Hypothyroidism   • Gastroesophageal reflux disease without esophagitis   • Hyperlipidemia   • LBBB (left bundle branch block)   • Valvular heart disease trace AI, Mod MR   • Hypokalemia   • Chest pain in adult   • Statin intolerance and (no rhabdomyolysis ,no liver function abnormalities)   • Nonobstructive coronary artery disease    • Bradycardia       Social History     Tobacco Use   • Smoking status: Never Smoker   • Smokeless tobacco: Never Used   Substance Use Topics   • Alcohol use: No   • Drug use: No       Allergies   Allergen Reactions   • Aspirin Anaphylaxis   • Clonidine Unknown (See Comments)   • Eggs Or Egg-Derived Products    •  "Iron    • Losartan Hives, Itching and Swelling   • Ace Inhibitors Rash   • Albuterol Rash   • Lisinopril Rash       Current Outpatient Medications on File Prior to Visit   Medication Sig   • cetirizine (ZyrTEC) 10 MG tablet Take 10 mg by mouth daily.   • cholestyramine (QUESTRAN) 4 g packet Take 1 packet by mouth 2 (Two) Times a Day.   • ezetimibe (ZETIA) 10 MG tablet TAKE 1 TABLET BY MOUTH ONCE DAILY   • fluticasone-salmeterol (ADVAIR) 250-50 MCG/DOSE DISKUS Inhale 2 (two) times a day.   • hydrochlorothiazide (HYDRODIURIL) 25 MG tablet TAKE 1\2 TABLET BY MOUTH DAILY EVERY MORNING FOR FLUID & BLOOD PRESSURE   • levothyroxine (SYNTHROID, LEVOTHROID) 50 MCG tablet TAKE 1 TABLET EVERY MORNING ON AN EMPTY STOMACH FOR THYROID   • olmesartan (BENICAR) 20 MG tablet TAKE 2 TABLETS BY MOUTH ONCE DAILY   • potassium chloride (K-DUR) 10 MEQ CR tablet TAKE 1 TABLET BY MOUTH ONCE DAILY   • [DISCONTINUED] hydrALAZINE (APRESOLINE) 10 MG tablet Take 1 tablet by mouth 3 (Three) Times a Day.     No current facility-administered medications on file prior to visit.          The following portions of the patient's history were reviewed and updated as appropriate: allergies, current medications, past family history, past medical history, past social history, past surgical history and problem list.    Review of Systems   Constitution: Negative.   HENT: Negative.  Negative for congestion.    Eyes: Negative.    Cardiovascular: Negative.  Negative for chest pain, cyanosis, dyspnea on exertion, irregular heartbeat, leg swelling, near-syncope, orthopnea, palpitations, paroxysmal nocturnal dyspnea and syncope.   Respiratory: Negative.  Negative for shortness of breath.    Hematologic/Lymphatic: Negative.    Musculoskeletal: Negative.    Gastrointestinal: Negative.    Neurological: Negative.  Negative for headaches.          Objective:     /100 (BP Location: Left arm)   Pulse 63   Resp 16   Ht 152.4 cm (60\")   Wt 64.9 kg (143 lb)   " SpO2 99%   BMI 27.93 kg/m²   Physical Exam   Constitutional: She appears well-developed and well-nourished. No distress.   HENT:   Head: Normocephalic and atraumatic.   Mouth/Throat: Oropharynx is clear and moist. No oropharyngeal exudate.   Eyes: Pupils are equal, round, and reactive to light. Conjunctivae and EOM are normal. No scleral icterus.   Neck: Normal range of motion. Neck supple. No JVD present. No tracheal deviation present. No thyromegaly present.   Cardiovascular: Normal rate, regular rhythm, normal heart sounds and intact distal pulses. PMI is not displaced. Exam reveals no gallop, no friction rub and no decreased pulses.   No murmur heard.  Pulses:       Carotid pulses are 3+ on the right side, and 3+ on the left side.       Radial pulses are 3+ on the right side, and 3+ on the left side.   Pulmonary/Chest: Effort normal and breath sounds normal. No respiratory distress. She has no wheezes. She has no rales. She exhibits no tenderness.   Abdominal: Soft. Bowel sounds are normal. She exhibits no distension, no abdominal bruit and no mass. There is no splenomegaly or hepatomegaly. There is no tenderness. There is no rebound and no guarding.   Musculoskeletal: Normal range of motion. She exhibits no edema, tenderness or deformity.   Lymphadenopathy:     She has no cervical adenopathy.   Neurological: She is alert. She has normal reflexes. No cranial nerve deficit. She exhibits normal muscle tone. Coordination normal.   Skin: Skin is warm and dry. No rash noted. She is not diaphoretic. No erythema.   Psychiatric: She has a normal mood and affect. Her behavior is normal. Judgment and thought content normal.         Lab Review  Lab Results   Component Value Date     08/23/2019    K 4.5 08/23/2019     08/23/2019    BUN 11 08/23/2019    CREATININE 0.82 08/23/2019    GLUCOSE 82 08/23/2019    CALCIUM 9.5 08/23/2019    ALT 5 08/23/2019    ALKPHOS 113 08/23/2019    LABIL2 1.5 05/19/2015     Lab  Results   Component Value Date    CKTOTAL 75 08/23/2019     Lab Results   Component Value Date    WBC 6.64 08/23/2019    HGB 12.7 08/23/2019    HCT 42.5 08/23/2019     08/23/2019     Lab Results   Component Value Date    INR 0.89 01/17/2017    INR 0.96 07/23/2015     Lab Results   Component Value Date    MG 2.0 07/23/2015     Lab Results   Component Value Date    TSH 2.830 08/23/2019     Lab Results   Component Value Date    BNP 28.0 09/05/2018     Lab Results   Component Value Date    CHLPL 192 05/19/2015    CHOL 189 08/23/2019    TRIG 114 08/23/2019    HDL 69 (H) 08/23/2019    VLDL 22.8 08/23/2019    LDLHDL 1.41 08/23/2019     Lab Results   Component Value Date    LDL 97 08/23/2019    LDL 84 02/28/2019       Procedures       I personally viewed and interpreted the patient's LAB data         Assessment:     1. Essential hypertension    2. Mixed hyperlipidemia    3. Hypothyroidism due to acquired atrophy of thyroid          Plan:     Blood pressure is elevated.  Patient was advised to increase hydralazine 10 mg 4 times daily.  She will continue Benicar 20 twice daily, hydrochlorothiazide 12.5 daily and K. Dur.    Patient has hyperlipidemia and she was advised to continue questron and Zetia lab work scheduled for next visit.  Synthroid 50 mcg was continued lab work next visit.  Salt restriction was discussed.  Healthy lifestyle  Follow-up scheduled with lab work        No follow-ups on file.

## 2020-02-19 ENCOUNTER — LAB (OUTPATIENT)
Dept: LAB | Facility: HOSPITAL | Age: 70
End: 2020-02-19

## 2020-02-19 DIAGNOSIS — E03.4 HYPOTHYROIDISM DUE TO ACQUIRED ATROPHY OF THYROID: ICD-10-CM

## 2020-02-19 DIAGNOSIS — E78.2 MIXED HYPERLIPIDEMIA: ICD-10-CM

## 2020-02-19 LAB
ALBUMIN SERPL-MCNC: 3.9 G/DL (ref 3.5–5.2)
ALBUMIN/GLOB SERPL: 1.3 G/DL
ALP SERPL-CCNC: 119 U/L (ref 39–117)
ALT SERPL W P-5'-P-CCNC: 10 U/L (ref 1–33)
ANION GAP SERPL CALCULATED.3IONS-SCNC: 12.3 MMOL/L (ref 5–15)
AST SERPL-CCNC: 18 U/L (ref 1–32)
BASOPHILS # BLD AUTO: 0.06 10*3/MM3 (ref 0–0.2)
BASOPHILS NFR BLD AUTO: 1 % (ref 0–1.5)
BILIRUB SERPL-MCNC: 0.3 MG/DL (ref 0.2–1.2)
BUN BLD-MCNC: 13 MG/DL (ref 8–23)
BUN/CREAT SERPL: 14 (ref 7–25)
CALCIUM SPEC-SCNC: 8.9 MG/DL (ref 8.6–10.5)
CHLORIDE SERPL-SCNC: 105 MMOL/L (ref 98–107)
CHOLEST SERPL-MCNC: 200 MG/DL (ref 0–200)
CO2 SERPL-SCNC: 23.7 MMOL/L (ref 22–29)
CREAT BLD-MCNC: 0.93 MG/DL (ref 0.57–1)
DEPRECATED RDW RBC AUTO: 46 FL (ref 37–54)
EOSINOPHIL # BLD AUTO: 0.27 10*3/MM3 (ref 0–0.4)
EOSINOPHIL NFR BLD AUTO: 4.4 % (ref 0.3–6.2)
ERYTHROCYTE [DISTWIDTH] IN BLOOD BY AUTOMATED COUNT: 15 % (ref 12.3–15.4)
GFR SERPL CREATININE-BSD FRML MDRD: 60 ML/MIN/1.73
GLOBULIN UR ELPH-MCNC: 2.9 GM/DL
GLUCOSE BLD-MCNC: 83 MG/DL (ref 65–99)
HCT VFR BLD AUTO: 36.9 % (ref 34–46.6)
HDLC SERPL-MCNC: 78 MG/DL (ref 40–60)
HGB BLD-MCNC: 12.2 G/DL (ref 12–15.9)
LDLC SERPL CALC-MCNC: 109 MG/DL (ref 0–100)
LDLC/HDLC SERPL: 1.39 {RATIO}
LYMPHOCYTES # BLD AUTO: 2.3 10*3/MM3 (ref 0.7–3.1)
LYMPHOCYTES NFR BLD AUTO: 37.8 % (ref 19.6–45.3)
MCH RBC QN AUTO: 27.8 PG (ref 26.6–33)
MCHC RBC AUTO-ENTMCNC: 33.1 G/DL (ref 31.5–35.7)
MCV RBC AUTO: 84.1 FL (ref 79–97)
MONOCYTES # BLD AUTO: 0.61 10*3/MM3 (ref 0.1–0.9)
MONOCYTES NFR BLD AUTO: 10 % (ref 5–12)
NEUTROPHILS # BLD AUTO: 2.83 10*3/MM3 (ref 1.7–7)
NEUTROPHILS NFR BLD AUTO: 46.6 % (ref 42.7–76)
PLATELET # BLD AUTO: 152 10*3/MM3 (ref 140–450)
POTASSIUM BLD-SCNC: 4.3 MMOL/L (ref 3.5–5.2)
PROT SERPL-MCNC: 6.8 G/DL (ref 6–8.5)
RBC # BLD AUTO: 4.39 10*6/MM3 (ref 3.77–5.28)
SODIUM BLD-SCNC: 141 MMOL/L (ref 136–145)
T4 FREE SERPL-MCNC: 1.33 NG/DL (ref 0.93–1.7)
TRIGL SERPL-MCNC: 66 MG/DL (ref 0–150)
TSH SERPL DL<=0.05 MIU/L-ACNC: 2.24 UIU/ML (ref 0.27–4.2)
VLDLC SERPL-MCNC: 13.2 MG/DL (ref 5–40)
WBC NRBC COR # BLD: 6.08 10*3/MM3 (ref 3.4–10.8)

## 2020-02-19 PROCEDURE — 84443 ASSAY THYROID STIM HORMONE: CPT

## 2020-02-19 PROCEDURE — 84439 ASSAY OF FREE THYROXINE: CPT

## 2020-02-19 PROCEDURE — 36415 COLL VENOUS BLD VENIPUNCTURE: CPT

## 2020-02-19 PROCEDURE — 80053 COMPREHEN METABOLIC PANEL: CPT

## 2020-02-19 PROCEDURE — 85025 COMPLETE CBC W/AUTO DIFF WBC: CPT

## 2020-02-19 PROCEDURE — 80061 LIPID PANEL: CPT

## 2020-04-06 RX ORDER — LEVOTHYROXINE SODIUM 0.05 MG/1
TABLET ORAL
Qty: 90 TABLET | Refills: 1 | Status: SHIPPED | OUTPATIENT
Start: 2020-04-06 | End: 2020-10-09 | Stop reason: SDUPTHER

## 2020-04-13 ENCOUNTER — OFFICE VISIT (OUTPATIENT)
Dept: CARDIOLOGY | Facility: CLINIC | Age: 70
End: 2020-04-13

## 2020-04-13 VITALS — SYSTOLIC BLOOD PRESSURE: 127 MMHG | DIASTOLIC BLOOD PRESSURE: 64 MMHG | HEART RATE: 64 BPM

## 2020-04-13 DIAGNOSIS — Z78.9 STATIN INTOLERANCE: ICD-10-CM

## 2020-04-13 DIAGNOSIS — E03.4 HYPOTHYROIDISM DUE TO ACQUIRED ATROPHY OF THYROID: ICD-10-CM

## 2020-04-13 DIAGNOSIS — E78.2 MIXED HYPERLIPIDEMIA: Primary | ICD-10-CM

## 2020-04-13 DIAGNOSIS — I10 ESSENTIAL HYPERTENSION: ICD-10-CM

## 2020-04-13 PROCEDURE — 99442 PR PHYS/QHP TELEPHONE EVALUATION 11-20 MIN: CPT | Performed by: INTERNAL MEDICINE

## 2020-04-13 RX ORDER — HYDROCHLOROTHIAZIDE 25 MG/1
12.5 TABLET ORAL DAILY
Qty: 45 TABLET | Refills: 2 | Status: SHIPPED | OUTPATIENT
Start: 2020-04-13 | End: 2020-10-22

## 2020-04-13 NOTE — PROGRESS NOTES
subjective     Chief Complaint   Patient presents with   • Results     Labs,  Pt denies any chest pain, soa, palpitations   • Hypertension     Follow up   • Slow Heart Rate     Follow up     You have chosen to receive care through a telephone visit. Do you consent to use a telephone visit for your medical care today? Yes      Patient is 69 years old white female who is being evaluated via telephone visit.  Patient has history of hyperlipidemia however she has statin intolerance with history of rhabdomyolysis.  She is doing very well with Zetia and Questran.  Blood pressure has been well controlled with the hydralazine, hydrochlorothiazide and Benicar.  She also has hypothyroidism on Synthroid replacement therapy.  She states that she has been doing very well no chest pain palpitations or shortness of breath.  She also had lab work done     Past Surgical History:   Procedure Laterality Date   • CARDIAC CATHETERIZATION     • CARDIAC CATHETERIZATION  2015   • CARDIOVASCULAR STRESS TEST  2015   •  SECTION      x 3    • ECHO - CONVERTED  2015   • THYROID SURGERY      age 14+ 15, for cysts     Family History   Problem Relation Age of Onset   • Lung cancer Mother    • Stroke Father      Past Medical History:   Diagnosis Date   • Aortic insufficiency    • Asthma    • Disease of thyroid gland    • Essential hypertension    • SOB (shortness of breath)      Patient Active Problem List   Diagnosis   • Essential hypertension   • Hypothyroidism   • Gastroesophageal reflux disease without esophagitis   • Hyperlipidemia   • LBBB (left bundle branch block)   • Valvular heart disease trace AI, Mod MR   • Hypokalemia   • Chest pain in adult   • Statin intolerance and (no rhabdomyolysis ,no liver function abnormalities)   • Nonobstructive coronary artery disease    • Bradycardia       Social History     Tobacco Use   • Smoking status: Never Smoker   • Smokeless tobacco: Never Used   Substance Use Topics   •  Alcohol use: No   • Drug use: No       Allergies   Allergen Reactions   • Aspirin Anaphylaxis   • Iron Shortness Of Breath   • Losartan Hives, Itching and Swelling   • Clonidine Unknown (See Comments)   • Eggs Or Egg-Derived Products    • Ace Inhibitors Rash   • Albuterol Rash   • Lisinopril Rash       Current Outpatient Medications on File Prior to Visit   Medication Sig   • cetirizine (ZyrTEC) 10 MG tablet Take 10 mg by mouth daily.   • cholestyramine (QUESTRAN) 4 g packet Take 1 packet by mouth 2 (Two) Times a Day.   • ezetimibe (ZETIA) 10 MG tablet TAKE 1 TABLET BY MOUTH ONCE DAILY   • fluticasone-salmeterol (ADVAIR) 250-50 MCG/DOSE DISKUS Inhale 2 (two) times a day.   • hydrALAZINE (APRESOLINE) 10 MG tablet Take 1 tablet by mouth 4 (Four) Times a Day.   • levothyroxine (SYNTHROID, LEVOTHROID) 50 MCG tablet TAKE 1 TABLET EVERY MORNING ON AN EMPTY STOMACH FOR THYROID   • Multiple Vitamin (MULTI VITAMIN DAILY PO) Take  by mouth.   • olmesartan (BENICAR) 20 MG tablet TAKE 2 TABLETS BY MOUTH ONCE DAILY   • potassium chloride (K-DUR) 10 MEQ CR tablet TAKE 1 TABLET BY MOUTH ONCE DAILY   • [DISCONTINUED] hydrochlorothiazide (HYDRODIURIL) 25 MG tablet TAKE 1\2 TABLET BY MOUTH DAILY EVERY MORNING FOR FLUID & BLOOD PRESSURE     No current facility-administered medications on file prior to visit.          The following portions of the patient's history were reviewed and updated as appropriate: allergies, current medications, past family history, past medical history, past social history, past surgical history and problem list.    Review of Systems   Constitution: Negative.   HENT: Negative.  Negative for congestion.    Eyes: Negative.    Cardiovascular: Negative.  Negative for chest pain, cyanosis, dyspnea on exertion, irregular heartbeat, leg swelling, near-syncope, orthopnea, palpitations, paroxysmal nocturnal dyspnea and syncope.   Respiratory: Negative.  Negative for shortness of breath.    Hematologic/Lymphatic:  Negative.    Musculoskeletal: Negative.    Gastrointestinal: Negative.    Neurological: Negative.  Negative for headaches.          Objective:     /64 Comment: verbal per patient  Pulse 64 Comment: verbal per patient  Physical Exam   Constitutional:   Not done         Lab Review  Lab Results   Component Value Date     02/19/2020    K 4.3 02/19/2020     02/19/2020    BUN 13 02/19/2020    CREATININE 0.93 02/19/2020    GLUCOSE 83 02/19/2020    CALCIUM 8.9 02/19/2020    ALT 10 02/19/2020    ALKPHOS 119 (H) 02/19/2020    LABIL2 1.5 05/19/2015     Lab Results   Component Value Date    CKTOTAL 75 08/23/2019     Lab Results   Component Value Date    WBC 6.08 02/19/2020    HGB 12.2 02/19/2020    HCT 36.9 02/19/2020     02/19/2020     Lab Results   Component Value Date    INR 0.89 01/17/2017    INR 0.96 07/23/2015     Lab Results   Component Value Date    MG 2.0 07/23/2015     Lab Results   Component Value Date    TSH 2.240 02/19/2020     Lab Results   Component Value Date    BNP 28.0 09/05/2018     Lab Results   Component Value Date    CHLPL 192 05/19/2015    CHOL 200 02/19/2020    TRIG 66 02/19/2020    HDL 78 (H) 02/19/2020    VLDL 13.2 02/19/2020    LDLHDL 1.39 02/19/2020     Lab Results   Component Value Date     (H) 02/19/2020    LDL 97 08/23/2019       Procedures       I personally viewed and interpreted the patient's LAB data         Assessment:     1. Mixed hyperlipidemia    2. Essential hypertension    3. Statin intolerance and (no rhabdomyolysis ,no liver function abnormalities)    4. Hypothyroidism due to acquired atrophy of thyroid          Plan:   Blood pressure is very well controlled     Patient was advised to continue hydralazine, hydrochlorothiazide and Benicar along with potassium.  Hyperlipidemia has been uncontrolled but better than before LDL is still high at 109.  She will continue questron and Zetia, dietary restrictions were discussed.  Hypothyroidism is controlled with  Synthroid which was continued at 50 mcg daily.  Follow-up scheduled  Healthy lifestyle discussed.  Lab work reviewed  This visit has been rescheduled as a phone visit to comply with patient safety concerns in accordance with CDC recommendations. Total time of discussion was 15 minutes.        No follow-ups on file.

## 2020-05-20 RX ORDER — POTASSIUM CHLORIDE 750 MG/1
TABLET, FILM COATED, EXTENDED RELEASE ORAL
Qty: 90 TABLET | Refills: 1 | Status: SHIPPED | OUTPATIENT
Start: 2020-05-20 | End: 2020-10-22

## 2020-05-20 RX ORDER — EZETIMIBE 10 MG/1
TABLET ORAL
Qty: 90 TABLET | Refills: 1 | Status: SHIPPED | OUTPATIENT
Start: 2020-05-20 | End: 2020-12-08

## 2020-05-20 RX ORDER — OLMESARTAN MEDOXOMIL 20 MG/1
TABLET ORAL
Qty: 60 TABLET | Refills: 1 | Status: SHIPPED | OUTPATIENT
Start: 2020-05-20 | End: 2020-07-20

## 2020-06-15 RX ORDER — HYDRALAZINE HYDROCHLORIDE 10 MG/1
TABLET, FILM COATED ORAL
Qty: 360 TABLET | Refills: 0 | Status: SHIPPED | OUTPATIENT
Start: 2020-06-15 | End: 2020-09-28

## 2020-07-20 RX ORDER — OLMESARTAN MEDOXOMIL 20 MG/1
TABLET ORAL
Qty: 60 TABLET | Refills: 0 | Status: SHIPPED | OUTPATIENT
Start: 2020-07-20 | End: 2020-08-20

## 2020-07-23 ENCOUNTER — OFFICE VISIT (OUTPATIENT)
Dept: CARDIOLOGY | Facility: CLINIC | Age: 70
End: 2020-07-23

## 2020-07-23 VITALS
OXYGEN SATURATION: 99 % | TEMPERATURE: 99.3 F | HEIGHT: 60 IN | HEART RATE: 88 BPM | BODY MASS INDEX: 28.86 KG/M2 | WEIGHT: 147 LBS | SYSTOLIC BLOOD PRESSURE: 152 MMHG | DIASTOLIC BLOOD PRESSURE: 70 MMHG

## 2020-07-23 DIAGNOSIS — E78.2 MIXED HYPERLIPIDEMIA: ICD-10-CM

## 2020-07-23 DIAGNOSIS — E03.4 HYPOTHYROIDISM DUE TO ACQUIRED ATROPHY OF THYROID: ICD-10-CM

## 2020-07-23 DIAGNOSIS — I38 VALVULAR HEART DISEASE: ICD-10-CM

## 2020-07-23 DIAGNOSIS — I44.7 LBBB (LEFT BUNDLE BRANCH BLOCK): ICD-10-CM

## 2020-07-23 DIAGNOSIS — I10 ESSENTIAL HYPERTENSION: Primary | ICD-10-CM

## 2020-07-23 PROCEDURE — 99214 OFFICE O/P EST MOD 30 MIN: CPT | Performed by: INTERNAL MEDICINE

## 2020-07-23 NOTE — PROGRESS NOTES
subjective     Chief Complaint   Patient presents with   • Hypertension   • Hyperlipidemia     History of Present Illness  Patient is 70 years old white female who is here for cardiology follow-up.  Patient has history of essential hypertension.  Blood pressure is elevated today it is 150/70 however patient states that blood pressure is very good at home.  According to her blood pressure was 112/70 yesterday.  She is taking hydralazine, hydrochlorothiazide and Benicar.    She also has hyperlipidemia but states that she does not tolerate statin therapy she is currently taking questron and Zetia.  Lab work will be scheduled    Patient also has hypothyroidism on Synthroid replacement therapy.    She states that she is always tired because she does not sleep much at night.  She has to take care of mother-in-law who is quite sick and is confused that puts a lot of stress on her.    Past Surgical History:   Procedure Laterality Date   • CARDIAC CATHETERIZATION     • CARDIAC CATHETERIZATION  2015   • CARDIOVASCULAR STRESS TEST  2015   •  SECTION      x 3    • ECHO - CONVERTED  2015   • THYROID SURGERY      age 14+ 15, for cysts     Family History   Problem Relation Age of Onset   • Lung cancer Mother    • Stroke Father      Past Medical History:   Diagnosis Date   • Aortic insufficiency    • Asthma    • Disease of thyroid gland    • Essential hypertension    • SOB (shortness of breath)      Patient Active Problem List   Diagnosis   • Essential hypertension   • Hypothyroidism   • Gastroesophageal reflux disease without esophagitis   • Hyperlipidemia   • LBBB (left bundle branch block)   • Valvular heart disease trace AI, Mod MR   • Hypokalemia   • Chest pain in adult   • Statin intolerance and (no rhabdomyolysis ,no liver function abnormalities)   • Nonobstructive coronary artery disease    • Bradycardia       Social History     Tobacco Use   • Smoking status: Never Smoker   • Smokeless tobacco:  Never Used   Substance Use Topics   • Alcohol use: No   • Drug use: No       Allergies   Allergen Reactions   • Aspirin Anaphylaxis   • Iron Shortness Of Breath   • Losartan Hives, Itching and Swelling   • Clonidine Unknown (See Comments)   • Eggs Or Egg-Derived Products    • Ace Inhibitors Rash   • Albuterol Rash   • Lisinopril Rash       Current Outpatient Medications on File Prior to Visit   Medication Sig   • cetirizine (ZyrTEC) 10 MG tablet Take 10 mg by mouth daily.   • cholestyramine (QUESTRAN) 4 g packet Take 1 packet by mouth 2 (Two) Times a Day.   • ezetimibe (ZETIA) 10 MG tablet TAKE 1 TABLET BY MOUTH ONCE DAILY   • fluticasone-salmeterol (ADVAIR) 250-50 MCG/DOSE DISKUS Inhale 2 (two) times a day.   • hydrALAZINE (APRESOLINE) 10 MG tablet TAKE 1 TABLET BY MOUTH 4 TIMES A DAY   • hydroCHLOROthiazide (HYDRODIURIL) 25 MG tablet Take 0.5 tablets by mouth Daily.   • levothyroxine (SYNTHROID, LEVOTHROID) 50 MCG tablet TAKE 1 TABLET EVERY MORNING ON AN EMPTY STOMACH FOR THYROID   • Multiple Vitamin (MULTI VITAMIN DAILY PO) Take  by mouth.   • olmesartan (BENICAR) 20 MG tablet TAKE 2 TABLETS BY MOUTH ONCE DAILY   • potassium chloride (K-DUR) 10 MEQ CR tablet TAKE 1 TABLET BY MOUTH ONCE DAILY     No current facility-administered medications on file prior to visit.          The following portions of the patient's history were reviewed and updated as appropriate: allergies, current medications, past family history, past medical history, past social history, past surgical history and problem list.    Review of Systems   Constitution: Negative.   HENT: Negative.  Negative for congestion.    Eyes: Negative.    Cardiovascular: Negative.  Negative for chest pain, cyanosis, dyspnea on exertion, irregular heartbeat, leg swelling, near-syncope, orthopnea, palpitations, paroxysmal nocturnal dyspnea and syncope.   Respiratory: Negative.  Negative for shortness of breath.    Hematologic/Lymphatic: Negative.    Musculoskeletal:  "Negative.    Gastrointestinal: Negative.    Neurological: Negative.  Negative for headaches.          Objective:     /70 (BP Location: Left arm, Patient Position: Sitting, Cuff Size: Adult)   Pulse 88   Temp 99.3 °F (37.4 °C)   Ht 152.4 cm (60\")   Wt 66.7 kg (147 lb)   SpO2 99%   BMI 28.71 kg/m²   Physical Exam   Constitutional: She appears well-developed and well-nourished. No distress.   HENT:   Head: Normocephalic and atraumatic.   Mouth/Throat: Oropharynx is clear and moist. No oropharyngeal exudate.   Eyes: Pupils are equal, round, and reactive to light. Conjunctivae and EOM are normal. No scleral icterus.   Neck: Normal range of motion. Neck supple. No JVD present. No tracheal deviation present. No thyromegaly present.   Cardiovascular: Normal rate, regular rhythm, normal heart sounds and intact distal pulses. PMI is not displaced. Exam reveals no gallop, no friction rub and no decreased pulses.   No murmur heard.  Pulses:       Carotid pulses are 3+ on the right side, and 3+ on the left side.       Radial pulses are 3+ on the right side, and 3+ on the left side.   Pulmonary/Chest: Effort normal and breath sounds normal. No respiratory distress. She has no wheezes. She has no rales. She exhibits no tenderness.   Abdominal: Soft. Bowel sounds are normal. She exhibits no distension, no abdominal bruit and no mass. There is no splenomegaly or hepatomegaly. There is no tenderness. There is no rebound and no guarding.   Musculoskeletal: Normal range of motion. She exhibits no edema, tenderness or deformity.   Lymphadenopathy:     She has no cervical adenopathy.   Neurological: She is alert. She has normal reflexes. No cranial nerve deficit. She exhibits normal muscle tone. Coordination normal.   Skin: Skin is warm and dry. No rash noted. She is not diaphoretic. No erythema.   Psychiatric: She has a normal mood and affect. Her behavior is normal. Judgment and thought content normal.         Lab " Review  Lab Results   Component Value Date     02/19/2020    K 4.3 02/19/2020     02/19/2020    BUN 13 02/19/2020    CREATININE 0.93 02/19/2020    GLUCOSE 83 02/19/2020    CALCIUM 8.9 02/19/2020    ALT 10 02/19/2020    ALKPHOS 119 (H) 02/19/2020    LABIL2 1.5 05/19/2015     Lab Results   Component Value Date    CKTOTAL 75 08/23/2019     Lab Results   Component Value Date    WBC 6.08 02/19/2020    HGB 12.2 02/19/2020    HCT 36.9 02/19/2020     02/19/2020     Lab Results   Component Value Date    INR 0.89 01/17/2017    INR 0.96 07/23/2015     Lab Results   Component Value Date    MG 2.0 07/23/2015     Lab Results   Component Value Date    TSH 2.240 02/19/2020     Lab Results   Component Value Date    BNP 28.0 09/05/2018     Lab Results   Component Value Date    CHLPL 192 05/19/2015    CHOL 200 02/19/2020    TRIG 66 02/19/2020    HDL 78 (H) 02/19/2020    VLDL 13.2 02/19/2020    LDLHDL 1.39 02/19/2020     Lab Results   Component Value Date     (H) 02/19/2020    LDL 97 08/23/2019       Procedures       I personally viewed and interpreted the patient's LAB data         Assessment:     1. Essential hypertension    2. Valvular heart disease trace AI, Mod MR    3. LBBB (left bundle branch block)    4. Mixed hyperlipidemia    5. Hypothyroidism due to acquired atrophy of thyroid          Plan:     Blood pressure is elevated today however patient insisted blood pressure is good too low at home.  She was advised to monitor blood pressure closely and take extra hydralazine if needed.  Patient also has trivial AI and moderate mitral regurgitation is completely asymptomatic.  Hyperlipidemia was not controlled on last visit patient does not wish to take statin therapy healthy lifestyle was stressed she will continue Questran and Zetia lab work scheduled.  Synthroid was continued T4 and TSH was also scheduled.  Follow-up scheduled      No follow-ups on file.

## 2020-08-20 RX ORDER — OLMESARTAN MEDOXOMIL 20 MG/1
TABLET ORAL
Qty: 60 TABLET | Refills: 0 | Status: SHIPPED | OUTPATIENT
Start: 2020-08-20 | End: 2020-09-17

## 2020-09-17 RX ORDER — OLMESARTAN MEDOXOMIL 20 MG/1
TABLET ORAL
Qty: 60 TABLET | Refills: 3 | Status: SHIPPED | OUTPATIENT
Start: 2020-09-17 | End: 2021-01-22

## 2020-09-28 RX ORDER — HYDRALAZINE HYDROCHLORIDE 10 MG/1
TABLET, FILM COATED ORAL
Qty: 360 TABLET | Refills: 0 | Status: SHIPPED | OUTPATIENT
Start: 2020-09-28 | End: 2021-01-04

## 2020-10-09 RX ORDER — LEVOTHYROXINE SODIUM 0.05 MG/1
TABLET ORAL
Qty: 90 TABLET | Refills: 0 | Status: SHIPPED | OUTPATIENT
Start: 2020-10-09 | End: 2020-12-18

## 2020-10-22 ENCOUNTER — OFFICE VISIT (OUTPATIENT)
Dept: CARDIOLOGY | Facility: CLINIC | Age: 70
End: 2020-10-22

## 2020-10-22 VITALS — SYSTOLIC BLOOD PRESSURE: 114 MMHG | HEART RATE: 81 BPM | DIASTOLIC BLOOD PRESSURE: 74 MMHG

## 2020-10-22 DIAGNOSIS — I10 ESSENTIAL HYPERTENSION: ICD-10-CM

## 2020-10-22 DIAGNOSIS — E03.4 HYPOTHYROIDISM DUE TO ACQUIRED ATROPHY OF THYROID: ICD-10-CM

## 2020-10-22 DIAGNOSIS — J06.9 ACUTE URI: Primary | ICD-10-CM

## 2020-10-22 DIAGNOSIS — E78.2 MIXED HYPERLIPIDEMIA: ICD-10-CM

## 2020-10-22 DIAGNOSIS — Z78.9 STATIN INTOLERANCE: ICD-10-CM

## 2020-10-22 PROCEDURE — 99442 PR PHYS/QHP TELEPHONE EVALUATION 11-20 MIN: CPT | Performed by: INTERNAL MEDICINE

## 2020-10-22 RX ORDER — NITROGLYCERIN 0.4 MG/1
TABLET SUBLINGUAL
COMMUNITY
Start: 2020-08-28

## 2020-10-22 RX ORDER — CEFDINIR 300 MG/1
300 CAPSULE ORAL 2 TIMES DAILY
Qty: 14 CAPSULE | Refills: 0 | Status: SHIPPED | OUTPATIENT
Start: 2020-10-22 | End: 2021-02-24

## 2020-10-22 NOTE — PROGRESS NOTES
subjective     Chief Complaint   Patient presents with   • Sinus Problem     with cough and a low grade temp 99.2   • Hyperlipidemia     Follow up, pt will get labs next week, has been ill so didnt get yet   • Hypertension     History of Present Illness       You have chosen to receive care through a telephone visit. Do you consent to use a telephone visit for your medical care today? Yes    Patient is 70 years old white female who is being evaluated via telephone visit.    She states that she has been having some sinus congestion and low-grade fever.  Minimal cough with no expectoration.  She has not been exposed to Covid.  She has been staying home.  She did denies any shortness of breath.    Her blood pressure has been running low and she did not take any hydrochlorothiazide in last few days.  Patient has hyperlipidemia and has been taking Zetia and Questran but she did not get any lab work done.    Other problems consist of hypothyroidism on Synthroid replacement therapy but no TSH recently    Past Surgical History:   Procedure Laterality Date   • CARDIAC CATHETERIZATION     • CARDIAC CATHETERIZATION  2015   • CARDIOVASCULAR STRESS TEST  2015   •  SECTION      x 3    • ECHO - CONVERTED  2015   • THYROID SURGERY      age 14+ 15, for cysts     Family History   Problem Relation Age of Onset   • Lung cancer Mother    • Stroke Father      Past Medical History:   Diagnosis Date   • Aortic insufficiency    • Asthma    • Disease of thyroid gland    • Essential hypertension    • SOB (shortness of breath)      Patient Active Problem List   Diagnosis   • Essential hypertension   • Hypothyroidism   • Gastroesophageal reflux disease without esophagitis   • Hyperlipidemia   • LBBB (left bundle branch block)   • Valvular heart disease trace AI, Mod MR   • Hypokalemia   • Chest pain in adult   • Statin intolerance and (no rhabdomyolysis ,no liver function abnormalities)   • Nonobstructive coronary artery  disease 2015   • Bradycardia   • Acute URI       Social History     Tobacco Use   • Smoking status: Never Smoker   • Smokeless tobacco: Never Used   Substance Use Topics   • Alcohol use: No   • Drug use: No       Allergies   Allergen Reactions   • Aspirin Anaphylaxis   • Iron Shortness Of Breath   • Losartan Hives, Itching and Swelling   • Clonidine Unknown (See Comments)   • Eggs Or Egg-Derived Products    • Ace Inhibitors Rash   • Albuterol Rash   • Lisinopril Rash       Current Outpatient Medications on File Prior to Visit   Medication Sig   • cetirizine (ZyrTEC) 10 MG tablet Take 10 mg by mouth daily.   • cholestyramine (QUESTRAN) 4 g packet Take 1 packet by mouth 2 (Two) Times a Day.   • ezetimibe (ZETIA) 10 MG tablet TAKE 1 TABLET BY MOUTH ONCE DAILY   • fluticasone-salmeterol (ADVAIR) 250-50 MCG/DOSE DISKUS Inhale 2 (two) times a day.   • hydrALAZINE (APRESOLINE) 10 MG tablet TAKE 1 TABLET BY MOUTH 4 TIMES A DAY   • levothyroxine (SYNTHROID, LEVOTHROID) 50 MCG tablet TAKE 1 TABLET BY MOUTH EVERY MORNING ON AN EMPTY STOMACH   • Multiple Vitamin (MULTI VITAMIN DAILY PO) Take  by mouth.   • nitroglycerin (NITROSTAT) 0.4 MG SL tablet    • olmesartan (BENICAR) 20 MG tablet TAKE 2 TABLETS BY MOUTH ONCE DAILY   • [DISCONTINUED] hydroCHLOROthiazide (HYDRODIURIL) 25 MG tablet Take 0.5 tablets by mouth Daily.   • [DISCONTINUED] potassium chloride (K-DUR) 10 MEQ CR tablet TAKE 1 TABLET BY MOUTH ONCE DAILY   • [DISCONTINUED] levothyroxine (SYNTHROID, LEVOTHROID) 50 MCG tablet TAKE 1 TABLET EVERY MORNING ON AN EMPTY STOMACH FOR THYROID     No current facility-administered medications on file prior to visit.          The following portions of the patient's history were reviewed and updated as appropriate: allergies, current medications, past family history, past medical history, past social history, past surgical history and problem list.    Review of Systems   Constitution: Positive for fever.   HENT: Negative.  Negative  for congestion.    Eyes: Negative.    Cardiovascular: Negative.  Negative for chest pain, cyanosis, dyspnea on exertion, irregular heartbeat, leg swelling, near-syncope, orthopnea, palpitations, paroxysmal nocturnal dyspnea and syncope.   Respiratory: Positive for cough. Negative for shortness of breath.    Hematologic/Lymphatic: Negative.    Musculoskeletal: Negative.    Gastrointestinal: Negative.    Neurological: Negative.  Negative for headaches.          Objective:     /74 Comment: verbal per patient  Pulse 81 Comment: verbal per patient  Physical Exam  Not done telephone visit    Lab Review  Lab Results   Component Value Date     02/19/2020    K 4.3 02/19/2020     02/19/2020    BUN 13 02/19/2020    CREATININE 0.93 02/19/2020    GLUCOSE 83 02/19/2020    CALCIUM 8.9 02/19/2020    ALT 10 02/19/2020    ALKPHOS 119 (H) 02/19/2020    LABIL2 1.5 05/19/2015     Lab Results   Component Value Date    CKTOTAL 75 08/23/2019     Lab Results   Component Value Date    WBC 6.08 02/19/2020    HGB 12.2 02/19/2020    HCT 36.9 02/19/2020     02/19/2020     Lab Results   Component Value Date    INR 0.89 01/17/2017    INR 0.96 07/23/2015     Lab Results   Component Value Date    MG 2.0 07/23/2015     Lab Results   Component Value Date    TSH 2.240 02/19/2020     Lab Results   Component Value Date    BNP 28.0 09/05/2018     Lab Results   Component Value Date    CHLPL 192 05/19/2015    CHOL 200 02/19/2020    TRIG 66 02/19/2020    HDL 78 (H) 02/19/2020    VLDL 13.2 02/19/2020    LDLHDL 1.39 02/19/2020     Lab Results   Component Value Date     (H) 02/19/2020    LDL 97 08/23/2019     Patient did not have any recent lab work.  Lab work scheduled again.  Procedures       I personally viewed and interpreted the patient's LAB data         Assessment:     1. Acute URI    2. Mixed hyperlipidemia    3. Essential hypertension    4. Hypothyroidism due to acquired atrophy of thyroid    5. Statin intolerance and  (no rhabdomyolysis ,no liver function abnormalities)          Plan:     Patient complains of sinus congestion and low-grade fever without shortness of breath.  She has not been exposed to Covid however she was encouraged to stay home and quarantine herself.  She was given Omnicef 300 twice daily.  She was advised to go to the ER or call PCP if she gets worse.    Blood pressure is running low she was advised to discontinue hydrochlorothiazide and discontinue potassium.  She will continue Zetia and Questran.  She was encouraged to get her lab work done after she gets better in 2 to 3 weeks  This visit has been rescheduled as a phone visit to comply with patient safety concerns in accordance with CDC recommendations. Total time of discussion was 20 minutes.          No follow-ups on file.

## 2020-11-23 RX ORDER — POTASSIUM CHLORIDE 750 MG/1
TABLET, FILM COATED, EXTENDED RELEASE ORAL
Qty: 90 TABLET | Refills: 0 | OUTPATIENT
Start: 2020-11-23

## 2020-12-08 RX ORDER — EZETIMIBE 10 MG/1
TABLET ORAL
Qty: 90 TABLET | Refills: 0 | Status: SHIPPED | OUTPATIENT
Start: 2020-12-08 | End: 2021-04-12

## 2020-12-18 RX ORDER — LEVOTHYROXINE SODIUM 0.05 MG/1
TABLET ORAL
Qty: 90 TABLET | Refills: 0 | Status: SHIPPED | OUTPATIENT
Start: 2020-12-18 | End: 2021-03-29

## 2021-01-04 RX ORDER — HYDRALAZINE HYDROCHLORIDE 10 MG/1
TABLET, FILM COATED ORAL
Qty: 360 TABLET | Refills: 0 | Status: SHIPPED | OUTPATIENT
Start: 2021-01-04 | End: 2021-04-12

## 2021-01-22 RX ORDER — OLMESARTAN MEDOXOMIL 20 MG/1
TABLET ORAL
Qty: 60 TABLET | Refills: 2 | Status: SHIPPED | OUTPATIENT
Start: 2021-01-22 | End: 2021-02-24 | Stop reason: DRUGHIGH

## 2021-02-24 ENCOUNTER — OFFICE VISIT (OUTPATIENT)
Dept: CARDIOLOGY | Facility: CLINIC | Age: 71
End: 2021-02-24

## 2021-02-24 VITALS — DIASTOLIC BLOOD PRESSURE: 84 MMHG | SYSTOLIC BLOOD PRESSURE: 133 MMHG | HEART RATE: 73 BPM

## 2021-02-24 DIAGNOSIS — I10 ESSENTIAL HYPERTENSION: Primary | ICD-10-CM

## 2021-02-24 DIAGNOSIS — Z78.9 STATIN INTOLERANCE: ICD-10-CM

## 2021-02-24 DIAGNOSIS — I38 VALVULAR HEART DISEASE: ICD-10-CM

## 2021-02-24 DIAGNOSIS — E03.4 HYPOTHYROIDISM DUE TO ACQUIRED ATROPHY OF THYROID: ICD-10-CM

## 2021-02-24 DIAGNOSIS — E78.2 MIXED HYPERLIPIDEMIA: ICD-10-CM

## 2021-02-24 PROCEDURE — 99443 PR PHYS/QHP TELEPHONE EVALUATION 21-30 MIN: CPT | Performed by: INTERNAL MEDICINE

## 2021-02-24 RX ORDER — OLMESARTAN MEDOXOMIL 40 MG/1
40 TABLET ORAL DAILY
COMMUNITY
Start: 2021-02-18 | End: 2021-05-12

## 2021-02-24 NOTE — PROGRESS NOTES
subjective     Chief Complaint   Patient presents with   • Hypertension     Follow up, pt does have the flu at this time, will get labs soon   • Chest Pain     Follow up.  pt denies any symptoms     History of Present Illness       You have chosen to receive care through a telephone visit. Do you consent to use a telephone visit for your medical care today? Yes    Patient is 70 years old white female who is being evaluated via telephone visit.  Patient sister had COVID-19 and she got exposed to it on .  She complains of mild flulike symptoms but otherwise she is doing very well denies any fever.  Actually she said that she is feeling much better than few days ago.  She is self quarantined.    Blood pressure is very well controlled on Benicar 40 mg daily and hydralazine 10 mg 4 times a day.    She also has hyperlipidemia but cannot take statin she is taking questron and Zetia.  No lab work done in a while.    Hypothyroidism on Synthroid therapy 50 mcg daily which she is taking regularly.  She denies any chest pain but complains of off-and-on palpitation.  She has history of trace aortic regurgitation and moderate mitral regurgitation clinically no evidence of heart failure.    Past Surgical History:   Procedure Laterality Date   • CARDIAC CATHETERIZATION     • CARDIAC CATHETERIZATION  2015   • CARDIOVASCULAR STRESS TEST  2015   •  SECTION      x 3    • ECHO - CONVERTED  2015   • THYROID SURGERY      age 14+ 15, for cysts     Family History   Problem Relation Age of Onset   • Lung cancer Mother    • Stroke Father      Past Medical History:   Diagnosis Date   • Aortic insufficiency    • Asthma    • Disease of thyroid gland    • Essential hypertension    • SOB (shortness of breath)      Patient Active Problem List   Diagnosis   • Essential hypertension   • Hypothyroidism   • Gastroesophageal reflux disease without esophagitis   • Hyperlipidemia   • LBBB (left bundle branch block)   • Valvular  heart disease trace AI, Mod MR   • Hypokalemia   • Chest pain in adult   • Statin intolerance and (no rhabdomyolysis ,no liver function abnormalities)   • Nonobstructive coronary artery disease 2015   • Bradycardia   • Acute URI       Social History     Tobacco Use   • Smoking status: Never Smoker   • Smokeless tobacco: Never Used   Substance Use Topics   • Alcohol use: No   • Drug use: No       Allergies   Allergen Reactions   • Aspirin Anaphylaxis   • Iron Shortness Of Breath   • Losartan Hives, Itching and Swelling   • Clonidine Unknown (See Comments)   • Eggs Or Egg-Derived Products    • Ace Inhibitors Rash   • Albuterol Rash   • Lisinopril Rash       Current Outpatient Medications on File Prior to Visit   Medication Sig   • cetirizine (ZyrTEC) 10 MG tablet Take 10 mg by mouth daily.   • cholestyramine (QUESTRAN) 4 g packet Take 1 packet by mouth 2 (Two) Times a Day.   • ezetimibe (ZETIA) 10 MG tablet TAKE 1 TABLET BY MOUTH ONCE DAILY   • fluticasone-salmeterol (ADVAIR) 250-50 MCG/DOSE DISKUS Inhale 2 (two) times a day.   • hydrALAZINE (APRESOLINE) 10 MG tablet TAKE 1 TABLET BY MOUTH 4 TIMES A DAY   • levothyroxine (SYNTHROID, LEVOTHROID) 50 MCG tablet TAKE 1 TABLET BY MOUTH ONCE DAILY EVERY MORNING ON AN EMPTY STOMACH   • Multiple Vitamin (MULTI VITAMIN DAILY PO) Take  by mouth.   • nitroglycerin (NITROSTAT) 0.4 MG SL tablet    • olmesartan (BENICAR) 40 MG tablet Take 40 mg by mouth Daily.   • [DISCONTINUED] olmesartan (BENICAR) 20 MG tablet TAKE 2 TABLETS BY MOUTH ONCE DAILY   • [DISCONTINUED] cefdinir (OMNICEF) 300 MG capsule Take 1 capsule by mouth 2 (Two) Times a Day.   • [DISCONTINUED] levothyroxine (SYNTHROID, LEVOTHROID) 50 MCG tablet TAKE 1 TABLET EVERY MORNING ON AN EMPTY STOMACH FOR THYROID     No current facility-administered medications on file prior to visit.          The following portions of the patient's history were reviewed and updated as appropriate: allergies, current medications, past  family history, past medical history, past social history, past surgical history and problem list.    Review of Systems   Constitution: Negative.   HENT: Positive for congestion.    Eyes: Negative.    Cardiovascular: Positive for palpitations. Negative for chest pain, cyanosis, dyspnea on exertion, irregular heartbeat, leg swelling, near-syncope, orthopnea, paroxysmal nocturnal dyspnea and syncope.   Respiratory: Negative.  Negative for shortness of breath.    Hematologic/Lymphatic: Negative.    Musculoskeletal: Negative.    Gastrointestinal: Negative.    Neurological: Negative.  Negative for headaches.          Objective:     /84 Comment: verbal per patient  Pulse 73 Comment: verbal per patient  Physical Exam  Telephone visit    Lab Review  Lab Results   Component Value Date     02/19/2020    K 4.3 02/19/2020     02/19/2020    BUN 13 02/19/2020    CREATININE 0.93 02/19/2020    GLUCOSE 83 02/19/2020    CALCIUM 8.9 02/19/2020    ALT 10 02/19/2020    ALKPHOS 119 (H) 02/19/2020    LABIL2 1.5 05/19/2015     Lab Results   Component Value Date    CKTOTAL 75 08/23/2019     Lab Results   Component Value Date    WBC 6.08 02/19/2020    HGB 12.2 02/19/2020    HCT 36.9 02/19/2020     02/19/2020     Lab Results   Component Value Date    INR 0.89 01/17/2017    INR 0.96 07/23/2015     Lab Results   Component Value Date    MG 2.0 07/23/2015     Lab Results   Component Value Date    TSH 2.240 02/19/2020     Lab Results   Component Value Date    BNP 28.0 09/05/2018     Lab Results   Component Value Date    CHLPL 192 05/19/2015    CHOL 200 02/19/2020    TRIG 66 02/19/2020    HDL 78 (H) 02/19/2020    VLDL 13.2 02/19/2020    LDLHDL 1.39 02/19/2020     Lab Results   Component Value Date     (H) 02/19/2020    LDL 97 08/23/2019       Procedures       I personally viewed and interpreted the patient's LAB data         Assessment:     1. Essential hypertension    2. Mixed hyperlipidemia    3. Valvular heart  disease trace AI, Mod MR    4. Hypothyroidism due to acquired atrophy of thyroid    5. Statin intolerance and (no rhabdomyolysis ,no liver function abnormalities)          Plan:     Blood pressure is very well controlled she was advised to continue Benicar and hydralazine.  Patient has hyperlipidemia but is statin intolerant she will continue Questran and Zetia.  Lab work scheduled.  She has valvular heart disease with moderate mitral regurgitation and trace AI clinically no evidence of heart failure.  Hypothyroidism, she was advised to continue Synthroid 50 mcg daily.  Lab work scheduled.  She will have CBC, CMP, lipid panel, thyroid functions including T4 and TSH.  Follow-up scheduled  This visit has been rescheduled as a phone visit to comply with patient safety concerns in accordance with CDC recommendations. Total time of discussion was 21 minutes.          No follow-ups on file.

## 2021-03-29 RX ORDER — LEVOTHYROXINE SODIUM 0.05 MG/1
TABLET ORAL
Qty: 90 TABLET | Refills: 0 | Status: SHIPPED | OUTPATIENT
Start: 2021-03-29 | End: 2021-07-12

## 2021-04-12 RX ORDER — EZETIMIBE 10 MG/1
TABLET ORAL
Qty: 90 TABLET | Refills: 0 | Status: SHIPPED | OUTPATIENT
Start: 2021-04-12 | End: 2021-09-16 | Stop reason: SDUPTHER

## 2021-04-12 RX ORDER — HYDRALAZINE HYDROCHLORIDE 10 MG/1
TABLET, FILM COATED ORAL
Qty: 360 TABLET | Refills: 0 | Status: SHIPPED | OUTPATIENT
Start: 2021-04-12 | End: 2021-06-17 | Stop reason: SDUPTHER

## 2021-05-12 RX ORDER — OLMESARTAN MEDOXOMIL 40 MG/1
TABLET ORAL
Qty: 30 TABLET | Refills: 0 | Status: SHIPPED | OUTPATIENT
Start: 2021-05-12 | End: 2021-06-24

## 2021-05-18 ENCOUNTER — LAB (OUTPATIENT)
Dept: LAB | Facility: HOSPITAL | Age: 71
End: 2021-05-18

## 2021-05-18 DIAGNOSIS — E03.4 HYPOTHYROIDISM DUE TO ACQUIRED ATROPHY OF THYROID: ICD-10-CM

## 2021-05-18 DIAGNOSIS — E78.2 MIXED HYPERLIPIDEMIA: ICD-10-CM

## 2021-05-18 LAB
ALBUMIN SERPL-MCNC: 4 G/DL (ref 3.5–5.2)
ALBUMIN/GLOB SERPL: 1.3 G/DL
ALP SERPL-CCNC: 125 U/L (ref 39–117)
ALT SERPL W P-5'-P-CCNC: 9 U/L (ref 1–33)
ANION GAP SERPL CALCULATED.3IONS-SCNC: 11.1 MMOL/L (ref 5–15)
AST SERPL-CCNC: 17 U/L (ref 1–32)
BASOPHILS # BLD AUTO: 0.07 10*3/MM3 (ref 0–0.2)
BASOPHILS NFR BLD AUTO: 0.9 % (ref 0–1.5)
BILIRUB SERPL-MCNC: 0.3 MG/DL (ref 0–1.2)
BUN SERPL-MCNC: 12 MG/DL (ref 8–23)
BUN/CREAT SERPL: 12.9 (ref 7–25)
CALCIUM SPEC-SCNC: 8.9 MG/DL (ref 8.6–10.5)
CHLORIDE SERPL-SCNC: 108 MMOL/L (ref 98–107)
CHOLEST SERPL-MCNC: 170 MG/DL (ref 0–200)
CK SERPL-CCNC: 131 U/L (ref 20–180)
CO2 SERPL-SCNC: 22.9 MMOL/L (ref 22–29)
CREAT SERPL-MCNC: 0.93 MG/DL (ref 0.57–1)
DEPRECATED RDW RBC AUTO: 47 FL (ref 37–54)
EOSINOPHIL # BLD AUTO: 0.34 10*3/MM3 (ref 0–0.4)
EOSINOPHIL NFR BLD AUTO: 4.4 % (ref 0.3–6.2)
ERYTHROCYTE [DISTWIDTH] IN BLOOD BY AUTOMATED COUNT: 15.5 % (ref 12.3–15.4)
GFR SERPL CREATININE-BSD FRML MDRD: 60 ML/MIN/1.73
GLOBULIN UR ELPH-MCNC: 3.2 GM/DL
GLUCOSE SERPL-MCNC: 82 MG/DL (ref 65–99)
HCT VFR BLD AUTO: 38.4 % (ref 34–46.6)
HDLC SERPL-MCNC: 75 MG/DL (ref 40–60)
HGB BLD-MCNC: 12.2 G/DL (ref 12–15.9)
IMM GRANULOCYTES # BLD AUTO: 0.02 10*3/MM3 (ref 0–0.05)
IMM GRANULOCYTES NFR BLD AUTO: 0.3 % (ref 0–0.5)
LDLC SERPL CALC-MCNC: 79 MG/DL (ref 0–100)
LDLC/HDLC SERPL: 1.04 {RATIO}
LYMPHOCYTES # BLD AUTO: 2.81 10*3/MM3 (ref 0.7–3.1)
LYMPHOCYTES NFR BLD AUTO: 36.4 % (ref 19.6–45.3)
MCH RBC QN AUTO: 26.9 PG (ref 26.6–33)
MCHC RBC AUTO-ENTMCNC: 31.8 G/DL (ref 31.5–35.7)
MCV RBC AUTO: 84.6 FL (ref 79–97)
MONOCYTES # BLD AUTO: 0.53 10*3/MM3 (ref 0.1–0.9)
MONOCYTES NFR BLD AUTO: 6.9 % (ref 5–12)
NEUTROPHILS NFR BLD AUTO: 3.94 10*3/MM3 (ref 1.7–7)
NEUTROPHILS NFR BLD AUTO: 51.1 % (ref 42.7–76)
NRBC BLD AUTO-RTO: 0 /100 WBC (ref 0–0.2)
PLATELET # BLD AUTO: 214 10*3/MM3 (ref 140–450)
PMV BLD AUTO: 14 FL (ref 6–12)
POTASSIUM SERPL-SCNC: 4.5 MMOL/L (ref 3.5–5.2)
PROT SERPL-MCNC: 7.2 G/DL (ref 6–8.5)
RBC # BLD AUTO: 4.54 10*6/MM3 (ref 3.77–5.28)
SODIUM SERPL-SCNC: 142 MMOL/L (ref 136–145)
T4 FREE SERPL-MCNC: 1.3 NG/DL (ref 0.93–1.7)
TRIGL SERPL-MCNC: 86 MG/DL (ref 0–150)
TSH SERPL DL<=0.05 MIU/L-ACNC: 2.62 UIU/ML (ref 0.27–4.2)
VLDLC SERPL-MCNC: 16 MG/DL (ref 5–40)
WBC # BLD AUTO: 7.71 10*3/MM3 (ref 3.4–10.8)

## 2021-05-18 PROCEDURE — 82550 ASSAY OF CK (CPK): CPT

## 2021-05-18 PROCEDURE — 85025 COMPLETE CBC W/AUTO DIFF WBC: CPT

## 2021-05-18 PROCEDURE — 84439 ASSAY OF FREE THYROXINE: CPT

## 2021-05-18 PROCEDURE — 36415 COLL VENOUS BLD VENIPUNCTURE: CPT

## 2021-05-18 PROCEDURE — 80053 COMPREHEN METABOLIC PANEL: CPT

## 2021-05-18 PROCEDURE — 84443 ASSAY THYROID STIM HORMONE: CPT

## 2021-05-18 PROCEDURE — 80061 LIPID PANEL: CPT

## 2021-06-17 ENCOUNTER — OFFICE VISIT (OUTPATIENT)
Dept: CARDIOLOGY | Facility: CLINIC | Age: 71
End: 2021-06-17

## 2021-06-17 VITALS
HEART RATE: 86 BPM | OXYGEN SATURATION: 98 % | HEIGHT: 60 IN | DIASTOLIC BLOOD PRESSURE: 86 MMHG | WEIGHT: 150.8 LBS | BODY MASS INDEX: 29.61 KG/M2 | TEMPERATURE: 97.3 F | SYSTOLIC BLOOD PRESSURE: 146 MMHG

## 2021-06-17 DIAGNOSIS — I44.7 LBBB (LEFT BUNDLE BRANCH BLOCK): ICD-10-CM

## 2021-06-17 DIAGNOSIS — I38 VALVULAR HEART DISEASE: ICD-10-CM

## 2021-06-17 DIAGNOSIS — E78.2 MIXED HYPERLIPIDEMIA: ICD-10-CM

## 2021-06-17 DIAGNOSIS — R07.9 CHEST PAIN IN ADULT: ICD-10-CM

## 2021-06-17 DIAGNOSIS — I10 ESSENTIAL HYPERTENSION: Primary | ICD-10-CM

## 2021-06-17 DIAGNOSIS — Z78.9 STATIN INTOLERANCE: ICD-10-CM

## 2021-06-17 DIAGNOSIS — I25.10 CORONARY ARTERY DISEASE INVOLVING NATIVE CORONARY ARTERY OF NATIVE HEART WITHOUT ANGINA PECTORIS: ICD-10-CM

## 2021-06-17 PROCEDURE — 99214 OFFICE O/P EST MOD 30 MIN: CPT | Performed by: INTERNAL MEDICINE

## 2021-06-17 RX ORDER — HYDRALAZINE HYDROCHLORIDE 10 MG/1
20 TABLET, FILM COATED ORAL 3 TIMES DAILY
Qty: 360 TABLET | Refills: 0 | Status: SHIPPED | OUTPATIENT
Start: 2021-06-17 | End: 2021-09-16 | Stop reason: SDUPTHER

## 2021-06-17 NOTE — PROGRESS NOTES
subjective     Chief Complaint   Patient presents with   • Hypertension     FOLLOW UP     History of Present Illness  Patient is 71 years old white female who has history of hypertension which has been difficult to control she also has moderate mitral regurgitation mild aortic regurgitation and left bundle branch block.  She denies any chest pain palpitations or shortness of breath.  Blood pressure however has been running slightly high.      She is taking Benicar 40 mg daily and hydralazine 10 mg  3 times a day.    Hypothyroidism on Synthroid 50 mcg daily.    She is statin intolerant and is taking Zetia and Questran for hyperlipidemia.    Past Surgical History:   Procedure Laterality Date   • CARDIAC CATHETERIZATION     • CARDIAC CATHETERIZATION  2015   • CARDIOVASCULAR STRESS TEST  2015   •  SECTION      x 3    • ECHO - CONVERTED  2015   • THYROID SURGERY      age 14+ 15, for cysts     Family History   Problem Relation Age of Onset   • Lung cancer Mother    • Stroke Father      Past Medical History:   Diagnosis Date   • Aortic insufficiency    • Asthma    • Disease of thyroid gland    • Essential hypertension    • SOB (shortness of breath)      Patient Active Problem List   Diagnosis   • Essential hypertension   • Hypothyroidism   • Gastroesophageal reflux disease without esophagitis   • Hyperlipidemia   • LBBB (left bundle branch block)   • Valvular heart disease trace AI, Mod MR   • Hypokalemia   • Chest pain in adult   • Statin intolerance and (no rhabdomyolysis ,no liver function abnormalities)   • Nonobstructive coronary artery disease    • Bradycardia   • Acute URI       Social History     Tobacco Use   • Smoking status: Never Smoker   • Smokeless tobacco: Never Used   Substance Use Topics   • Alcohol use: No   • Drug use: No       Allergies   Allergen Reactions   • Aspirin Anaphylaxis   • Iron Shortness Of Breath   • Losartan Hives, Itching and Swelling   • Clonidine Unknown (See  "Comments)   • Eggs Or Egg-Derived Products    • Ace Inhibitors Rash   • Albuterol Rash   • Lisinopril Rash       Current Outpatient Medications on File Prior to Visit   Medication Sig   • cetirizine (ZyrTEC) 10 MG tablet Take 10 mg by mouth daily.   • cholestyramine (QUESTRAN) 4 g packet Take 1 packet by mouth 2 (Two) Times a Day.   • ezetimibe (ZETIA) 10 MG tablet TAKE 1 TABLET BY MOUTH ONCE DAILY   • fluticasone-salmeterol (ADVAIR) 250-50 MCG/DOSE DISKUS Inhale 2 (two) times a day.   • levothyroxine (SYNTHROID, LEVOTHROID) 50 MCG tablet TAKE 1 TABLET BY MOUTH ONCE DAILY EVERY MORNING ON AN EMPTY STOMACH   • Multiple Vitamin (MULTI VITAMIN DAILY PO) Take  by mouth.   • nitroglycerin (NITROSTAT) 0.4 MG SL tablet    • olmesartan (BENICAR) 40 MG tablet TAKE 1 TABLET BY MOUTH ONCE DAILY   • [DISCONTINUED] levothyroxine (SYNTHROID, LEVOTHROID) 50 MCG tablet TAKE 1 TABLET EVERY MORNING ON AN EMPTY STOMACH FOR THYROID     No current facility-administered medications on file prior to visit.         The following portions of the patient's history were reviewed and updated as appropriate: allergies, current medications, past family history, past medical history, past social history, past surgical history and problem list.    Review of Systems   Constitutional: Negative.   HENT: Negative.  Negative for congestion.    Eyes: Negative.    Cardiovascular: Negative.  Negative for chest pain, cyanosis, dyspnea on exertion, irregular heartbeat, leg swelling, near-syncope, orthopnea, palpitations, paroxysmal nocturnal dyspnea and syncope.   Respiratory: Negative.  Negative for shortness of breath.    Hematologic/Lymphatic: Negative.    Musculoskeletal: Negative.    Gastrointestinal: Negative.    Neurological: Negative.  Negative for headaches.          Objective:     /86 (BP Location: Left arm, Patient Position: Sitting, Cuff Size: Adult)   Pulse 86   Temp 97.3 °F (36.3 °C) (Infrared)   Ht 152.4 cm (60\")   Wt 68.4 kg (150 " lb 12.8 oz)   SpO2 98%   BMI 29.45 kg/m²   Pulmonary:      Effort: Pulmonary effort is normal.      Breath sounds: Normal breath sounds. No stridor. No wheezing. No rhonchi. No rales.   Cardiovascular:      PMI at left midclavicular line. Normal rate. Regular rhythm. Normal S1. Normal S2.      Murmurs: There is no murmur.      No gallop. No click. No rub.   Pulses:     Intact distal pulses.   Edema:     Peripheral edema absent.           Lab Review  Lab Results   Component Value Date     05/18/2021    K 4.5 05/18/2021     (H) 05/18/2021    BUN 12 05/18/2021    CREATININE 0.93 05/18/2021    GLUCOSE 82 05/18/2021    CALCIUM 8.9 05/18/2021    ALT 9 05/18/2021    ALKPHOS 125 (H) 05/18/2021    LABIL2 1.5 05/19/2015     Lab Results   Component Value Date    CKTOTAL 131 05/18/2021     Lab Results   Component Value Date    WBC 7.71 05/18/2021    HGB 12.2 05/18/2021    HCT 38.4 05/18/2021     05/18/2021     Lab Results   Component Value Date    INR 0.89 01/17/2017    INR 0.96 07/23/2015     Lab Results   Component Value Date    MG 2.0 07/23/2015     Lab Results   Component Value Date    TSH 2.620 05/18/2021     Lab Results   Component Value Date    BNP 28.0 09/05/2018     Lab Results   Component Value Date    CHLPL 192 05/19/2015    CHOL 170 05/18/2021    TRIG 86 05/18/2021    HDL 75 (H) 05/18/2021    VLDL 16 05/18/2021    LDLHDL 1.04 05/18/2021     Lab Results   Component Value Date    LDL 79 05/18/2021     (H) 02/19/2020         ECG 12 Lead    Date/Time: 6/18/2021 12:36 PM  Performed by: John Banda MD  Authorized by: John Banda MD   Comparison: compared with previous ECG from 9/11/2019  Similar to previous ECG  Rhythm: sinus rhythm  Rate: normal  BPM: 77  Conduction: left bundle branch block  QRS axis: left  Other findings: non-specific ST-T wave changes    Clinical impression: abnormal EKG               I personally viewed and interpreted the patient's LAB data          Assessment:     1. Essential hypertension    2. LBBB (left bundle branch block)    3. Mixed hyperlipidemia    4. Valvular heart disease trace AI, Mod MR    5. Chest pain in adult    6. Statin intolerance and (no rhabdomyolysis ,no liver function abnormalities)    7. Nonobstructive coronary artery disease 2015          Plan:     Lab work reviewed and discussed with the patient  Lipid panel shows total cholesterol of 170 with a triglyceride of 86 LDL 79 and HDL 75.  Significant improvement he was advised to continue Zetia and Questran.    Blood pressure is elevated she was advised to increase hydralazine 20 mg 3 times daily  She will continue Benicar 40 mg daily    Thyroid functions are normal Synthroid 50 mcg daily was continued.    Healthy lifestyle emphasized follow-up scheduled        No follow-ups on file.

## 2021-06-18 PROCEDURE — 93000 ELECTROCARDIOGRAM COMPLETE: CPT | Performed by: INTERNAL MEDICINE

## 2021-06-24 RX ORDER — OLMESARTAN MEDOXOMIL 40 MG/1
TABLET ORAL
Qty: 30 TABLET | Refills: 0 | Status: SHIPPED | OUTPATIENT
Start: 2021-06-24 | End: 2021-07-26

## 2021-07-12 RX ORDER — LEVOTHYROXINE SODIUM 0.05 MG/1
TABLET ORAL
Qty: 90 TABLET | Refills: 0 | Status: SHIPPED | OUTPATIENT
Start: 2021-07-12 | End: 2021-09-16 | Stop reason: SDUPTHER

## 2021-07-26 RX ORDER — OLMESARTAN MEDOXOMIL 40 MG/1
TABLET ORAL
Qty: 60 TABLET | Refills: 0 | Status: SHIPPED | OUTPATIENT
Start: 2021-07-26 | End: 2021-09-16 | Stop reason: SDUPTHER

## 2021-09-16 ENCOUNTER — OFFICE VISIT (OUTPATIENT)
Dept: CARDIOLOGY | Facility: CLINIC | Age: 71
End: 2021-09-16

## 2021-09-16 VITALS — SYSTOLIC BLOOD PRESSURE: 127 MMHG | HEART RATE: 82 BPM | DIASTOLIC BLOOD PRESSURE: 86 MMHG

## 2021-09-16 DIAGNOSIS — I10 ESSENTIAL HYPERTENSION: Primary | ICD-10-CM

## 2021-09-16 DIAGNOSIS — Z78.9 STATIN INTOLERANCE: ICD-10-CM

## 2021-09-16 DIAGNOSIS — E78.2 MIXED HYPERLIPIDEMIA: ICD-10-CM

## 2021-09-16 DIAGNOSIS — E03.4 HYPOTHYROIDISM DUE TO ACQUIRED ATROPHY OF THYROID: ICD-10-CM

## 2021-09-16 DIAGNOSIS — I38 VALVULAR HEART DISEASE: ICD-10-CM

## 2021-09-16 PROCEDURE — 99443 PR PHYS/QHP TELEPHONE EVALUATION 21-30 MIN: CPT | Performed by: INTERNAL MEDICINE

## 2021-09-16 RX ORDER — CHOLESTYRAMINE 4 G/9G
1 POWDER, FOR SUSPENSION ORAL 2 TIMES DAILY
Qty: 180 PACKET | Refills: 3 | Status: SHIPPED | OUTPATIENT
Start: 2021-09-16

## 2021-09-16 RX ORDER — LEVOTHYROXINE SODIUM 0.05 MG/1
50 TABLET ORAL
Qty: 30 TABLET | Refills: 3 | Status: SHIPPED | OUTPATIENT
Start: 2021-09-16 | End: 2021-12-16 | Stop reason: SDUPTHER

## 2021-09-16 RX ORDER — EZETIMIBE 10 MG/1
10 TABLET ORAL DAILY
Qty: 90 TABLET | Refills: 1 | Status: SHIPPED | OUTPATIENT
Start: 2021-09-16 | End: 2022-04-11

## 2021-09-16 RX ORDER — OLMESARTAN MEDOXOMIL 40 MG/1
40 TABLET ORAL DAILY
Qty: 30 TABLET | Refills: 3 | Status: SHIPPED | OUTPATIENT
Start: 2021-09-16 | End: 2021-12-16 | Stop reason: SDUPTHER

## 2021-09-16 RX ORDER — HYDRALAZINE HYDROCHLORIDE 10 MG/1
20 TABLET, FILM COATED ORAL 3 TIMES DAILY
Qty: 360 TABLET | Refills: 0 | Status: SHIPPED | OUTPATIENT
Start: 2021-09-16 | End: 2021-12-16 | Stop reason: SDUPTHER

## 2021-09-16 NOTE — PROGRESS NOTES
subjective     Chief Complaint   Patient presents with   • Hypertension     Follow up   • Fatigue     pt awaiting a covid test result on her grandson  was exposed   • Med Refill     pending         History of Present Illness You have chosen to receive care through a telephone visit. Do you consent to use a telephone visit for your medical care today? Yes    Patient is 71 years old white female who is being evaluated via telephone visit because of family exposure to Covid.  She herself is totally asymptomatic.  Her medical problems consist of hypertension, hyperlipidemia and hypothyroidism.    Blood pressures have been very well controlled on current medications  She is taking hydralazine 20 mg 3 times daily and Benicar 40 mg daily.    She also has hypothyroidism and is taking Synthroid 50 mcg daily.  Denies any palpitations.    Hyperlipidemia with statin intolerance currently on questron and Zetia and is doing very well.  Lab work will be scheduled.    Past Surgical History:   Procedure Laterality Date   • CARDIAC CATHETERIZATION     • CARDIAC CATHETERIZATION  2015   • CARDIOVASCULAR STRESS TEST  2015   •  SECTION      x 3    • ECHO - CONVERTED  2015   • THYROID SURGERY      age 14+ 15, for cysts     Family History   Problem Relation Age of Onset   • Lung cancer Mother    • Stroke Father      Past Medical History:   Diagnosis Date   • Aortic insufficiency    • Asthma    • Disease of thyroid gland    • Essential hypertension    • SOB (shortness of breath)      Patient Active Problem List   Diagnosis   • Essential hypertension   • Hypothyroidism   • Gastroesophageal reflux disease without esophagitis   • Hyperlipidemia   • LBBB (left bundle branch block)   • Valvular heart disease trace AI, Mod MR   • Hypokalemia   • Chest pain in adult   • Statin intolerance and (no rhabdomyolysis ,no liver function abnormalities)   • Nonobstructive coronary artery disease    • Bradycardia   • Acute URI        Social History     Tobacco Use   • Smoking status: Never Smoker   • Smokeless tobacco: Never Used   Substance Use Topics   • Alcohol use: No   • Drug use: No       Allergies   Allergen Reactions   • Aspirin Anaphylaxis   • Iron Shortness Of Breath   • Losartan Hives, Itching and Swelling   • Clonidine Unknown (See Comments)   • Eggs Or Egg-Derived Products    • Ace Inhibitors Rash   • Albuterol Rash   • Lisinopril Rash       Current Outpatient Medications on File Prior to Visit   Medication Sig   • cetirizine (ZyrTEC) 10 MG tablet Take 10 mg by mouth daily.   • fluticasone-salmeterol (ADVAIR) 250-50 MCG/DOSE DISKUS Inhale 2 (two) times a day.   • Multiple Vitamin (MULTI VITAMIN DAILY PO) Take  by mouth.   • nitroglycerin (NITROSTAT) 0.4 MG SL tablet    • [DISCONTINUED] cholestyramine (QUESTRAN) 4 g packet Take 1 packet by mouth 2 (Two) Times a Day.   • [DISCONTINUED] ezetimibe (ZETIA) 10 MG tablet TAKE 1 TABLET BY MOUTH ONCE DAILY   • [DISCONTINUED] hydrALAZINE (APRESOLINE) 10 MG tablet Take 2 tablets by mouth 3 (Three) Times a Day.   • [DISCONTINUED] levothyroxine (SYNTHROID, LEVOTHROID) 50 MCG tablet TAKE 1 TABLET BY MOUTH ONCE DAILY EVERY MORNING ON AN EMPTY STOMACH   • [DISCONTINUED] olmesartan (BENICAR) 40 MG tablet TAKE 1 TABLET BY MOUTH ONCE DAILY   • [DISCONTINUED] levothyroxine (SYNTHROID, LEVOTHROID) 50 MCG tablet TAKE 1 TABLET EVERY MORNING ON AN EMPTY STOMACH FOR THYROID     No current facility-administered medications on file prior to visit.         The following portions of the patient's history were reviewed and updated as appropriate: allergies, current medications, past family history, past medical history, past social history, past surgical history and problem list.    Review of Systems   Constitutional: Negative.   HENT: Negative.  Negative for congestion.    Eyes: Negative.    Cardiovascular: Negative.  Negative for chest pain, cyanosis, dyspnea on exertion, irregular heartbeat, leg  swelling, near-syncope, orthopnea, palpitations, paroxysmal nocturnal dyspnea and syncope.   Respiratory: Negative.  Negative for shortness of breath.    Hematologic/Lymphatic: Negative.    Musculoskeletal: Negative.    Gastrointestinal: Negative.    Neurological: Negative.  Negative for headaches.          Objective:     /86 Comment: verbal per patient  Pulse 82 Comment: verbal per patient  Physical Exam      Lab Review  Lab Results   Component Value Date     05/18/2021    K 4.5 05/18/2021     (H) 05/18/2021    BUN 12 05/18/2021    CREATININE 0.93 05/18/2021    GLUCOSE 82 05/18/2021    CALCIUM 8.9 05/18/2021    ALT 9 05/18/2021    ALKPHOS 125 (H) 05/18/2021    LABIL2 1.5 05/19/2015     Lab Results   Component Value Date    CKTOTAL 131 05/18/2021     Lab Results   Component Value Date    WBC 7.71 05/18/2021    HGB 12.2 05/18/2021    HCT 38.4 05/18/2021     05/18/2021     Lab Results   Component Value Date    INR 0.89 01/17/2017    INR 0.96 07/23/2015     Lab Results   Component Value Date    MG 2.0 07/23/2015     Lab Results   Component Value Date    TSH 2.620 05/18/2021     Lab Results   Component Value Date    BNP 28.0 09/05/2018     Lab Results   Component Value Date    CHLPL 192 05/19/2015    CHOL 170 05/18/2021    TRIG 86 05/18/2021    HDL 75 (H) 05/18/2021    VLDL 16 05/18/2021    LDLHDL 1.04 05/18/2021     Lab Results   Component Value Date    LDL 79 05/18/2021     (H) 02/19/2020       Procedures       I personally viewed and interpreted the patient's LAB data         Assessment:     1. Essential hypertension    2. Mixed hyperlipidemia    3. Valvular heart disease trace AI, Mod MR    4. Hypothyroidism due to acquired atrophy of thyroid    5. Statin intolerance and (no rhabdomyolysis ,no liver function abnormalities)          Plan:     Essential hypertension  Blood pressure is very well controlled she was advised to continue hydralazine and Benicar.  Refills were  given.    Hyperlipidemia with statin intolerance she will continue questron and Zetia, refills were sent.    Hypothyroidism  Synthroid 50 mcg was continued.    Patient also has history of aortic regurgitation and MR.  She is asymptomatic.  Lab work including CBC, CMP, lipid panel, thyroid function scheduled.  This visit has been rescheduled as a phone visit to comply with patient safety concerns in accordance with CDC recommendations. Total time of discussion was 24 minutes.          No follow-ups on file.

## 2021-12-16 ENCOUNTER — OFFICE VISIT (OUTPATIENT)
Dept: CARDIOLOGY | Facility: CLINIC | Age: 71
End: 2021-12-16

## 2021-12-16 VITALS
DIASTOLIC BLOOD PRESSURE: 90 MMHG | BODY MASS INDEX: 27.48 KG/M2 | SYSTOLIC BLOOD PRESSURE: 150 MMHG | WEIGHT: 140 LBS | TEMPERATURE: 97.8 F | HEIGHT: 60 IN | OXYGEN SATURATION: 98 % | HEART RATE: 72 BPM

## 2021-12-16 DIAGNOSIS — I38 VALVULAR HEART DISEASE: ICD-10-CM

## 2021-12-16 DIAGNOSIS — E78.2 MIXED HYPERLIPIDEMIA: ICD-10-CM

## 2021-12-16 DIAGNOSIS — I10 ESSENTIAL HYPERTENSION: Primary | ICD-10-CM

## 2021-12-16 DIAGNOSIS — E03.4 HYPOTHYROIDISM DUE TO ACQUIRED ATROPHY OF THYROID: ICD-10-CM

## 2021-12-16 DIAGNOSIS — I44.7 LBBB (LEFT BUNDLE BRANCH BLOCK): ICD-10-CM

## 2021-12-16 PROCEDURE — 99214 OFFICE O/P EST MOD 30 MIN: CPT | Performed by: INTERNAL MEDICINE

## 2021-12-16 RX ORDER — OLMESARTAN MEDOXOMIL 40 MG/1
40 TABLET ORAL DAILY
Qty: 30 TABLET | Refills: 3 | Status: SHIPPED | OUTPATIENT
Start: 2021-12-16 | End: 2022-04-13

## 2021-12-16 RX ORDER — LEVOTHYROXINE SODIUM 0.05 MG/1
50 TABLET ORAL
Qty: 30 TABLET | Refills: 3 | Status: SHIPPED | OUTPATIENT
Start: 2021-12-16 | End: 2022-03-04

## 2021-12-16 RX ORDER — HYDRALAZINE HYDROCHLORIDE 10 MG/1
20 TABLET, FILM COATED ORAL 3 TIMES DAILY
Qty: 360 TABLET | Refills: 0 | Status: CANCELLED | OUTPATIENT
Start: 2021-12-16

## 2021-12-16 RX ORDER — HYDRALAZINE HYDROCHLORIDE 25 MG/1
25 TABLET, FILM COATED ORAL 3 TIMES DAILY
Qty: 270 TABLET | Refills: 1 | Status: SHIPPED | OUTPATIENT
Start: 2021-12-16 | End: 2022-06-13

## 2021-12-16 NOTE — PROGRESS NOTES
subjective     Chief Complaint   Patient presents with   • Hypertension     today  pt states she has been anxious due to  illness   • Hyperlipidemia     Follow up     • Med Refill     pending     History of Present Illness  Patient is 71 years old white female who is here for cardiology follow-up.    Hypertension  She states that she has been quite worried about her 's health that made her blood pressure go high.  She thinks her blood pressure is not too bad most of the times.  She denies any headaches or dizziness.  She is taking hydralazine 20 mg 3 times a day and Benicar 40 mg daily.    Hypothyroidism  She is taking Synthroid 50 mcg daily.  She is clinically euthyroid but she did not have her lab work done as ordered.    Hyperlipidemia  Patient is taking questron and Zetia.  She is statin intolerant    She denies any chest pain palpitations or shortness of breath.  She has moderate mitral regurgitation and chronic left bundle branch block by history        Past Surgical History:   Procedure Laterality Date   • CARDIAC CATHETERIZATION     • CARDIAC CATHETERIZATION  2015   • CARDIOVASCULAR STRESS TEST  2015   •  SECTION      x 3    • ECHO - CONVERTED  2015   • THYROID SURGERY      age 14+ 15, for cysts     Family History   Problem Relation Age of Onset   • Lung cancer Mother    • Stroke Father      Past Medical History:   Diagnosis Date   • Aortic insufficiency    • Asthma    • Disease of thyroid gland    • Essential hypertension    • SOB (shortness of breath)      Patient Active Problem List   Diagnosis   • Essential hypertension   • Hypothyroidism   • Gastroesophageal reflux disease without esophagitis   • Hyperlipidemia   • LBBB (left bundle branch block)   • Valvular heart disease trace AI, Mod MR   • Hypokalemia   • Chest pain in adult   • Statin intolerance and (no rhabdomyolysis ,no liver function abnormalities)   • Nonobstructive coronary artery disease    • Bradycardia    • Acute URI       Social History     Tobacco Use   • Smoking status: Never Smoker   • Smokeless tobacco: Never Used   Substance Use Topics   • Alcohol use: No   • Drug use: No       Allergies   Allergen Reactions   • Aspirin Anaphylaxis   • Iron Shortness Of Breath   • Losartan Hives, Itching and Swelling   • Clonidine Unknown (See Comments)   • Eggs Or Egg-Derived Products    • Ace Inhibitors Rash   • Albuterol Rash   • Lisinopril Rash       Current Outpatient Medications on File Prior to Visit   Medication Sig   • cetirizine (ZyrTEC) 10 MG tablet Take 10 mg by mouth daily.   • cholestyramine (QUESTRAN) 4 g packet Take 1 packet by mouth 2 (Two) Times a Day.   • ezetimibe (ZETIA) 10 MG tablet Take 1 tablet by mouth Daily.   • fluticasone-salmeterol (ADVAIR) 250-50 MCG/DOSE DISKUS Inhale 2 (two) times a day.   • Multiple Vitamin (MULTI VITAMIN DAILY PO) Take  by mouth.   • nitroglycerin (NITROSTAT) 0.4 MG SL tablet    • [DISCONTINUED] hydrALAZINE (APRESOLINE) 10 MG tablet Take 2 tablets by mouth 3 (Three) Times a Day.   • [DISCONTINUED] levothyroxine (SYNTHROID, LEVOTHROID) 50 MCG tablet Take 1 tablet by mouth Every Morning.   • [DISCONTINUED] olmesartan (BENICAR) 40 MG tablet Take 1 tablet by mouth Daily.     No current facility-administered medications on file prior to visit.         The following portions of the patient's history were reviewed and updated as appropriate: allergies, current medications, past family history, past medical history, past social history, past surgical history and problem list.    Review of Systems   Constitutional: Negative.   HENT: Negative.  Negative for congestion.    Eyes: Negative.    Cardiovascular: Negative.  Negative for chest pain, cyanosis, dyspnea on exertion, irregular heartbeat, leg swelling, near-syncope, orthopnea, palpitations, paroxysmal nocturnal dyspnea and syncope.   Respiratory: Negative.  Negative for shortness of breath.    Hematologic/Lymphatic: Negative.   "  Musculoskeletal: Negative.    Gastrointestinal: Negative.    Neurological: Negative.  Negative for headaches.          Objective:     /90   Pulse 72   Temp 97.8 °F (36.6 °C)   Ht 152.4 cm (60\")   Wt 63.5 kg (140 lb)   SpO2 98%   BMI 27.34 kg/m²   Pulmonary:      Effort: Pulmonary effort is normal.      Breath sounds: Normal breath sounds. No stridor. No wheezing. No rhonchi. No rales.   Cardiovascular:      PMI at left midclavicular line. Normal rate. Regular rhythm. Normal S1. Normal S2.      Murmurs: There is no murmur.      No gallop. No click. No rub.   Pulses:     Intact distal pulses.   Edema:     Peripheral edema absent.           Lab Review  Lab Results   Component Value Date     05/18/2021    K 4.5 05/18/2021     (H) 05/18/2021    BUN 12 05/18/2021    CREATININE 0.93 05/18/2021    GLUCOSE 82 05/18/2021    CALCIUM 8.9 05/18/2021    ALT 9 05/18/2021    ALKPHOS 125 (H) 05/18/2021    LABIL2 1.5 05/19/2015     Lab Results   Component Value Date    CKTOTAL 131 05/18/2021     Lab Results   Component Value Date    WBC 7.71 05/18/2021    HGB 12.2 05/18/2021    HCT 38.4 05/18/2021     05/18/2021     Lab Results   Component Value Date    INR 0.89 01/17/2017    INR 0.96 07/23/2015     Lab Results   Component Value Date    MG 2.0 07/23/2015     Lab Results   Component Value Date    TSH 2.620 05/18/2021     Lab Results   Component Value Date    BNP 28.0 09/05/2018     Lab Results   Component Value Date    CHLPL 192 05/19/2015    CHOL 170 05/18/2021    TRIG 86 05/18/2021    HDL 75 (H) 05/18/2021    VLDL 16 05/18/2021    LDLHDL 1.04 05/18/2021     Lab Results   Component Value Date    LDL 79 05/18/2021     (H) 02/19/2020       Procedures       I personally viewed and interpreted the patient's LAB data         Assessment:     1. Essential hypertension    2. Mixed hyperlipidemia    3. Valvular heart disease trace AI, Mod MR    4. LBBB (left bundle branch block)    5. Hypothyroidism due " to acquired atrophy of thyroid          Plan:     Blood pressure is elevated.  She was advised to increase lisinopril 25 mg 3 times a day and continue Benicar 40 mg daily.  Healthy lifestyle emphasized salt restriction discussed.    Hyperlipidemia last LDL was 79.  She was advised to continue questron and Zetia and have lab work done.  She was supposed to have lab work done.    Hypothyroidism.  Clinically euthyroid Synthroid 50 mcg daily was continued.  Lab work scheduled.  She also has moderate mitral regurgitation and trivial AI.  Clinically no evidence of heart failure.  She is asymptomatic.  Lab work scheduled .  Refills of medications given.        No follow-ups on file.

## 2022-03-04 RX ORDER — LEVOTHYROXINE SODIUM 0.05 MG/1
TABLET ORAL
Qty: 30 TABLET | Refills: 3 | Status: SHIPPED | OUTPATIENT
Start: 2022-03-04 | End: 2022-09-26

## 2022-03-16 ENCOUNTER — OFFICE VISIT (OUTPATIENT)
Dept: CARDIOLOGY | Facility: CLINIC | Age: 72
End: 2022-03-16

## 2022-03-16 VITALS
BODY MASS INDEX: 27.48 KG/M2 | OXYGEN SATURATION: 99 % | HEART RATE: 89 BPM | SYSTOLIC BLOOD PRESSURE: 158 MMHG | TEMPERATURE: 96.9 F | HEIGHT: 60 IN | DIASTOLIC BLOOD PRESSURE: 90 MMHG | WEIGHT: 140 LBS

## 2022-03-16 DIAGNOSIS — I10 ESSENTIAL HYPERTENSION: Primary | ICD-10-CM

## 2022-03-16 DIAGNOSIS — I38 VALVULAR HEART DISEASE: ICD-10-CM

## 2022-03-16 DIAGNOSIS — I44.7 LBBB (LEFT BUNDLE BRANCH BLOCK): ICD-10-CM

## 2022-03-16 DIAGNOSIS — E03.9 HYPOTHYROIDISM (ACQUIRED): ICD-10-CM

## 2022-03-16 DIAGNOSIS — E78.2 MIXED HYPERLIPIDEMIA: ICD-10-CM

## 2022-03-16 PROCEDURE — 99214 OFFICE O/P EST MOD 30 MIN: CPT | Performed by: INTERNAL MEDICINE

## 2022-03-16 RX ORDER — AMLODIPINE BESYLATE 5 MG/1
5 TABLET ORAL DAILY
Qty: 90 TABLET | Refills: 2 | Status: SHIPPED | OUTPATIENT
Start: 2022-03-16 | End: 2022-12-12

## 2022-03-16 NOTE — PROGRESS NOTES
subjective     Chief Complaint   Patient presents with   • Hypertension     Has been running high   • Palpitations     Last two weeks   • Chest Pain     Left sided, pressure pain last two weeks     History of Present Illness  Patient is 71 years old white female who is here for follow-up.  She states that her blood pressure has been running high.  She has been taking Benicar 40 mg daily and hydralazine  .  Blood pressure is still running high.    Patient also has hypothyroidism and is taking Synthroid 50 mcg daily.  She is clinically euthyroid    Hyperlipidemia with statin intolerance.  She is taking questron and Zetia.  She is also trying to diet.  Denies any chest pain however she states that 2 weeks ago she had some left-sided ache in the left axilla no exertional related chest discomfort.  Patient has history of moderate mitral regurgitation and chronic left bundle branch block    Past Surgical History:   Procedure Laterality Date   • CARDIAC CATHETERIZATION     • CARDIAC CATHETERIZATION  2015   • CARDIOVASCULAR STRESS TEST  2015   •  SECTION      x 3    • ECHO - CONVERTED  2015   • THYROID SURGERY      age 14+ 15, for cysts     Family History   Problem Relation Age of Onset   • Lung cancer Mother    • Stroke Father      Past Medical History:   Diagnosis Date   • Aortic insufficiency    • Asthma    • Disease of thyroid gland    • Essential hypertension    • SOB (shortness of breath)      Patient Active Problem List   Diagnosis   • Essential hypertension   • Hypothyroidism (acquired)   • Gastroesophageal reflux disease without esophagitis   • Hyperlipidemia   • LBBB (left bundle branch block)   • Valvular heart disease trace AI, Mod MR   • Hypokalemia   • Chest pain in adult   • Statin intolerance and (no rhabdomyolysis ,no liver function abnormalities)   • Nonobstructive coronary artery disease    • Bradycardia   • Acute URI       Social History     Tobacco Use   • Smoking status: Never  Smoker   • Smokeless tobacco: Never Used   Substance Use Topics   • Alcohol use: No   • Drug use: No       Allergies   Allergen Reactions   • Aspirin Anaphylaxis   • Iron Shortness Of Breath   • Losartan Hives, Itching and Swelling   • Clonidine Unknown (See Comments)   • Eggs Or Egg-Derived Products    • Ace Inhibitors Rash   • Albuterol Rash   • Lisinopril Rash       Current Outpatient Medications on File Prior to Visit   Medication Sig   • cetirizine (ZyrTEC) 10 MG tablet Take 10 mg by mouth daily.   • cholestyramine (QUESTRAN) 4 g packet Take 1 packet by mouth 2 (Two) Times a Day.   • ezetimibe (ZETIA) 10 MG tablet Take 1 tablet by mouth Daily.   • fluticasone-salmeterol (ADVAIR) 250-50 MCG/DOSE DISKUS Inhale 2 (two) times a day.   • hydrALAZINE (APRESOLINE) 25 MG tablet Take 1 tablet by mouth 3 (Three) Times a Day. (Patient taking differently: Take 25 mg by mouth 3 (Three) Times a Day. Take 2 tablets 3 times daily)   • levothyroxine (SYNTHROID, LEVOTHROID) 50 MCG tablet TAKE ONE TABLET BY MOUTH ONCE DAILY EVERY MORNING   • Multiple Vitamin (MULTI VITAMIN DAILY PO) Take  by mouth.   • nitroglycerin (NITROSTAT) 0.4 MG SL tablet    • olmesartan (BENICAR) 40 MG tablet Take 1 tablet by mouth Daily.     No current facility-administered medications on file prior to visit.         The following portions of the patient's history were reviewed and updated as appropriate: allergies, current medications, past family history, past medical history, past social history, past surgical history and problem list.    Review of Systems   Constitutional: Negative.   HENT: Negative.  Negative for congestion.    Eyes: Negative.    Cardiovascular: Negative.  Negative for chest pain, cyanosis, dyspnea on exertion, irregular heartbeat, leg swelling, near-syncope, orthopnea, palpitations, paroxysmal nocturnal dyspnea and syncope.   Respiratory: Negative.  Negative for shortness of breath.    Hematologic/Lymphatic: Negative.   "  Musculoskeletal: Negative.    Gastrointestinal: Negative.    Neurological: Negative.  Negative for headaches.          Objective:     /90 (BP Location: Left arm, Patient Position: Sitting, Cuff Size: Adult)   Pulse 89   Temp 96.9 °F (36.1 °C)   Ht 152.4 cm (60\")   Wt 63.5 kg (140 lb)   SpO2 99%   BMI 27.34 kg/m²   Pulmonary:      Effort: Pulmonary effort is normal.      Breath sounds: Normal breath sounds. No stridor. No wheezing. No rhonchi. No rales.   Cardiovascular:      PMI at left midclavicular line. Normal rate. Regular rhythm. Normal S1. Normal S2.      Murmurs: There is no murmur.      No gallop. No click. No rub.   Pulses:     Intact distal pulses.   Edema:     Peripheral edema absent.           Lab Review  Lab Results   Component Value Date     05/18/2021    K 4.5 05/18/2021     (H) 05/18/2021    BUN 12 05/18/2021    CREATININE 0.93 05/18/2021    GLUCOSE 82 05/18/2021    CALCIUM 8.9 05/18/2021    ALT 9 05/18/2021    ALKPHOS 125 (H) 05/18/2021    LABIL2 1.5 05/19/2015     Lab Results   Component Value Date    CKTOTAL 131 05/18/2021     Lab Results   Component Value Date    WBC 7.71 05/18/2021    HGB 12.2 05/18/2021    HCT 38.4 05/18/2021     05/18/2021     Lab Results   Component Value Date    INR 0.89 01/17/2017    INR 0.96 07/23/2015     Lab Results   Component Value Date    MG 2.0 07/23/2015     Lab Results   Component Value Date    TSH 2.620 05/18/2021     Lab Results   Component Value Date    BNP 28.0 09/05/2018     Lab Results   Component Value Date    CHLPL 192 05/19/2015    CHOL 170 05/18/2021    TRIG 86 05/18/2021    HDL 75 (H) 05/18/2021    VLDL 16 05/18/2021    LDLHDL 1.04 05/18/2021     Lab Results   Component Value Date    LDL 79 05/18/2021     (H) 02/19/2020       Procedures       I personally viewed and interpreted the patient's LAB data         Assessment:     1. Essential hypertension    2. Mixed hyperlipidemia    3. LBBB (left bundle branch block)  "   4. Valvular heart disease trace AI, Mod MR    5. Hypothyroidism (acquired)          Plan:     Blood pressure is not very well controlled.  She was advised to continue Benicar and hydralazine.  She will add Norvasc 5 mg daily and check her blood pressure closely.    Hyperlipidemia LDL is 79.  She will continue questron and Zetia.  Patient also has hypothyroidism lab work scheduled for next visit.  Follow-up scheduled she will keep a record of her blood pressure for further adjustment of medications.        No follow-ups on file.

## 2022-04-11 RX ORDER — EZETIMIBE 10 MG/1
TABLET ORAL
Qty: 90 TABLET | Refills: 1 | Status: SHIPPED | OUTPATIENT
Start: 2022-04-11 | End: 2022-06-14 | Stop reason: SDUPTHER

## 2022-04-13 RX ORDER — OLMESARTAN MEDOXOMIL 40 MG/1
TABLET ORAL
Qty: 30 TABLET | Refills: 3 | Status: SHIPPED | OUTPATIENT
Start: 2022-04-13 | End: 2022-09-27 | Stop reason: SDUPTHER

## 2022-06-09 ENCOUNTER — LAB (OUTPATIENT)
Dept: LAB | Facility: HOSPITAL | Age: 72
End: 2022-06-09

## 2022-06-09 DIAGNOSIS — E78.2 MIXED HYPERLIPIDEMIA: ICD-10-CM

## 2022-06-09 LAB
ALBUMIN SERPL-MCNC: 3.9 G/DL (ref 3.5–5.2)
ALBUMIN/GLOB SERPL: 1.4 G/DL
ALP SERPL-CCNC: 114 U/L (ref 39–117)
ALT SERPL W P-5'-P-CCNC: 11 U/L (ref 1–33)
ANION GAP SERPL CALCULATED.3IONS-SCNC: 11 MMOL/L (ref 5–15)
AST SERPL-CCNC: 21 U/L (ref 1–32)
BASOPHILS # BLD MANUAL: 0.07 10*3/MM3 (ref 0–0.2)
BASOPHILS NFR BLD MANUAL: 1 % (ref 0–1.5)
BILIRUB SERPL-MCNC: 0.3 MG/DL (ref 0–1.2)
BUN SERPL-MCNC: 11 MG/DL (ref 8–23)
BUN/CREAT SERPL: 14.7 (ref 7–25)
CALCIUM SPEC-SCNC: 8.8 MG/DL (ref 8.6–10.5)
CHLORIDE SERPL-SCNC: 107 MMOL/L (ref 98–107)
CHOLEST SERPL-MCNC: 186 MG/DL (ref 0–200)
CK SERPL-CCNC: 55 U/L (ref 20–180)
CO2 SERPL-SCNC: 21 MMOL/L (ref 22–29)
CREAT SERPL-MCNC: 0.75 MG/DL (ref 0.57–1)
DEPRECATED RDW RBC AUTO: 47.9 FL (ref 37–54)
EGFRCR SERPLBLD CKD-EPI 2021: 85.8 ML/MIN/1.73
EOSINOPHIL # BLD MANUAL: 0.21 10*3/MM3 (ref 0–0.4)
EOSINOPHIL NFR BLD MANUAL: 3 % (ref 0.3–6.2)
ERYTHROCYTE [DISTWIDTH] IN BLOOD BY AUTOMATED COUNT: 15.9 % (ref 12.3–15.4)
GLOBULIN UR ELPH-MCNC: 2.8 GM/DL
GLUCOSE SERPL-MCNC: 86 MG/DL (ref 65–99)
HCT VFR BLD AUTO: 36.7 % (ref 34–46.6)
HDLC SERPL-MCNC: 85 MG/DL (ref 40–60)
HGB BLD-MCNC: 11.7 G/DL (ref 12–15.9)
LDLC SERPL CALC-MCNC: 87 MG/DL (ref 0–100)
LDLC/HDLC SERPL: 1 {RATIO}
LYMPHOCYTES # BLD MANUAL: 2.22 10*3/MM3 (ref 0.7–3.1)
LYMPHOCYTES NFR BLD MANUAL: 10 % (ref 5–12)
MCH RBC QN AUTO: 26.8 PG (ref 26.6–33)
MCHC RBC AUTO-ENTMCNC: 31.9 G/DL (ref 31.5–35.7)
MCV RBC AUTO: 84 FL (ref 79–97)
MONOCYTES # BLD: 0.69 10*3/MM3 (ref 0.1–0.9)
NEUTROPHILS # BLD AUTO: 3.74 10*3/MM3 (ref 1.7–7)
NEUTROPHILS NFR BLD MANUAL: 54 % (ref 42.7–76)
PLAT MORPH BLD: NORMAL
PLATELET # BLD AUTO: 187 10*3/MM3 (ref 140–450)
POTASSIUM SERPL-SCNC: 4.5 MMOL/L (ref 3.5–5.2)
PROT SERPL-MCNC: 6.7 G/DL (ref 6–8.5)
RBC # BLD AUTO: 4.37 10*6/MM3 (ref 3.77–5.28)
RBC MORPH BLD: NORMAL
SODIUM SERPL-SCNC: 139 MMOL/L (ref 136–145)
TRIGL SERPL-MCNC: 78 MG/DL (ref 0–150)
VARIANT LYMPHS NFR BLD MANUAL: 32 % (ref 19.6–45.3)
VLDLC SERPL-MCNC: 14 MG/DL (ref 5–40)
WBC MORPH BLD: NORMAL
WBC NRBC COR # BLD: 6.93 10*3/MM3 (ref 3.4–10.8)

## 2022-06-09 PROCEDURE — 36415 COLL VENOUS BLD VENIPUNCTURE: CPT

## 2022-06-09 PROCEDURE — 82550 ASSAY OF CK (CPK): CPT

## 2022-06-09 PROCEDURE — 85007 BL SMEAR W/DIFF WBC COUNT: CPT

## 2022-06-09 PROCEDURE — 80053 COMPREHEN METABOLIC PANEL: CPT

## 2022-06-09 PROCEDURE — 85025 COMPLETE CBC W/AUTO DIFF WBC: CPT

## 2022-06-09 PROCEDURE — 80061 LIPID PANEL: CPT

## 2022-06-13 RX ORDER — HYDRALAZINE HYDROCHLORIDE 50 MG/1
50 TABLET, FILM COATED ORAL 3 TIMES DAILY
Qty: 270 TABLET | Refills: 1 | Status: SHIPPED | OUTPATIENT
Start: 2022-06-13 | End: 2023-01-12 | Stop reason: SDUPTHER

## 2022-06-14 ENCOUNTER — OFFICE VISIT (OUTPATIENT)
Dept: CARDIOLOGY | Facility: CLINIC | Age: 72
End: 2022-06-14

## 2022-06-14 VITALS
WEIGHT: 133 LBS | TEMPERATURE: 97.3 F | HEIGHT: 60 IN | DIASTOLIC BLOOD PRESSURE: 72 MMHG | SYSTOLIC BLOOD PRESSURE: 136 MMHG | BODY MASS INDEX: 26.11 KG/M2 | HEART RATE: 83 BPM | OXYGEN SATURATION: 98 %

## 2022-06-14 DIAGNOSIS — E03.9 HYPOTHYROIDISM (ACQUIRED): ICD-10-CM

## 2022-06-14 DIAGNOSIS — Z78.9 STATIN INTOLERANCE: ICD-10-CM

## 2022-06-14 DIAGNOSIS — I38 VALVULAR HEART DISEASE: ICD-10-CM

## 2022-06-14 DIAGNOSIS — E78.2 MIXED HYPERLIPIDEMIA: ICD-10-CM

## 2022-06-14 DIAGNOSIS — I10 ESSENTIAL HYPERTENSION: Primary | ICD-10-CM

## 2022-06-14 DIAGNOSIS — I44.7 LBBB (LEFT BUNDLE BRANCH BLOCK): ICD-10-CM

## 2022-06-14 PROCEDURE — 99214 OFFICE O/P EST MOD 30 MIN: CPT | Performed by: INTERNAL MEDICINE

## 2022-06-14 RX ORDER — EZETIMIBE 10 MG/1
10 TABLET ORAL DAILY
Qty: 90 TABLET | Refills: 1 | Status: SHIPPED | OUTPATIENT
Start: 2022-06-14 | End: 2022-09-27 | Stop reason: SDUPTHER

## 2022-06-14 NOTE — PROGRESS NOTES
subjective     Chief Complaint   Patient presents with   • Hypertension     Follow up   • Med Refill     pending     History of Present Illness  Patient is 72 years old white female who is here for cardiology follow-up.  She has history of hyperlipidemia but is statin intolerant.  She is taking Zetia 10 mg daily, Questran 1 packet twice daily and is trying to diet.    She denies any chest pain palpitations or shortness of breath.  Blood pressure is very well controlled with Norvasc 5 mg daily, Benicar 40 mg daily and hydralazine 50 mg 3 times daily.    She also has hypothyroidism and is taking Synthroid 50 mcg daily.  She had lab work done which will be reviewed    Past Surgical History:   Procedure Laterality Date   • CARDIAC CATHETERIZATION     • CARDIAC CATHETERIZATION  2015   • CARDIOVASCULAR STRESS TEST  2015   •  SECTION      x 3    • ECHO - CONVERTED  2015   • THYROID SURGERY      age 14+ 15, for cysts     Family History   Problem Relation Age of Onset   • Lung cancer Mother    • Stroke Father      Past Medical History:   Diagnosis Date   • Aortic insufficiency    • Asthma    • Disease of thyroid gland    • Essential hypertension    • SOB (shortness of breath)      Patient Active Problem List   Diagnosis   • Essential hypertension   • Hypothyroidism (acquired)   • Gastroesophageal reflux disease without esophagitis   • Hyperlipidemia   • LBBB (left bundle branch block)   • Valvular heart disease trace AI, Mod MR   • Hypokalemia   • Chest pain in adult   • Statin intolerance and (no rhabdomyolysis ,no liver function abnormalities)   • Nonobstructive coronary artery disease    • Bradycardia   • Acute URI       Social History     Tobacco Use   • Smoking status: Never Smoker   • Smokeless tobacco: Never Used   Vaping Use   • Vaping Use: Never used   Substance Use Topics   • Alcohol use: No   • Drug use: No       Allergies   Allergen Reactions   • Aspirin Anaphylaxis   • Iron Shortness Of  Breath   • Losartan Hives, Itching and Swelling   • Clonidine Unknown (See Comments)   • Eggs Or Egg-Derived Products    • Ace Inhibitors Rash   • Albuterol Rash   • Lisinopril Rash       Current Outpatient Medications on File Prior to Visit   Medication Sig   • amLODIPine (NORVASC) 5 MG tablet Take 1 tablet by mouth Daily.   • cetirizine (ZyrTEC) 10 MG tablet Take 10 mg by mouth daily.   • cholestyramine (QUESTRAN) 4 g packet Take 1 packet by mouth 2 (Two) Times a Day.   • ezetimibe (ZETIA) 10 MG tablet TAKE ONE TABLET BY MOUTH ONCE DAILY   • fluticasone-salmeterol (ADVAIR) 250-50 MCG/DOSE DISKUS Inhale 2 (two) times a day.   • hydrALAZINE (APRESOLINE) 50 MG tablet Take 1 tablet by mouth 3 (Three) Times a Day. Take 2 tablets 3 times daily (Patient taking differently: Take 50 mg by mouth 3 (Three) Times a Day. 1 tablet TID)   • levothyroxine (SYNTHROID, LEVOTHROID) 50 MCG tablet TAKE ONE TABLET BY MOUTH ONCE DAILY EVERY MORNING   • Multiple Vitamin (MULTI VITAMIN DAILY PO) Take  by mouth.   • nitroglycerin (NITROSTAT) 0.4 MG SL tablet    • olmesartan (BENICAR) 40 MG tablet TAKE ONE TABLET BY MOUTH ONCE DAILY     No current facility-administered medications on file prior to visit.         The following portions of the patient's history were reviewed and updated as appropriate: allergies, current medications, past family history, past medical history, past social history, past surgical history and problem list.    Review of Systems   Constitutional: Negative.   HENT: Negative.  Negative for congestion.    Eyes: Negative.    Cardiovascular: Negative.  Negative for chest pain, cyanosis, dyspnea on exertion, irregular heartbeat, leg swelling, near-syncope, orthopnea, palpitations, paroxysmal nocturnal dyspnea and syncope.   Respiratory: Negative.  Negative for shortness of breath.    Hematologic/Lymphatic: Negative.    Musculoskeletal: Negative.    Gastrointestinal: Negative.    Neurological: Negative.  Negative for  "headaches.          Objective:     /72 (BP Location: Left arm, Patient Position: Sitting, Cuff Size: Adult)   Pulse 83   Temp 97.3 °F (36.3 °C)   Ht 152.4 cm (60\")   Wt 60.3 kg (133 lb)   SpO2 98%   BMI 25.97 kg/m²   Pulmonary:      Effort: Pulmonary effort is normal.      Breath sounds: Normal breath sounds. No stridor. No wheezing. No rhonchi. No rales.   Cardiovascular:      PMI at left midclavicular line. Normal rate. Regular rhythm. Normal S1. Normal S2.      Murmurs: There is no murmur.      No gallop. No click. No rub.   Pulses:     Intact distal pulses.   Edema:     Peripheral edema absent.           Lab Review  Lab Results   Component Value Date     06/09/2022    K 4.5 06/09/2022     06/09/2022    BUN 11 06/09/2022    CREATININE 0.75 06/09/2022    GLUCOSE 86 06/09/2022    CALCIUM 8.8 06/09/2022    ALT 11 06/09/2022    ALKPHOS 114 06/09/2022    LABIL2 1.5 05/19/2015     Lab Results   Component Value Date    CKTOTAL 55 06/09/2022     Lab Results   Component Value Date    WBC 6.93 06/09/2022    HGB 11.7 (L) 06/09/2022    HCT 36.7 06/09/2022     06/09/2022     Lab Results   Component Value Date    INR 0.89 01/17/2017    INR 0.96 07/23/2015     Lab Results   Component Value Date    MG 2.0 07/23/2015     Lab Results   Component Value Date    TSH 2.620 05/18/2021     Lab Results   Component Value Date    BNP 28.0 09/05/2018     Lab Results   Component Value Date    CHLPL 192 05/19/2015    CHOL 186 06/09/2022    TRIG 78 06/09/2022    HDL 85 (H) 06/09/2022    VLDL 14 06/09/2022    LDLHDL 1.00 06/09/2022     Lab Results   Component Value Date    LDL 87 06/09/2022    LDL 79 05/18/2021       Procedures       I personally viewed and interpreted the patient's LAB data         Assessment:     1. Essential hypertension    2. Mixed hyperlipidemia    3. Valvular heart disease trace AI, Mod MR    4. LBBB (left bundle branch block)    5. Hypothyroidism (acquired)    6. Statin intolerance and (no " rhabdomyolysis ,no liver function abnormalities)          Plan:     Lab work reviewed and discussed with the patient  Hemoglobin is slightly low otherwise lab work is good.  Lipid panel is normal LDL is 87.  Unfortunately patient cannot take statin therapy she will continue Questran and Zetia.  Healthy lifestyle emphasized.    Hypertension is very well controlled on current medications.  She will continue Norvasc, hydralazine and Benicar.    Patient has mild aortic regurgitation and moderate mitral regurgitation clinically no evidence of heart failure.  Hypothyroidism, Synthroid 50 mcg daily was continued.  Healthy lifestyle emphasized follow-up scheduled        No follow-ups on file.

## 2022-09-26 RX ORDER — LEVOTHYROXINE SODIUM 0.05 MG/1
TABLET ORAL
Qty: 30 TABLET | Refills: 3 | Status: SHIPPED | OUTPATIENT
Start: 2022-09-26 | End: 2023-02-20

## 2022-09-27 ENCOUNTER — OFFICE VISIT (OUTPATIENT)
Dept: CARDIOLOGY | Facility: CLINIC | Age: 72
End: 2022-09-27

## 2022-09-27 VITALS
OXYGEN SATURATION: 97 % | WEIGHT: 139 LBS | DIASTOLIC BLOOD PRESSURE: 82 MMHG | HEART RATE: 63 BPM | SYSTOLIC BLOOD PRESSURE: 148 MMHG | HEIGHT: 60 IN | BODY MASS INDEX: 27.29 KG/M2

## 2022-09-27 DIAGNOSIS — E03.9 HYPOTHYROIDISM (ACQUIRED): ICD-10-CM

## 2022-09-27 DIAGNOSIS — Z78.9 STATIN INTOLERANCE: ICD-10-CM

## 2022-09-27 DIAGNOSIS — I10 ESSENTIAL HYPERTENSION: ICD-10-CM

## 2022-09-27 DIAGNOSIS — E78.2 MIXED HYPERLIPIDEMIA: Primary | ICD-10-CM

## 2022-09-27 DIAGNOSIS — I38 VALVULAR HEART DISEASE: ICD-10-CM

## 2022-09-27 PROCEDURE — 99214 OFFICE O/P EST MOD 30 MIN: CPT | Performed by: INTERNAL MEDICINE

## 2022-09-27 RX ORDER — EZETIMIBE 10 MG/1
10 TABLET ORAL DAILY
Qty: 90 TABLET | Refills: 1 | Status: SHIPPED | OUTPATIENT
Start: 2022-09-27

## 2022-09-27 RX ORDER — OLMESARTAN MEDOXOMIL 40 MG/1
40 TABLET ORAL DAILY
Qty: 90 TABLET | Refills: 1 | Status: SHIPPED | OUTPATIENT
Start: 2022-09-27 | End: 2023-03-20

## 2022-09-27 NOTE — PROGRESS NOTES
subjective     Chief Complaint   Patient presents with   • Palpitations     Follow up   • Hypertension     Follow up     History of Present Illness  Patient is 72 years old white female who is here for cardiac follow-up.  Patient has history of hypertension, blood pressure is slightly elevated.  Patient states her blood pressure can go down.  EKG and it fluctuates.    She is currently taking Norvasc 5 mg daily, hydralazine 50 mg 3 times daily and Benicar 40 mg daily.    Patient has history of hyperlipidemia with statin intolerance  She is taking Zetia and Questran.    Hypothyroidism on Synthroid replacement therapy.  He denies any chest pain palpitations or shortness of breath.    Past Surgical History:   Procedure Laterality Date   • CARDIAC CATHETERIZATION     • CARDIAC CATHETERIZATION  2015   • CARDIOVASCULAR STRESS TEST  2015   •  SECTION      x 3    • ECHO - CONVERTED  2015   • THYROID SURGERY      age 14+ 15, for cysts     Family History   Problem Relation Age of Onset   • Lung cancer Mother    • Stroke Father      Past Medical History:   Diagnosis Date   • Aortic insufficiency    • Asthma    • Disease of thyroid gland    • Essential hypertension    • SOB (shortness of breath)      Patient Active Problem List   Diagnosis   • Essential hypertension   • Hypothyroidism (acquired)   • Gastroesophageal reflux disease without esophagitis   • Hyperlipidemia   • LBBB (left bundle branch block)   • Valvular heart disease trace AI, Mod MR   • Hypokalemia   • Chest pain in adult   • Statin intolerance and (no rhabdomyolysis ,no liver function abnormalities)   • Nonobstructive coronary artery disease    • Bradycardia   • Acute URI       Social History     Tobacco Use   • Smoking status: Never Smoker   • Smokeless tobacco: Never Used   Vaping Use   • Vaping Use: Never used   Substance Use Topics   • Alcohol use: No   • Drug use: No       Allergies   Allergen Reactions   • Aspirin Anaphylaxis   •  Iron Shortness Of Breath   • Losartan Hives, Itching and Swelling   • Clonidine Unknown (See Comments)   • Eggs Or Egg-Derived Products    • Ace Inhibitors Rash   • Albuterol Rash   • Lisinopril Rash       Current Outpatient Medications on File Prior to Visit   Medication Sig   • amLODIPine (NORVASC) 5 MG tablet Take 1 tablet by mouth Daily.   • cetirizine (ZyrTEC) 10 MG tablet Take 10 mg by mouth daily.   • cholestyramine (QUESTRAN) 4 g packet Take 1 packet by mouth 2 (Two) Times a Day.   • fluticasone-salmeterol (ADVAIR) 250-50 MCG/DOSE DISKUS Inhale 2 (two) times a day.   • hydrALAZINE (APRESOLINE) 50 MG tablet Take 1 tablet by mouth 3 (Three) Times a Day. Take 2 tablets 3 times daily (Patient taking differently: Take 50 mg by mouth 3 (Three) Times a Day. 1 tablet TID)   • levothyroxine (SYNTHROID, LEVOTHROID) 50 MCG tablet TAKE 1 TABLET BY MOUTH ONCE DAILY EVERY MORNING   • Multiple Vitamin (MULTI VITAMIN DAILY PO) Take  by mouth.   • nitroglycerin (NITROSTAT) 0.4 MG SL tablet    • [DISCONTINUED] ezetimibe (ZETIA) 10 MG tablet Take 1 tablet by mouth Daily.   • [DISCONTINUED] olmesartan (BENICAR) 40 MG tablet TAKE ONE TABLET BY MOUTH ONCE DAILY     No current facility-administered medications on file prior to visit.         The following portions of the patient's history were reviewed and updated as appropriate: allergies, current medications, past family history, past medical history, past social history, past surgical history and problem list.    Review of Systems   Constitutional: Negative.   HENT: Negative.  Negative for congestion.    Eyes: Negative.    Cardiovascular: Negative.  Negative for chest pain, cyanosis, dyspnea on exertion, irregular heartbeat, leg swelling, near-syncope, orthopnea, palpitations, paroxysmal nocturnal dyspnea and syncope.   Respiratory: Negative.  Negative for shortness of breath.    Hematologic/Lymphatic: Negative.    Musculoskeletal: Negative.    Gastrointestinal: Negative.   "  Neurological: Negative.  Negative for headaches.          Objective:     /82 (BP Location: Left arm, Patient Position: Sitting, Cuff Size: Adult)   Pulse 63   Ht 152.4 cm (60\")   Wt 63 kg (139 lb)   SpO2 97%   BMI 27.15 kg/m²   Pulmonary:      Effort: Pulmonary effort is normal.      Breath sounds: Normal breath sounds. No stridor. No wheezing. No rhonchi. No rales.   Cardiovascular:      PMI at left midclavicular line. Normal rate. Regular rhythm. Normal S1. Normal S2.      Murmurs: There is no murmur.      No gallop. No click. No rub.   Pulses:     Intact distal pulses.   Edema:     Peripheral edema absent.           Lab Review  Lab Results   Component Value Date     06/09/2022    K 4.5 06/09/2022     06/09/2022    BUN 11 06/09/2022    CREATININE 0.75 06/09/2022    GLUCOSE 86 06/09/2022    CALCIUM 8.8 06/09/2022    ALT 11 06/09/2022    ALKPHOS 114 06/09/2022    LABIL2 1.5 05/19/2015     Lab Results   Component Value Date    CKTOTAL 55 06/09/2022     Lab Results   Component Value Date    WBC 6.93 06/09/2022    HGB 11.7 (L) 06/09/2022    HCT 36.7 06/09/2022     06/09/2022     Lab Results   Component Value Date    INR 0.89 01/17/2017    INR 0.96 07/23/2015     Lab Results   Component Value Date    MG 2.0 07/23/2015     Lab Results   Component Value Date    TSH 2.620 05/18/2021     Lab Results   Component Value Date    BNP 28.0 09/05/2018     Lab Results   Component Value Date    CHLPL 192 05/19/2015    CHOL 186 06/09/2022    TRIG 78 06/09/2022    HDL 85 (H) 06/09/2022    VLDL 14 06/09/2022    LDLHDL 1.00 06/09/2022     Lab Results   Component Value Date    LDL 87 06/09/2022    LDL 79 05/18/2021       Procedures       I personally viewed and interpreted the patient's LAB data         Assessment:     1. Mixed hyperlipidemia    2. Essential hypertension    3. Hypothyroidism (acquired)    4. Statin intolerance and (no rhabdomyolysis ,no liver function abnormalities)    5. Valvular heart " disease trace AI, Mod MR          Plan:     Patient has hyperlipidemia with statin intolerance however her LDL is 87 with current medication which were continued.    Blood pressure is elevated patient was advised to increase Norvasc 5 mg twice daily if needed.  Patient is concerned because according to her blood pressure can go down easily.  Hypothyroidism Synthroid was continued  Lab work scheduled for next visit including CBC, CMP, lipid panel and CK.  And has trace AI and moderate mitral regurgitation stable  Follow-up scheduled         No follow-ups on file.

## 2022-12-12 RX ORDER — AMLODIPINE BESYLATE 5 MG/1
TABLET ORAL
Qty: 90 TABLET | Refills: 2 | Status: SHIPPED | OUTPATIENT
Start: 2022-12-12

## 2023-01-12 RX ORDER — HYDRALAZINE HYDROCHLORIDE 50 MG/1
50 TABLET, FILM COATED ORAL 3 TIMES DAILY
Qty: 270 TABLET | Refills: 1 | Status: SHIPPED | OUTPATIENT
Start: 2023-01-12 | End: 2023-01-16

## 2023-01-12 NOTE — TELEPHONE ENCOUNTER
Caller: Chasity Campos    Relationship: Self    Best call back number: [phone]    Requested Prescriptions:   Requested Prescriptions     Pending Prescriptions Disp Refills   • hydrALAZINE (APRESOLINE) 50 MG tablet 270 tablet 1     Sig: Take 1 tablet by mouth 3 (Three) Times a Day. Take 2 tablets 3 times daily        [facility] where [facility] be sent: [facility] [address] - [phone]  - [phone] [facility]     Additional details provided by patient: PT IS COMPLETELY OUT.    Does the patient have less than a 3 day supply:  [x] Yes  [] No    Would you like a call back once the refill request has been completed: [x] Yes [] No    If the office needs to give you a call back, can they leave a voicemail: [x] Yes [] No    Petar Connolly Rep   01/12/23 09:57 EST

## 2023-01-16 RX ORDER — HYDRALAZINE HYDROCHLORIDE 50 MG/1
TABLET, FILM COATED ORAL
Qty: 270 TABLET | Refills: 1 | Status: SHIPPED | OUTPATIENT
Start: 2023-01-16

## 2023-02-06 ENCOUNTER — LAB (OUTPATIENT)
Dept: LAB | Facility: HOSPITAL | Age: 73
End: 2023-02-06
Payer: MEDICARE

## 2023-02-06 DIAGNOSIS — E03.9 HYPOTHYROIDISM (ACQUIRED): ICD-10-CM

## 2023-02-06 DIAGNOSIS — E78.2 MIXED HYPERLIPIDEMIA: ICD-10-CM

## 2023-02-06 DIAGNOSIS — I10 ESSENTIAL HYPERTENSION: ICD-10-CM

## 2023-02-06 DIAGNOSIS — E78.2 MIXED HYPERLIPIDEMIA: Primary | ICD-10-CM

## 2023-02-06 LAB
ALBUMIN SERPL-MCNC: 4 G/DL (ref 3.5–5.2)
ALBUMIN/GLOB SERPL: 1.5 G/DL
ALP SERPL-CCNC: 110 U/L (ref 39–117)
ALT SERPL W P-5'-P-CCNC: 8 U/L (ref 1–33)
ANION GAP SERPL CALCULATED.3IONS-SCNC: 9 MMOL/L (ref 5–15)
AST SERPL-CCNC: 20 U/L (ref 1–32)
BASOPHILS # BLD AUTO: 0.08 10*3/MM3 (ref 0–0.2)
BASOPHILS NFR BLD AUTO: 0.9 % (ref 0–1.5)
BILIRUB SERPL-MCNC: 0.3 MG/DL (ref 0–1.2)
BUN SERPL-MCNC: 11 MG/DL (ref 8–23)
BUN/CREAT SERPL: 13.1 (ref 7–25)
CALCIUM SPEC-SCNC: 8.9 MG/DL (ref 8.6–10.5)
CHLORIDE SERPL-SCNC: 108 MMOL/L (ref 98–107)
CHOLEST SERPL-MCNC: 178 MG/DL (ref 0–200)
CK SERPL-CCNC: 62 U/L (ref 20–180)
CO2 SERPL-SCNC: 24 MMOL/L (ref 22–29)
CREAT SERPL-MCNC: 0.84 MG/DL (ref 0.57–1)
DEPRECATED RDW RBC AUTO: 44.9 FL (ref 37–54)
EGFRCR SERPLBLD CKD-EPI 2021: 74.9 ML/MIN/1.73
EOSINOPHIL # BLD AUTO: 0.33 10*3/MM3 (ref 0–0.4)
EOSINOPHIL NFR BLD AUTO: 3.8 % (ref 0.3–6.2)
ERYTHROCYTE [DISTWIDTH] IN BLOOD BY AUTOMATED COUNT: 15 % (ref 12.3–15.4)
GLOBULIN UR ELPH-MCNC: 2.6 GM/DL
GLUCOSE SERPL-MCNC: 88 MG/DL (ref 65–99)
HCT VFR BLD AUTO: 37.1 % (ref 34–46.6)
HDLC SERPL-MCNC: 79 MG/DL (ref 40–60)
HGB BLD-MCNC: 11.6 G/DL (ref 12–15.9)
LDLC SERPL CALC-MCNC: 86 MG/DL (ref 0–100)
LDLC/HDLC SERPL: 1.07 {RATIO}
LYMPHOCYTES # BLD AUTO: 3.39 10*3/MM3 (ref 0.7–3.1)
LYMPHOCYTES NFR BLD AUTO: 38.7 % (ref 19.6–45.3)
MCH RBC QN AUTO: 26.2 PG (ref 26.6–33)
MCHC RBC AUTO-ENTMCNC: 31.3 G/DL (ref 31.5–35.7)
MCV RBC AUTO: 83.7 FL (ref 79–97)
MONOCYTES # BLD AUTO: 0.63 10*3/MM3 (ref 0.1–0.9)
MONOCYTES NFR BLD AUTO: 7.2 % (ref 5–12)
NEUTROPHILS NFR BLD AUTO: 4.32 10*3/MM3 (ref 1.7–7)
NEUTROPHILS NFR BLD AUTO: 49.2 % (ref 42.7–76)
PLATELET # BLD AUTO: 225 10*3/MM3 (ref 140–450)
POTASSIUM SERPL-SCNC: 4.2 MMOL/L (ref 3.5–5.2)
PROT SERPL-MCNC: 6.6 G/DL (ref 6–8.5)
RBC # BLD AUTO: 4.43 10*6/MM3 (ref 3.77–5.28)
SODIUM SERPL-SCNC: 141 MMOL/L (ref 136–145)
T4 FREE SERPL-MCNC: 1.25 NG/DL (ref 0.93–1.7)
TRIGL SERPL-MCNC: 72 MG/DL (ref 0–150)
TSH SERPL DL<=0.05 MIU/L-ACNC: 2.86 UIU/ML (ref 0.27–4.2)
VLDLC SERPL-MCNC: 13 MG/DL (ref 5–40)
WBC NRBC COR # BLD: 8.77 10*3/MM3 (ref 3.4–10.8)

## 2023-02-06 PROCEDURE — 85025 COMPLETE CBC W/AUTO DIFF WBC: CPT

## 2023-02-06 PROCEDURE — 82550 ASSAY OF CK (CPK): CPT

## 2023-02-06 PROCEDURE — 84443 ASSAY THYROID STIM HORMONE: CPT

## 2023-02-06 PROCEDURE — 36415 COLL VENOUS BLD VENIPUNCTURE: CPT

## 2023-02-06 PROCEDURE — 84439 ASSAY OF FREE THYROXINE: CPT

## 2023-02-06 PROCEDURE — 80053 COMPREHEN METABOLIC PANEL: CPT

## 2023-02-06 PROCEDURE — 80061 LIPID PANEL: CPT

## 2023-02-09 ENCOUNTER — OFFICE VISIT (OUTPATIENT)
Dept: CARDIOLOGY | Facility: CLINIC | Age: 73
End: 2023-02-09
Payer: MEDICARE

## 2023-02-09 VITALS
OXYGEN SATURATION: 98 % | WEIGHT: 140 LBS | HEIGHT: 60 IN | DIASTOLIC BLOOD PRESSURE: 83 MMHG | HEART RATE: 69 BPM | SYSTOLIC BLOOD PRESSURE: 146 MMHG | BODY MASS INDEX: 27.48 KG/M2

## 2023-02-09 DIAGNOSIS — I10 ESSENTIAL HYPERTENSION: Primary | ICD-10-CM

## 2023-02-09 DIAGNOSIS — I38 VALVULAR HEART DISEASE: ICD-10-CM

## 2023-02-09 DIAGNOSIS — I44.7 LBBB (LEFT BUNDLE BRANCH BLOCK): ICD-10-CM

## 2023-02-09 DIAGNOSIS — E78.2 MIXED HYPERLIPIDEMIA: ICD-10-CM

## 2023-02-09 PROCEDURE — 99214 OFFICE O/P EST MOD 30 MIN: CPT | Performed by: INTERNAL MEDICINE

## 2023-02-09 RX ORDER — NITROGLYCERIN 80 MG/1
PATCH TRANSDERMAL
COMMUNITY
End: 2023-02-09

## 2023-02-09 NOTE — PROGRESS NOTES
subjective     Chief Complaint   Patient presents with   • Mixed hyperlipidemia     Routine follow up     History of Present Illness  Patient is 72 years old white female who is here for cardiology follow-up.    Hypertension  Blood pressure is mildly elevated.  She is taking Norvasc 5 mg daily, hydralazine and Benicar.    Hyperlipidemia  She is taking questron and Zetia.  She is also trying to follow diet.    Hypothyroidism on Synthroid 50 mcg daily.    She denies any chest pain palpitations or shortness of breath.    Past Surgical History:   Procedure Laterality Date   • CARDIAC CATHETERIZATION     • CARDIAC CATHETERIZATION  2015   • CARDIOVASCULAR STRESS TEST  2015   •  SECTION      x 3    • ECHO - CONVERTED  2015   • THYROID SURGERY      age 14+ 15, for cysts     Family History   Problem Relation Age of Onset   • Lung cancer Mother    • Stroke Father      Past Medical History:   Diagnosis Date   • Aortic insufficiency    • Asthma    • Disease of thyroid gland    • Essential hypertension    • SOB (shortness of breath)      Patient Active Problem List   Diagnosis   • Essential hypertension   • Hypothyroidism (acquired)   • Gastroesophageal reflux disease without esophagitis   • Hyperlipidemia   • LBBB (left bundle branch block)   • Valvular heart disease trace AI, Mod MR   • Hypokalemia   • Chest pain in adult   • Statin intolerance and (no rhabdomyolysis ,no liver function abnormalities)   • Nonobstructive coronary artery disease    • Bradycardia   • Acute URI       Social History     Tobacco Use   • Smoking status: Never   • Smokeless tobacco: Never   Vaping Use   • Vaping Use: Never used   Substance Use Topics   • Alcohol use: No   • Drug use: No       Allergies   Allergen Reactions   • Aspirin Anaphylaxis   • Iron Shortness Of Breath   • Losartan Hives, Itching and Swelling   • Clonidine Unknown (See Comments)   • Eggs Or Egg-Derived Products    • Ace Inhibitors Rash   • Albuterol Rash  "  • Lisinopril Rash       Current Outpatient Medications on File Prior to Visit   Medication Sig   • amLODIPine (NORVASC) 5 MG tablet TAKE ONE TABLET BY MOUTH ONCE DAILY   • cetirizine (ZyrTEC) 10 MG tablet Take 10 mg by mouth daily.   • cholestyramine (QUESTRAN) 4 g packet Take 1 packet by mouth 2 (Two) Times a Day.   • ezetimibe (ZETIA) 10 MG tablet Take 1 tablet by mouth Daily.   • fluticasone-salmeterol (ADVAIR) 250-50 MCG/DOSE DISKUS Inhale 2 (two) times a day.   • hydrALAZINE (APRESOLINE) 50 MG tablet TAKE 1 TABLET BY MOUTH 3 TIMES A DAY   • levothyroxine (SYNTHROID, LEVOTHROID) 50 MCG tablet TAKE 1 TABLET BY MOUTH ONCE DAILY EVERY MORNING   • Multiple Vitamin (MULTI VITAMIN DAILY PO) Take  by mouth.   • nitroglycerin (NITROSTAT) 0.4 MG SL tablet    • olmesartan (BENICAR) 40 MG tablet Take 1 tablet by mouth Daily.     No current facility-administered medications on file prior to visit.         The following portions of the patient's history were reviewed and updated as appropriate: allergies, current medications, past family history, past medical history, past social history, past surgical history and problem list.    Review of Systems   Constitutional: Negative.   HENT: Negative.  Negative for congestion.    Eyes: Negative.    Cardiovascular: Negative.  Negative for chest pain, cyanosis, dyspnea on exertion, irregular heartbeat, leg swelling, near-syncope, orthopnea, palpitations, paroxysmal nocturnal dyspnea and syncope.   Respiratory: Negative.  Negative for shortness of breath.    Hematologic/Lymphatic: Negative.    Musculoskeletal: Negative.    Gastrointestinal: Negative.    Neurological: Negative.  Negative for headaches.          Objective:     /83 (BP Location: Left arm, Patient Position: Sitting, Cuff Size: Adult)   Pulse 69   Ht 152.4 cm (60\")   Wt 63.5 kg (140 lb)   SpO2 98%   BMI 27.34 kg/m²   Pulmonary:      Effort: Pulmonary effort is normal.      Breath sounds: Normal breath sounds. " No stridor. No wheezing. No rhonchi. No rales.   Cardiovascular:      PMI at left midclavicular line. Normal rate. Regular rhythm. Normal S1. Normal S2.      Murmurs: There is no murmur.      No gallop. No click. No rub.   Pulses:     Intact distal pulses.   Edema:     Peripheral edema absent.           Lab Review  Lab Results   Component Value Date     02/06/2023    K 4.2 02/06/2023     (H) 02/06/2023    BUN 11 02/06/2023    CREATININE 0.84 02/06/2023    GLUCOSE 88 02/06/2023    CALCIUM 8.9 02/06/2023    ALT 8 02/06/2023    ALKPHOS 110 02/06/2023    LABIL2 1.5 05/19/2015     Lab Results   Component Value Date    CKTOTAL 62 02/06/2023     Lab Results   Component Value Date    WBC 8.77 02/06/2023    HGB 11.6 (L) 02/06/2023    HCT 37.1 02/06/2023     02/06/2023     Lab Results   Component Value Date    INR 0.89 01/17/2017    INR 0.96 07/23/2015     Lab Results   Component Value Date    MG 2.0 07/23/2015     Lab Results   Component Value Date    TSH 2.860 02/06/2023     Lab Results   Component Value Date    BNP 28.0 09/05/2018     Lab Results   Component Value Date    CHLPL 192 05/19/2015    CHOL 178 02/06/2023    TRIG 72 02/06/2023    HDL 79 (H) 02/06/2023    VLDL 13 02/06/2023    LDLHDL 1.07 02/06/2023     Lab Results   Component Value Date    LDL 86 02/06/2023    LDL 87 06/09/2022     No results found for: PROBNP  CARDIAC LABS:            ECG 12 Lead    Date/Time: 2/11/2023 11:57 AM  Performed by: John Banda MD  Authorized by: John Banda MD   Comparison: compared with previous ECG from 6/17/2021  Similar to previous ECG  Rhythm: sinus rhythm  Rate: normal  BPM: 80  Conduction: left bundle branch block  QRS axis: left  Other findings: non-specific ST-T wave changes    Clinical impression: abnormal EKG               I personally viewed and interpreted the patient's LAB data         Assessment:     1. Essential hypertension    2. LBBB (left bundle branch block)    3.  Valvular heart disease trace AI, Mod MR    4. Mixed hyperlipidemia          Plan:     Hypertension  Blood pressure is mildly elevated.  She was advised to take Norvasc 5 mg daily, hydralazine 50 mg 3 times daily and Benicar 40 mg daily.  Salt restriction healthy lifestyle emphasized.    Chronic left bundle branch block  No dizziness syncope or presyncope no palpitations or arrhythmia last stress test was negative for ischemia EKG does not show any acute changes.    Valvular heart disease with moderate mitral regurgitation and trivial AI.  Patient has no signs of heart failure.  We will continue to follow    Hyperlipidemia  Patient will continue questron and Zetia.  Healthy lifestyle emphasized follow-up scheduled          No follow-ups on file.

## 2023-02-11 PROCEDURE — 93000 ELECTROCARDIOGRAM COMPLETE: CPT | Performed by: INTERNAL MEDICINE

## 2023-02-20 RX ORDER — LEVOTHYROXINE SODIUM 0.05 MG/1
TABLET ORAL
Qty: 30 TABLET | Refills: 3 | Status: SHIPPED | OUTPATIENT
Start: 2023-02-20

## 2023-03-20 RX ORDER — OLMESARTAN MEDOXOMIL 40 MG/1
TABLET ORAL
Qty: 90 TABLET | Refills: 1 | Status: SHIPPED | OUTPATIENT
Start: 2023-03-20

## 2023-05-09 ENCOUNTER — OFFICE VISIT (OUTPATIENT)
Dept: CARDIOLOGY | Facility: CLINIC | Age: 73
End: 2023-05-09
Payer: MEDICARE

## 2023-05-09 VITALS
HEART RATE: 85 BPM | HEIGHT: 60 IN | SYSTOLIC BLOOD PRESSURE: 136 MMHG | BODY MASS INDEX: 27.48 KG/M2 | OXYGEN SATURATION: 97 % | DIASTOLIC BLOOD PRESSURE: 78 MMHG | WEIGHT: 140 LBS

## 2023-05-09 DIAGNOSIS — I10 ESSENTIAL HYPERTENSION: ICD-10-CM

## 2023-05-09 DIAGNOSIS — I38 VALVULAR HEART DISEASE: ICD-10-CM

## 2023-05-09 DIAGNOSIS — E87.6 HYPOKALEMIA: ICD-10-CM

## 2023-05-09 DIAGNOSIS — E78.2 MIXED HYPERLIPIDEMIA: Primary | ICD-10-CM

## 2023-05-09 DIAGNOSIS — E03.9 HYPOTHYROIDISM (ACQUIRED): ICD-10-CM

## 2023-05-09 DIAGNOSIS — Z78.9 STATIN INTOLERANCE: ICD-10-CM

## 2023-05-09 PROCEDURE — 3075F SYST BP GE 130 - 139MM HG: CPT | Performed by: INTERNAL MEDICINE

## 2023-05-09 PROCEDURE — 99214 OFFICE O/P EST MOD 30 MIN: CPT | Performed by: INTERNAL MEDICINE

## 2023-05-09 PROCEDURE — 3078F DIAST BP <80 MM HG: CPT | Performed by: INTERNAL MEDICINE

## 2023-05-09 RX ORDER — SPIRONOLACTONE 25 MG/1
12.5 TABLET ORAL DAILY
Qty: 45 TABLET | Refills: 1 | Status: SHIPPED | OUTPATIENT
Start: 2023-05-09

## 2023-05-09 NOTE — LETTER
May 9, 2023     Aileen Vargas, ERICK  [address] [place] [address]    Patient: Chasity Campos   YOB: 1950   Date of Visit: 2023       Dear Dr. Vargas, APRN:    Thank you for referring Chasity Campos to me for evaluation. Below are the relevant portions of my assessment and plan of care.    If you have questions, please do not hesitate to call me. I look forward to following Chasity along with you.         Sincerely,        John Banda MD        CC: No Recipients    John Banda MD  [id number] [phone]  Signed  subjective     Chief Complaint   Patient presents with   • Hypertension     Follow up   • Hyperlipidemia     Follow up     History of Present Illness    Patient is 73 years old white female who is here for cardiology follow-up.  Blood pressure is very well controlled.  She is taking Norvasc, hydralazine, Benicar.  She is going to  where her potassium was low at 3.4.  We will start Aldactone low-dose.    Hypothyroidism on Synthroid replacement therapy clinically euthyroid.    Hyperlipidemia with statin intolerance.  She is taking Zetia and questron.    Lab work scheduled.  Past Surgical History:   Procedure Laterality Date   • CARDIAC CATHETERIZATION     • CARDIAC CATHETERIZATION  2015   • CARDIOVASCULAR STRESS TEST  2015   •  SECTION      x 3    • ECHO - CONVERTED  2015   • THYROID SURGERY      age 14+ 15, for cysts     Family History   Problem Relation Age of Onset   • Lung cancer Mother    • Stroke Father      Past Medical History:   Diagnosis Date   • Aortic insufficiency    • Asthma    • Disease of thyroid gland    • Essential hypertension    • SOB (shortness of breath)      Patient Active Problem List   Diagnosis   • Essential hypertension   • Hypothyroidism (acquired)   • Gastroesophageal reflux disease without esophagitis   • Hyperlipidemia   • LBBB (left bundle branch block)   • Valvular heart disease trace AI, Mod MR   •  Hypokalemia   • Chest pain in adult   • Statin intolerance and (no rhabdomyolysis ,no liver function abnormalities)   • Nonobstructive coronary artery disease 2015   • Bradycardia   • Acute URI       Social History     Tobacco Use   • Smoking status: Never   • Smokeless tobacco: Never   Vaping Use   • Vaping Use: Never used   Substance Use Topics   • Alcohol use: No   • Drug use: No       Allergies   Allergen Reactions   • Aspirin Anaphylaxis   • Iron Shortness Of Breath   • Losartan Hives, Itching and Swelling   • Clonidine Unknown (See Comments)   • Eggs Or Egg-Derived Products    • Ace Inhibitors Rash   • Albuterol Rash   • Lisinopril Rash       Current Outpatient Medications on File Prior to Visit   Medication Sig   • amLODIPine (NORVASC) 5 MG tablet TAKE ONE TABLET BY MOUTH ONCE DAILY   • cetirizine (ZyrTEC) 10 MG tablet Take 1 tablet by mouth Daily.   • cholestyramine (QUESTRAN) 4 g packet Take 1 packet by mouth 2 (Two) Times a Day.   • ezetimibe (ZETIA) 10 MG tablet Take 1 tablet by mouth Daily.   • fluticasone-salmeterol (ADVAIR) 250-50 MCG/DOSE DISKUS Inhale 2 (two) times a day.   • hydrALAZINE (APRESOLINE) 50 MG tablet TAKE 1 TABLET BY MOUTH 3 TIMES A DAY   • levothyroxine (SYNTHROID, LEVOTHROID) 50 MCG tablet TAKE 1 TABLET BY MOUTH ONCE DAILY EVERY MORNING   • Multiple Vitamin (MULTI VITAMIN DAILY PO) Take  by mouth.   • nitroglycerin (NITROSTAT) 0.4 MG SL tablet    • olmesartan (BENICAR) 40 MG tablet TAKE 1 TABLET BY MOUTH ONCE DAILY     No current facility-administered medications on file prior to visit.         The following portions of the patient's history were reviewed and updated as appropriate: allergies, current medications, past family history, past medical history, past social history, past surgical history and problem list.    Review of Systems   Constitutional: Negative.   HENT: Negative.  Negative for congestion.    Eyes: Negative.    Cardiovascular: Negative.  Negative for chest pain,  "cyanosis, dyspnea on exertion, irregular heartbeat, leg swelling, near-syncope, orthopnea, palpitations, paroxysmal nocturnal dyspnea and syncope.   Respiratory: Negative.  Negative for shortness of breath.    Hematologic/Lymphatic: Negative.    Musculoskeletal: Negative.    Gastrointestinal: Negative.    Neurological: Negative.  Negative for headaches.         Objective:     /78 (BP Location: Left arm, Patient Position: Sitting, Cuff Size: Adult)   Pulse 85   Ht 152.4 cm (60\")   Wt 63.5 kg (140 lb)   SpO2 97%   BMI 27.34 kg/m²   Pulmonary:      Effort: Pulmonary effort is normal.      Breath sounds: Normal breath sounds. No stridor. No wheezing. No rhonchi. No rales.   Cardiovascular:      PMI at left midclavicular line. Normal rate. Regular rhythm. Normal S1. Normal S2.      Murmurs: There is no murmur.      No gallop. No click. No rub.   Pulses:     Intact distal pulses.   Edema:     Peripheral edema absent.           Lab Review    Lab Results   Component Value Date    CKTOTAL 62 02/06/2023     Lab Results   Component Value Date    WBC 9.86 03/10/2023    HGB 11.6 03/10/2023    HCT 36.2 03/10/2023     03/10/2023     Lab Results   Component Value Date    INR 0.89 01/17/2017    INR 0.96 07/23/2015     Lab Results   Component Value Date    MG 2.0 07/23/2015     Lab Results   Component Value Date    TSH 2.860 02/06/2023     Lab Results   Component Value Date    BNP 28.0 09/05/2018     Lab Results   Component Value Date    CHLPL 192 05/19/2015    CHOL 178 02/06/2023    TRIG 72 02/06/2023    HDL 79 (H) 02/06/2023    VLDL 13 02/06/2023    LDLHDL 1.07 02/06/2023     Lab Results   Component Value Date    LDL 86 02/06/2023    LDL 87 06/09/2022     No results found for: PROBNP            Procedures       I personally viewed and interpreted the patient's LAB data        Assessment:     1. Mixed hyperlipidemia    2. Hypokalemia    3. Hypothyroidism (acquired)    4. Statin intolerance and (no rhabdomyolysis " ,no liver function abnormalities)    5. Valvular heart disease trace AI, Mod MR    6. Essential hypertension          Plan:     Hyperlipidemia  Lab work reviewed lipid panel is normal patient will continue questron and Zetia.    Hypokalemia  Potassium was 3.4 patient with was advised to take Aldactone 12.5 mg daily.  We will check lab work again next visit.  Combination of Benicar and Aldactone could cause hyperkalemia.    Hypothyroidism  Thyroid functions are normal Synthroid was continued.    Blood pressure is also very well controlled she will continue current medications.  Valvular heart disease.  We will continue to monitor.  Clinically no evidence of heart failure.  Follow-up scheduled        No follow-ups on file.

## 2023-05-09 NOTE — PROGRESS NOTES
subjective     Chief Complaint   Patient presents with   • Hypertension     Follow up   • Hyperlipidemia     Follow up     History of Present Illness    Patient is 73 years old white female who is here for cardiology follow-up.  Blood pressure is very well controlled.  She is taking Norvasc, hydralazine, Benicar.  She is going to  where her potassium was low at 3.4.  We will start Aldactone low-dose.    Hypothyroidism on Synthroid replacement therapy clinically euthyroid.    Hyperlipidemia with statin intolerance.  She is taking Zetia and questron.    Lab work scheduled.  Past Surgical History:   Procedure Laterality Date   • CARDIAC CATHETERIZATION     • CARDIAC CATHETERIZATION  2015   • CARDIOVASCULAR STRESS TEST  2015   •  SECTION      x 3    • ECHO - CONVERTED  2015   • THYROID SURGERY      age 14+ 15, for cysts     Family History   Problem Relation Age of Onset   • Lung cancer Mother    • Stroke Father      Past Medical History:   Diagnosis Date   • Aortic insufficiency    • Asthma    • Disease of thyroid gland    • Essential hypertension    • SOB (shortness of breath)      Patient Active Problem List   Diagnosis   • Essential hypertension   • Hypothyroidism (acquired)   • Gastroesophageal reflux disease without esophagitis   • Hyperlipidemia   • LBBB (left bundle branch block)   • Valvular heart disease trace AI, Mod MR   • Hypokalemia   • Chest pain in adult   • Statin intolerance and (no rhabdomyolysis ,no liver function abnormalities)   • Nonobstructive coronary artery disease    • Bradycardia   • Acute URI       Social History     Tobacco Use   • Smoking status: Never   • Smokeless tobacco: Never   Vaping Use   • Vaping Use: Never used   Substance Use Topics   • Alcohol use: No   • Drug use: No       Allergies   Allergen Reactions   • Aspirin Anaphylaxis   • Iron Shortness Of Breath   • Losartan Hives, Itching and Swelling   • Clonidine Unknown (See Comments)   • Eggs Or  "Egg-Derived Products    • Ace Inhibitors Rash   • Albuterol Rash   • Lisinopril Rash       Current Outpatient Medications on File Prior to Visit   Medication Sig   • amLODIPine (NORVASC) 5 MG tablet TAKE ONE TABLET BY MOUTH ONCE DAILY   • cetirizine (ZyrTEC) 10 MG tablet Take 1 tablet by mouth Daily.   • cholestyramine (QUESTRAN) 4 g packet Take 1 packet by mouth 2 (Two) Times a Day.   • ezetimibe (ZETIA) 10 MG tablet Take 1 tablet by mouth Daily.   • fluticasone-salmeterol (ADVAIR) 250-50 MCG/DOSE DISKUS Inhale 2 (two) times a day.   • hydrALAZINE (APRESOLINE) 50 MG tablet TAKE 1 TABLET BY MOUTH 3 TIMES A DAY   • levothyroxine (SYNTHROID, LEVOTHROID) 50 MCG tablet TAKE 1 TABLET BY MOUTH ONCE DAILY EVERY MORNING   • Multiple Vitamin (MULTI VITAMIN DAILY PO) Take  by mouth.   • nitroglycerin (NITROSTAT) 0.4 MG SL tablet    • olmesartan (BENICAR) 40 MG tablet TAKE 1 TABLET BY MOUTH ONCE DAILY     No current facility-administered medications on file prior to visit.         The following portions of the patient's history were reviewed and updated as appropriate: allergies, current medications, past family history, past medical history, past social history, past surgical history and problem list.    Review of Systems   Constitutional: Negative.   HENT: Negative.  Negative for congestion.    Eyes: Negative.    Cardiovascular: Negative.  Negative for chest pain, cyanosis, dyspnea on exertion, irregular heartbeat, leg swelling, near-syncope, orthopnea, palpitations, paroxysmal nocturnal dyspnea and syncope.   Respiratory: Negative.  Negative for shortness of breath.    Hematologic/Lymphatic: Negative.    Musculoskeletal: Negative.    Gastrointestinal: Negative.    Neurological: Negative.  Negative for headaches.          Objective:     /78 (BP Location: Left arm, Patient Position: Sitting, Cuff Size: Adult)   Pulse 85   Ht 152.4 cm (60\")   Wt 63.5 kg (140 lb)   SpO2 97%   BMI 27.34 kg/m²   Pulmonary:      Effort: " Pulmonary effort is normal.      Breath sounds: Normal breath sounds. No stridor. No wheezing. No rhonchi. No rales.   Cardiovascular:      PMI at left midclavicular line. Normal rate. Regular rhythm. Normal S1. Normal S2.      Murmurs: There is no murmur.      No gallop. No click. No rub.   Pulses:     Intact distal pulses.   Edema:     Peripheral edema absent.           Lab Review    Lab Results   Component Value Date    CKTOTAL 62 02/06/2023     Lab Results   Component Value Date    WBC 9.86 03/10/2023    HGB 11.6 03/10/2023    HCT 36.2 03/10/2023     03/10/2023     Lab Results   Component Value Date    INR 0.89 01/17/2017    INR 0.96 07/23/2015     Lab Results   Component Value Date    MG 2.0 07/23/2015     Lab Results   Component Value Date    TSH 2.860 02/06/2023     Lab Results   Component Value Date    BNP 28.0 09/05/2018     Lab Results   Component Value Date    CHLPL 192 05/19/2015    CHOL 178 02/06/2023    TRIG 72 02/06/2023    HDL 79 (H) 02/06/2023    VLDL 13 02/06/2023    LDLHDL 1.07 02/06/2023     Lab Results   Component Value Date    LDL 86 02/06/2023    LDL 87 06/09/2022     No results found for: PROBNP            Procedures       I personally viewed and interpreted the patient's LAB data         Assessment:     1. Mixed hyperlipidemia    2. Hypokalemia    3. Hypothyroidism (acquired)    4. Statin intolerance and (no rhabdomyolysis ,no liver function abnormalities)    5. Valvular heart disease trace AI, Mod MR    6. Essential hypertension          Plan:     Hyperlipidemia  Lab work reviewed lipid panel is normal patient will continue questron and Zetia.    Hypokalemia  Potassium was 3.4 patient with was advised to take Aldactone 12.5 mg daily.  We will check lab work again next visit.  Combination of Benicar and Aldactone could cause hyperkalemia.    Hypothyroidism  Thyroid functions are normal Synthroid was continued.    Blood pressure is also very well controlled she will continue current  medications.  Valvular heart disease.  We will continue to monitor.  Clinically no evidence of heart failure.  Follow-up scheduled        No follow-ups on file.

## 2023-08-07 ENCOUNTER — LAB (OUTPATIENT)
Dept: LAB | Facility: HOSPITAL | Age: 73
End: 2023-08-07
Payer: MEDICARE

## 2023-08-07 DIAGNOSIS — E78.2 MIXED HYPERLIPIDEMIA: ICD-10-CM

## 2023-08-07 LAB
ALBUMIN SERPL-MCNC: 4.5 G/DL (ref 3.5–5.2)
ALBUMIN/GLOB SERPL: 1.7 G/DL
ALP SERPL-CCNC: 109 U/L (ref 39–117)
ALT SERPL W P-5'-P-CCNC: 10 U/L (ref 1–33)
ANION GAP SERPL CALCULATED.3IONS-SCNC: 10 MMOL/L (ref 5–15)
AST SERPL-CCNC: 20 U/L (ref 1–32)
BILIRUB SERPL-MCNC: 0.3 MG/DL (ref 0–1.2)
BUN SERPL-MCNC: 18 MG/DL (ref 8–23)
BUN/CREAT SERPL: 20.7 (ref 7–25)
CALCIUM SPEC-SCNC: 9.5 MG/DL (ref 8.6–10.5)
CHLORIDE SERPL-SCNC: 105 MMOL/L (ref 98–107)
CHOLEST SERPL-MCNC: 193 MG/DL (ref 0–200)
CK SERPL-CCNC: 80 U/L (ref 20–180)
CO2 SERPL-SCNC: 24 MMOL/L (ref 22–29)
CREAT SERPL-MCNC: 0.87 MG/DL (ref 0.57–1)
DEPRECATED RDW RBC AUTO: 50.6 FL (ref 37–54)
EGFRCR SERPLBLD CKD-EPI 2021: 71.3 ML/MIN/1.73
EOSINOPHIL # BLD MANUAL: 0.08 10*3/MM3 (ref 0–0.4)
EOSINOPHIL NFR BLD MANUAL: 1 % (ref 0.3–6.2)
ERYTHROCYTE [DISTWIDTH] IN BLOOD BY AUTOMATED COUNT: 16.3 % (ref 12.3–15.4)
GLOBULIN UR ELPH-MCNC: 2.7 GM/DL
GLUCOSE SERPL-MCNC: 90 MG/DL (ref 65–99)
HCT VFR BLD AUTO: 37 % (ref 34–46.6)
HDLC SERPL-MCNC: 79 MG/DL (ref 40–60)
HGB BLD-MCNC: 11.8 G/DL (ref 12–15.9)
LDLC SERPL CALC-MCNC: 97 MG/DL (ref 0–100)
LDLC/HDLC SERPL: 1.19 {RATIO}
LYMPHOCYTES # BLD MANUAL: 1.79 10*3/MM3 (ref 0.7–3.1)
LYMPHOCYTES NFR BLD MANUAL: 6 % (ref 5–12)
MCH RBC QN AUTO: 27 PG (ref 26.6–33)
MCHC RBC AUTO-ENTMCNC: 31.9 G/DL (ref 31.5–35.7)
MCV RBC AUTO: 84.7 FL (ref 79–97)
MONOCYTES # BLD: 0.47 10*3/MM3 (ref 0.1–0.9)
NEUTROPHILS # BLD AUTO: 5.45 10*3/MM3 (ref 1.7–7)
NEUTROPHILS NFR BLD MANUAL: 70 % (ref 42.7–76)
PLAT MORPH BLD: NORMAL
PLATELET # BLD AUTO: 185 10*3/MM3 (ref 140–450)
POTASSIUM SERPL-SCNC: 4.8 MMOL/L (ref 3.5–5.2)
PROT SERPL-MCNC: 7.2 G/DL (ref 6–8.5)
RBC # BLD AUTO: 4.37 10*6/MM3 (ref 3.77–5.28)
RBC MORPH BLD: NORMAL
SODIUM SERPL-SCNC: 139 MMOL/L (ref 136–145)
TRIGL SERPL-MCNC: 98 MG/DL (ref 0–150)
VARIANT LYMPHS NFR BLD MANUAL: 23 % (ref 19.6–45.3)
VLDLC SERPL-MCNC: 17 MG/DL (ref 5–40)
WBC MORPH BLD: NORMAL
WBC NRBC COR # BLD: 7.79 10*3/MM3 (ref 3.4–10.8)

## 2023-08-07 PROCEDURE — 36415 COLL VENOUS BLD VENIPUNCTURE: CPT

## 2023-08-07 PROCEDURE — 80053 COMPREHEN METABOLIC PANEL: CPT

## 2023-08-07 PROCEDURE — 85007 BL SMEAR W/DIFF WBC COUNT: CPT

## 2023-08-07 PROCEDURE — 82550 ASSAY OF CK (CPK): CPT

## 2023-08-07 PROCEDURE — 80061 LIPID PANEL: CPT

## 2023-08-07 PROCEDURE — 85025 COMPLETE CBC W/AUTO DIFF WBC: CPT

## 2023-08-08 ENCOUNTER — OFFICE VISIT (OUTPATIENT)
Dept: CARDIOLOGY | Facility: CLINIC | Age: 73
End: 2023-08-08
Payer: MEDICARE

## 2023-08-08 DIAGNOSIS — E78.2 MIXED HYPERLIPIDEMIA: ICD-10-CM

## 2023-08-08 DIAGNOSIS — E03.9 HYPOTHYROIDISM (ACQUIRED): ICD-10-CM

## 2023-08-08 DIAGNOSIS — Z78.9 STATIN INTOLERANCE: ICD-10-CM

## 2023-08-08 DIAGNOSIS — I10 ESSENTIAL HYPERTENSION: Primary | ICD-10-CM

## 2023-08-08 DIAGNOSIS — I38 VALVULAR HEART DISEASE: ICD-10-CM

## 2023-08-08 PROCEDURE — 3077F SYST BP >= 140 MM HG: CPT | Performed by: INTERNAL MEDICINE

## 2023-08-08 PROCEDURE — 3079F DIAST BP 80-89 MM HG: CPT | Performed by: INTERNAL MEDICINE

## 2023-08-08 PROCEDURE — 99214 OFFICE O/P EST MOD 30 MIN: CPT | Performed by: INTERNAL MEDICINE

## 2023-08-08 RX ORDER — HYDRALAZINE HYDROCHLORIDE 50 MG/1
50 TABLET, FILM COATED ORAL 4 TIMES DAILY
Qty: 360 TABLET | Refills: 1 | Status: SHIPPED | OUTPATIENT
Start: 2023-08-08

## 2023-08-08 NOTE — PROGRESS NOTES
subjective     Chief Complaint   Patient presents with    Results     labs    Hypertension     today     History of Present Illness    Patient is 73 years old white female who is here for follow-up.  Blood pressure is elevated today she is taking her medications regularly.    She also had lab work which will be reviewed.    Hypertension  She is taking Benicar 40 mg daily, Aldactone 12.5 mg daily, hydralazine 50 mg 3 times daily and Norvasc 5 mg daily.    Hyperlipidemia  She is taking Zetia and questran.  She is intolerant to statin therapy.    Hypothyroidism  Clinically euthyroid on Synthroid 50 mcg daily.    Valvular heart disease  Patient has moderate mitral regurgitation and trace AI clinically stable asymptomatic.    Past Surgical History:   Procedure Laterality Date    CARDIAC CATHETERIZATION      CARDIAC CATHETERIZATION  2015    CARDIOVASCULAR STRESS TEST  2015     SECTION      x 3     ECHO - CONVERTED  2015    THYROID SURGERY      age 14+ 15, for cysts     Family History   Problem Relation Age of Onset    Lung cancer Mother     Stroke Father      Past Medical History:   Diagnosis Date    Aortic insufficiency     Asthma     Disease of thyroid gland     Essential hypertension     SOB (shortness of breath)      Patient Active Problem List   Diagnosis    Essential hypertension    Hypothyroidism (acquired)    Gastroesophageal reflux disease without esophagitis    Hyperlipidemia    LBBB (left bundle branch block)    Valvular heart disease trace AI, Mod MR    Hypokalemia    Chest pain in adult    Statin intolerance and (no rhabdomyolysis ,no liver function abnormalities)    Nonobstructive coronary artery disease 2015    Bradycardia    Acute URI       Social History     Tobacco Use    Smoking status: Never    Smokeless tobacco: Never   Vaping Use    Vaping Use: Never used   Substance Use Topics    Alcohol use: No    Drug use: No       Allergies   Allergen Reactions    Aspirin Anaphylaxis    Iron  "Shortness Of Breath    Losartan Hives, Itching and Swelling    Clonidine Unknown (See Comments)    Eggs Or Egg-Derived Products     Ace Inhibitors Rash    Albuterol Rash    Lisinopril Rash       Current Outpatient Medications on File Prior to Visit   Medication Sig    amLODIPine (NORVASC) 5 MG tablet TAKE ONE TABLET BY MOUTH ONCE DAILY    cetirizine (ZyrTEC) 10 MG tablet Take 1 tablet by mouth Daily.    cholestyramine (QUESTRAN) 4 g packet Take 1 packet by mouth 2 (Two) Times a Day.    ezetimibe (ZETIA) 10 MG tablet TAKE 1 TABLET BY MOUTH ONCE DAILY    fluticasone-salmeterol (ADVAIR) 250-50 MCG/DOSE DISKUS Inhale 2 (two) times a day.    levothyroxine (SYNTHROID, LEVOTHROID) 50 MCG tablet TAKE 1 TABLET BY MOUTH ONCE DAILY EVERY MORNING    Multiple Vitamin (MULTI VITAMIN DAILY PO) Take  by mouth.    nitroglycerin (NITROSTAT) 0.4 MG SL tablet     olmesartan (BENICAR) 40 MG tablet TAKE 1 TABLET BY MOUTH ONCE DAILY    spironolactone (ALDACTONE) 25 MG tablet Take 0.5 tablets by mouth Daily.     No current facility-administered medications on file prior to visit.         The following portions of the patient's history were reviewed and updated as appropriate: allergies, current medications, past family history, past medical history, past social history, past surgical history and problem list.    Review of Systems   Constitutional: Negative.   HENT: Negative.  Negative for congestion.    Eyes: Negative.    Cardiovascular: Negative.  Negative for chest pain, cyanosis, dyspnea on exertion, irregular heartbeat, leg swelling, near-syncope, orthopnea, palpitations, paroxysmal nocturnal dyspnea and syncope.   Respiratory: Negative.  Negative for shortness of breath.    Hematologic/Lymphatic: Negative.    Musculoskeletal: Negative.    Gastrointestinal: Negative.    Neurological: Negative.  Negative for headaches.        Objective:     /84   Pulse 78   Ht 152.4 cm (60\")   Wt 62.1 kg (137 lb)   SpO2 99%   BMI 26.76 kg/mý "   Pulmonary:      Effort: Pulmonary effort is normal.      Breath sounds: Normal breath sounds. No stridor. No wheezing. No rhonchi. No rales.   Cardiovascular:      PMI at left midclavicular line. Normal rate. Regular rhythm. Normal S1. Normal S2.       Murmurs: There is no murmur.      No gallop.  No click. No rub.   Pulses:     Intact distal pulses.   Edema:     Peripheral edema absent.         Lab Review  Lab Results   Component Value Date     08/07/2023    K 4.8 08/07/2023     08/07/2023    BUN 18 08/07/2023    CREATININE 0.87 08/07/2023    GLUCOSE 90 08/07/2023    CALCIUM 9.5 08/07/2023    ALT 10 08/07/2023    ALKPHOS 109 08/07/2023    LABIL2 1.5 05/19/2015     Lab Results   Component Value Date    CKTOTAL 80 08/07/2023     Lab Results   Component Value Date    WBC 7.79 08/07/2023    HGB 11.8 (L) 08/07/2023    HCT 37.0 08/07/2023     08/07/2023     Lab Results   Component Value Date    INR 0.89 01/17/2017    INR 0.96 07/23/2015     Lab Results   Component Value Date    MG 2.0 07/23/2015     Lab Results   Component Value Date    TSH 2.860 02/06/2023     Lab Results   Component Value Date    BNP 28.0 09/05/2018     Lab Results   Component Value Date    CHLPL 192 05/19/2015    CHOL 193 08/07/2023    TRIG 98 08/07/2023    HDL 79 (H) 08/07/2023    VLDL 17 08/07/2023    LDLHDL 1.19 08/07/2023     Lab Results   Component Value Date    LDL 97 08/07/2023    LDL 86 02/06/2023     No results found for: PROBNP            Procedures       I personally viewed and interpreted the patient's LAB data         Assessment:     1. Essential hypertension    2. Mixed hyperlipidemia    3. Statin intolerance and (no rhabdomyolysis ,no liver function abnormalities)    4. Hypothyroidism (acquired)    5. Valvular heart disease trace AI, Mod MR          Plan:     Hypertension  Blood pressure is not very well-controlled.  Patient is taking Benicar 40 mg daily, Aldactone 12.5 mg daily which were continued.  She also is  taking hydralazine 50 mg 3 times a day and Norvasc 5 mg daily.  Option of increasing Norvasc versus adding extra hydralazine were discussed with the patient in detail.  She had ankle edema before and and does not wish to increase Norvasc.  She will take extra hydralazine.    Hyperlipidemia with statin intolerance  She will continue Zetia and questron last LDL was 97.    Hypothyroidism  Synthroid 50 mcg daily was continued.    Valvular heart disease moderate mitral regurgitation and trace AI asymptomatic will continue to follow.  Follow-up scheduled        No follow-ups on file.

## 2023-08-08 NOTE — LETTER
2023     ERICK Mcdonnell  [address] [place] [address]    Patient: Chasity Campos   YOB: 1950   Date of Visit: 2023       Dear ERICK Mcdonnell    Chasity Campos was in my office today. Below is a copy of my note.    If you have questions, please do not hesitate to call me. I look forward to following Chasity along with you.         Sincerely,        John Banda MD        CC: No Recipients    subjective     Chief Complaint   Patient presents with    Results     labs    Hypertension     today     History of Present Illness          Past Surgical History:   Procedure Laterality Date    CARDIAC CATHETERIZATION      CARDIAC CATHETERIZATION  2015    CARDIOVASCULAR STRESS TEST  2015     SECTION      x 3     ECHO - CONVERTED  2015    THYROID SURGERY      age 14+ 15, for cysts     Family History   Problem Relation Age of Onset    Lung cancer Mother     Stroke Father      Past Medical History:   Diagnosis Date    Aortic insufficiency     Asthma     Disease of thyroid gland     Essential hypertension     SOB (shortness of breath)      Patient Active Problem List   Diagnosis    Essential hypertension    Hypothyroidism (acquired)    Gastroesophageal reflux disease without esophagitis    Hyperlipidemia    LBBB (left bundle branch block)    Valvular heart disease trace AI, Mod MR    Hypokalemia    Chest pain in adult    Statin intolerance and (no rhabdomyolysis ,no liver function abnormalities)    Nonobstructive coronary artery disease 2015    Bradycardia    Acute URI       Social History     Tobacco Use    Smoking status: Never    Smokeless tobacco: Never   Vaping Use    Vaping Use: Never used   Substance Use Topics    Alcohol use: No    Drug use: No       Allergies   Allergen Reactions    Aspirin Anaphylaxis    Iron Shortness Of Breath    Losartan Hives, Itching and Swelling    Clonidine Unknown (See  "Comments)    Eggs Or Egg-Derived Products     Ace Inhibitors Rash    Albuterol Rash    Lisinopril Rash       Current Outpatient Medications on File Prior to Visit   Medication Sig    amLODIPine (NORVASC) 5 MG tablet TAKE ONE TABLET BY MOUTH ONCE DAILY    cetirizine (ZyrTEC) 10 MG tablet Take 1 tablet by mouth Daily.    cholestyramine (QUESTRAN) 4 g packet Take 1 packet by mouth 2 (Two) Times a Day.    ezetimibe (ZETIA) 10 MG tablet TAKE 1 TABLET BY MOUTH ONCE DAILY    fluticasone-salmeterol (ADVAIR) 250-50 MCG/DOSE DISKUS Inhale 2 (two) times a day.    hydrALAZINE (APRESOLINE) 50 MG tablet TAKE 1 TABLET BY MOUTH 3 TIMES A DAY    levothyroxine (SYNTHROID, LEVOTHROID) 50 MCG tablet TAKE 1 TABLET BY MOUTH ONCE DAILY EVERY MORNING    Multiple Vitamin (MULTI VITAMIN DAILY PO) Take  by mouth.    nitroglycerin (NITROSTAT) 0.4 MG SL tablet     olmesartan (BENICAR) 40 MG tablet TAKE 1 TABLET BY MOUTH ONCE DAILY    spironolactone (ALDACTONE) 25 MG tablet Take 0.5 tablets by mouth Daily.     No current facility-administered medications on file prior to visit.         The following portions of the patient's history were reviewed and updated as appropriate: allergies, current medications, past family history, past medical history, past social history, past surgical history and problem list.    ROS       Objective:     /88 (BP Location: Left arm, Patient Position: Sitting, Cuff Size: Adult)   Pulse 78   Ht 152.4 cm (60\")   Wt 62.1 kg (137 lb)   SpO2 99%   BMI 26.76 kg/mý   Physical Exam      Lab Review  Lab Results   Component Value Date     08/07/2023    K 4.8 08/07/2023     08/07/2023    BUN 18 08/07/2023    CREATININE 0.87 08/07/2023    GLUCOSE 90 08/07/2023    CALCIUM 9.5 08/07/2023    ALT 10 08/07/2023    ALKPHOS 109 08/07/2023    LABIL2 1.5 05/19/2015     Lab Results   Component Value Date    CKTOTAL 80 08/07/2023     Lab Results   Component Value Date    WBC 7.79 08/07/2023    HGB 11.8 " (L) 08/07/2023    HCT 37.0 08/07/2023     08/07/2023     Lab Results   Component Value Date    INR 0.89 01/17/2017    INR 0.96 07/23/2015     Lab Results   Component Value Date    MG 2.0 07/23/2015     Lab Results   Component Value Date    TSH 2.860 02/06/2023     Lab Results   Component Value Date    BNP 28.0 09/05/2018     Lab Results   Component Value Date    CHLPL 192 05/19/2015    CHOL 193 08/07/2023    TRIG 98 08/07/2023    HDL 79 (H) 08/07/2023    VLDL 17 08/07/2023    LDLHDL 1.19 08/07/2023     Lab Results   Component Value Date    LDL 97 08/07/2023    LDL 86 02/06/2023     No results found for: PROBNP            Procedures       I personally viewed and interpreted the patient's LAB data         Assessment:   No diagnosis found.      Plan:              No follow-ups on file.

## 2023-08-09 VITALS
HEIGHT: 60 IN | SYSTOLIC BLOOD PRESSURE: 146 MMHG | WEIGHT: 137 LBS | OXYGEN SATURATION: 99 % | BODY MASS INDEX: 26.9 KG/M2 | HEART RATE: 78 BPM | DIASTOLIC BLOOD PRESSURE: 84 MMHG

## 2023-09-15 RX ORDER — AMLODIPINE BESYLATE 5 MG/1
TABLET ORAL
Qty: 90 TABLET | Refills: 2 | Status: SHIPPED | OUTPATIENT
Start: 2023-09-15

## 2023-09-18 RX ORDER — OLMESARTAN MEDOXOMIL 40 MG/1
TABLET ORAL
Qty: 90 TABLET | Refills: 1 | Status: SHIPPED | OUTPATIENT
Start: 2023-09-18

## 2023-10-23 RX ORDER — SPIRONOLACTONE 25 MG/1
12.5 TABLET ORAL DAILY
Qty: 45 TABLET | Refills: 1 | Status: SHIPPED | OUTPATIENT
Start: 2023-10-23

## 2023-10-23 RX ORDER — LEVOTHYROXINE SODIUM 0.05 MG/1
TABLET ORAL
Qty: 30 TABLET | Refills: 3 | Status: SHIPPED | OUTPATIENT
Start: 2023-10-23

## 2023-11-02 ENCOUNTER — OFFICE VISIT (OUTPATIENT)
Dept: CARDIOLOGY | Facility: CLINIC | Age: 73
End: 2023-11-02
Payer: MEDICARE

## 2023-11-02 VITALS
HEIGHT: 60 IN | WEIGHT: 136 LBS | BODY MASS INDEX: 26.7 KG/M2 | DIASTOLIC BLOOD PRESSURE: 72 MMHG | SYSTOLIC BLOOD PRESSURE: 134 MMHG | HEART RATE: 71 BPM | OXYGEN SATURATION: 99 %

## 2023-11-02 DIAGNOSIS — R06.00 DYSPNEA, UNSPECIFIED TYPE: Primary | ICD-10-CM

## 2023-11-02 DIAGNOSIS — I44.7 LBBB (LEFT BUNDLE BRANCH BLOCK): ICD-10-CM

## 2023-11-02 DIAGNOSIS — R00.2 PALPITATIONS: ICD-10-CM

## 2023-11-02 DIAGNOSIS — I10 ESSENTIAL HYPERTENSION: ICD-10-CM

## 2023-11-02 DIAGNOSIS — E78.2 MIXED HYPERLIPIDEMIA: ICD-10-CM

## 2023-11-02 DIAGNOSIS — E03.9 HYPOTHYROIDISM (ACQUIRED): ICD-10-CM

## 2023-11-02 DIAGNOSIS — I38 VALVULAR HEART DISEASE: ICD-10-CM

## 2023-11-02 PROCEDURE — 1160F RVW MEDS BY RX/DR IN RCRD: CPT | Performed by: NURSE PRACTITIONER

## 2023-11-02 PROCEDURE — 1159F MED LIST DOCD IN RCRD: CPT | Performed by: NURSE PRACTITIONER

## 2023-11-02 PROCEDURE — 3075F SYST BP GE 130 - 139MM HG: CPT | Performed by: NURSE PRACTITIONER

## 2023-11-02 PROCEDURE — 93000 ELECTROCARDIOGRAM COMPLETE: CPT | Performed by: NURSE PRACTITIONER

## 2023-11-02 PROCEDURE — 99214 OFFICE O/P EST MOD 30 MIN: CPT | Performed by: NURSE PRACTITIONER

## 2023-11-02 PROCEDURE — 3078F DIAST BP <80 MM HG: CPT | Performed by: NURSE PRACTITIONER

## 2023-11-02 RX ORDER — LEVOTHYROXINE SODIUM 0.05 MG/1
50 TABLET ORAL DAILY
Qty: 30 TABLET | Refills: 5 | Status: SHIPPED | OUTPATIENT
Start: 2023-11-02

## 2023-11-02 RX ORDER — AMLODIPINE BESYLATE 5 MG/1
5 TABLET ORAL DAILY
Qty: 90 TABLET | Refills: 1 | Status: SHIPPED | OUTPATIENT
Start: 2023-11-02

## 2023-11-02 NOTE — LETTER
November 19, 2023     ERICK Mcdonnell  [address] [place] [address]    Patient: Chasity Campos   YOB: 1950   Date of Visit: 11/2/2023       Dear ERICK Mcdonnell    Chasity Campos was in my office today. Below is a copy of my note.    If you have questions, please do not hesitate to call me. I look forward to following Chasity along with you.         Sincerely,        ERICK Davis        CC: No Recipients    Subjective    Chasity Campos is a 73 y.o. female.   Chief Complaint   Patient presents with   • Hypertension     Follow up    • Med Refill     pended     History of Present Illness   Chasity Campos is a 73-year-old female who presents to clinic today for cardiology follow-up.  She is accompanied by her .    Hypertension currently on Benicar 40 mg daily, Aldactone 12.5 mg daily, hydralazine 50 mg 3 times daily and Norvasc 5 mg daily.  Reports compliance with medicine.  She reports home BPs are stable.     Hyperlipidemia currently on Zetia and Questran.  She is intolerant to statin therapy.  Recent LDL was 97.  Denies chest pain.      Hypothyroidism currently on Synthroid 50 mcg daily.      Valvular heart disease with mild mitral regurgitation and trace AI.  She is concerned about her valvular heart disease and would like to have that checked on.  She denies chest pain but feels palpitations.  She denies worsening dyspnea, orthopnea or swelling.    Patient Active Problem List   Diagnosis   • Essential hypertension   • Hypothyroidism (acquired)   • Gastroesophageal reflux disease without esophagitis   • Hyperlipidemia   • LBBB (left bundle branch block)   • Valvular heart disease trace AI, Mod MR   • Hypokalemia   • Chest pain in adult   • Statin intolerance and (no rhabdomyolysis ,no liver function abnormalities)   • Nonobstructive coronary artery disease 2015   • Bradycardia   • Acute URI     Past Medical History:   Diagnosis Date   • Aortic  insufficiency    • Asthma    • Disease of thyroid gland    • Essential hypertension    • SOB (shortness of breath)      Past Surgical History:   Procedure Laterality Date   • CARDIAC CATHETERIZATION     • CARDIAC CATHETERIZATION  2015   • CARDIOVASCULAR STRESS TEST  2015   •  SECTION      x 3    • ECHO - CONVERTED  2015   • THYROID SURGERY      age 16+ 17, for cysts     Family History   Problem Relation Age of Onset   • Lung cancer Mother    • Stroke Father      Social History     Tobacco Use   • Smoking status: Never   • Smokeless tobacco: Never   Vaping Use   • Vaping Use: Never used   Substance Use Topics   • Alcohol use: No   • Drug use: No     The following portions of the patient's history were reviewed and updated as appropriate: allergies, current medications, past family history, past medical history, past social history, past surgical history and problem list.    Allergies   Allergen Reactions   • Aspirin Anaphylaxis   • Iron Shortness Of Breath   • Losartan Hives, Itching and Swelling   • Clonidine Unknown (See Comments)   • Eggs Or Egg-Derived Products    • Ace Inhibitors Rash   • Albuterol Rash   • Lisinopril Rash       Current Outpatient Medications:   •  amLODIPine (NORVASC) 5 MG tablet, Take 1 tablet by mouth Daily., Disp: 90 tablet, Rfl: 1  •  cetirizine (ZyrTEC) 10 MG tablet, Take 1 tablet by mouth Daily., Disp: , Rfl:   •  cholestyramine (QUESTRAN) 4 g packet, Take 1 packet by mouth 2 (Two) Times a Day., Disp: 180 packet, Rfl: 3  •  ezetimibe (ZETIA) 10 MG tablet, TAKE 1 TABLET BY MOUTH ONCE DAILY, Disp: 90 tablet, Rfl: 1  •  fluticasone-salmeterol (ADVAIR) 250-50 MCG/DOSE DISKUS, Inhale 2 (two) times a day., Disp: , Rfl:   •  hydrALAZINE (APRESOLINE) 50 MG tablet, Take 1 tablet by mouth 4 (Four) Times a Day., Disp: 360 tablet, Rfl: 1  •  levothyroxine (SYNTHROID, LEVOTHROID) 50 MCG tablet, Take 1 tablet by mouth Daily., Disp: 30 tablet, Rfl: 5  •  Multiple Vitamin (MULTI  "VITAMIN DAILY PO), Take  by mouth., Disp: , Rfl:   •  nitroglycerin (NITROSTAT) 0.4 MG SL tablet, , Disp: , Rfl:   •  olmesartan (BENICAR) 40 MG tablet, TAKE 1 TABLET BY MOUTH ONCE DAILY, Disp: 90 tablet, Rfl: 1  •  spironolactone (ALDACTONE) 25 MG tablet, TAKE 1/2 TABLET BY MOUTH ONCE DAILY, Disp: 45 tablet, Rfl: 1    Review of Systems   Constitutional:  Negative for activity change, appetite change, chills, diaphoresis, fatigue and fever.   HENT:  Negative for congestion, drooling, ear discharge, ear pain, mouth sores, nosebleeds, postnasal drip, rhinorrhea, sinus pressure, sneezing and sore throat.    Eyes:  Negative for pain, discharge and visual disturbance.   Respiratory:  Positive for shortness of breath. Negative for cough, chest tightness and wheezing.    Cardiovascular:  Positive for palpitations. Negative for chest pain and leg swelling.   Gastrointestinal:  Negative for abdominal pain, constipation, diarrhea, nausea and vomiting.   Endocrine: Negative for cold intolerance, heat intolerance, polydipsia, polyphagia and polyuria.   Musculoskeletal:  Negative for arthralgias, myalgias and neck pain.   Skin:  Negative for rash and wound.   Neurological:  Negative for dizziness, syncope, speech difficulty, weakness, light-headedness and headaches.   Hematological:  Negative for adenopathy. Does not bruise/bleed easily.   Psychiatric/Behavioral:  Negative for confusion, dysphoric mood and sleep disturbance. The patient is not nervous/anxious.    All other systems reviewed and are negative.    /72 (BP Location: Left arm, Patient Position: Sitting, Cuff Size: Adult)   Pulse 71   Ht 152.4 cm (60\")   Wt 61.7 kg (136 lb)   SpO2 99%   BMI 26.56 kg/m²     Objective  Allergies   Allergen Reactions   • Aspirin Anaphylaxis   • Iron Shortness Of Breath   • Losartan Hives, Itching and Swelling   • Clonidine Unknown (See Comments)   • Eggs Or Egg-Derived Products    • Ace Inhibitors Rash   • Albuterol Rash   • " Lisinopril Rash       Physical Exam  Vitals reviewed.   Constitutional:       Appearance: Normal appearance. She is well-developed.   HENT:      Head: Normocephalic.      Right Ear: Tympanic membrane normal.      Left Ear: Tympanic membrane normal.      Nose: Nose normal.   Eyes:      Conjunctiva/sclera: Conjunctivae normal.      Pupils: Pupils are equal, round, and reactive to light.   Neck:      Thyroid: No thyromegaly.      Vascular: No carotid bruit or JVD.   Cardiovascular:      Rate and Rhythm: Normal rate and regular rhythm.   Pulmonary:      Effort: Pulmonary effort is normal.      Breath sounds: Normal breath sounds.   Abdominal:      General: Bowel sounds are normal.      Palpations: Abdomen is soft. There is no hepatomegaly, splenomegaly or mass.      Tenderness: There is no abdominal tenderness.   Musculoskeletal:         General: Normal range of motion.      Cervical back: Neck supple.      Right lower leg: No edema.      Left lower leg: No edema.   Skin:     General: Skin is warm and dry.   Neurological:      Mental Status: She is alert and oriented to person, place, and time.   Psychiatric:         Attention and Perception: Attention normal.         Mood and Affect: Mood normal.         Speech: Speech normal.         Behavior: Behavior normal. Behavior is cooperative.         Cognition and Memory: Cognition normal.           ECG 12 Lead    Date/Time: 11/19/2023 6:03 PM  Performed by: Margaret Hayes APRN    Authorized by: Margaret Hayes APRN  Comparison: compared with previous ECG   Similar to previous ECG  Rhythm: sinus rhythm  Rate: normal  BPM: 63  Conduction: left bundle branch block and non-specific intraventricular conduction delay  QRS axis: left    Clinical impression: abnormal EKG and myocardial infarction  Clinical impression comment: anteroseptal, indeterminate age          LABS  WBC   Date Value Ref Range Status   08/07/2023 7.79 3.40 - 10.80 10*3/mm3 Final   03/10/2023 9.86 3.70 -  10.30 10*3/uL Final     RBC   Date Value Ref Range Status   08/07/2023 4.37 3.77 - 5.28 10*6/mm3 Final   03/10/2023 4.32 3.90 - 5.20 10*6/uL Final     Hemoglobin   Date Value Ref Range Status   08/07/2023 11.8 (L) 12.0 - 15.9 g/dL Final   03/10/2023 11.6 11.2 - 15.7 g/dL Final     Hematocrit   Date Value Ref Range Status   08/07/2023 37.0 34.0 - 46.6 % Final   03/10/2023 36.2 34.0 - 45.0 % Final     MCV   Date Value Ref Range Status   08/07/2023 84.7 79.0 - 97.0 fL Final   03/10/2023 84 79 - 98 fL Final     MCH   Date Value Ref Range Status   08/07/2023 27.0 26.6 - 33.0 pg Final   03/10/2023 26.9 26.0 - 32.0 pg Final     MCHC   Date Value Ref Range Status   08/07/2023 31.9 31.5 - 35.7 g/dL Final   03/10/2023 32.0 30.7 - 35.5 g/dL Final     RDW   Date Value Ref Range Status   08/07/2023 16.3 (H) 12.3 - 15.4 % Final   03/10/2023 17.5 (H) 11.5 - 14.5 % Final     RDW-SD   Date Value Ref Range Status   08/07/2023 50.6 37.0 - 54.0 fl Final     MPV   Date Value Ref Range Status   03/10/2023   Final     Comment:     Not Measured     Platelets   Date Value Ref Range Status   08/07/2023 185 140 - 450 10*3/mm3 Final   03/10/2023 224 155 - 369 10*3/uL Final     Neutrophil %   Date Value Ref Range Status   02/06/2023 49.2 42.7 - 76.0 % Final     Lymphocyte %   Date Value Ref Range Status   02/06/2023 38.7 19.6 - 45.3 % Final     Monocyte %   Date Value Ref Range Status   02/06/2023 7.2 5.0 - 12.0 % Final     Eosinophil %   Date Value Ref Range Status   02/06/2023 3.8 0.3 - 6.2 % Final     Basophil %   Date Value Ref Range Status   02/06/2023 0.9 0.0 - 1.5 % Final     Immature Grans %   Date Value Ref Range Status   05/18/2021 0.3 0.0 - 0.5 % Final     Neutrophils Absolute   Date Value Ref Range Status   08/07/2023 5.45 1.70 - 7.00 10*3/mm3 Final     Neutrophils, Absolute   Date Value Ref Range Status   02/06/2023 4.32 1.70 - 7.00 10*3/mm3 Final     Lymphocytes, Absolute   Date Value Ref Range Status   02/06/2023 3.39 (H) 0.70 -  3.10 10*3/mm3 Final     Monocytes, Absolute   Date Value Ref Range Status   02/06/2023 0.63 0.10 - 0.90 10*3/mm3 Final     Eosinophils Absolute   Date Value Ref Range Status   08/07/2023 0.08 0.00 - 0.40 10*3/mm3 Final     Eosinophils, Absolute   Date Value Ref Range Status   02/06/2023 0.33 0.00 - 0.40 10*3/mm3 Final     Basophils, Absolute   Date Value Ref Range Status   02/06/2023 0.08 0.00 - 0.20 10*3/mm3 Final     Immature Grans, Absolute   Date Value Ref Range Status   05/18/2021 0.02 0.00 - 0.05 10*3/mm3 Final     nRBC   Date Value Ref Range Status   03/10/2023 0.0 <=0.0 per 100 WBCs Final   05/18/2021 0.0 0.0 - 0.2 /100 WBC Final       Total Cholesterol   Date Value Ref Range Status   08/07/2023 193 0 - 200 mg/dL Final     Triglycerides   Date Value Ref Range Status   08/07/2023 98 0 - 150 mg/dL Final     HDL Cholesterol   Date Value Ref Range Status   08/07/2023 79 (H) 40 - 60 mg/dL Final     LDL Cholesterol    Date Value Ref Range Status   08/07/2023 97 0 - 100 mg/dL Final         Assessment & Plan  Diagnoses and all orders for this visit:    1. Dyspnea, unspecified type (Primary)  -     Adult Transthoracic Echo Complete W/ Cont if Necessary Per Protocol; Future  -     ECG 12 Lead  EKG reviewed and discussed  Further evaluation will be arranged    2. Palpitations  -     Holter Monitor - 72 Hour Up To 15 Days; Future  Further evaluation will be arranged    3. Valvular heart disease  -     Adult Transthoracic Echo Complete W/ Cont if Necessary Per Protocol; Future  Further evaluation will be arranged    4. Essential hypertension  Controlled, continue current therapy, routine monitoring recommended, sodium restrictions recommended    5. Mixed hyperlipidemia  Continue on statin, heart healthy diet     6. Hypothyroidism (acquired)  Continue on synthroid    7. LBBB (left bundle branch block)  Chronic     Other orders  -     amLODIPine (NORVASC) 5 MG tablet; Take 1 tablet by mouth Daily.  Dispense: 90 tablet;  Refill: 1  -     levothyroxine (SYNTHROID, LEVOTHROID) 50 MCG tablet; Take 1 tablet by mouth Daily.  Dispense: 30 tablet; Refill: 5        Follow up in 3 months, sooner if needed.

## 2023-11-02 NOTE — PROGRESS NOTES
Subjective     Chasity Campos is a 73 y.o. female.   Chief Complaint   Patient presents with   • Hypertension     Follow up    • Med Refill     pended     History of Present Illness   Chasity Campos is a 73-year-old female who presents to clinic today for cardiology follow-up.  She is accompanied by her .    Hypertension currently on Benicar 40 mg daily, Aldactone 12.5 mg daily, hydralazine 50 mg 3 times daily and Norvasc 5 mg daily.  Reports compliance with medicine.  She reports home BPs are stable.     Hyperlipidemia currently on Zetia and Questran.  She is intolerant to statin therapy.  Recent LDL was 97.  Denies chest pain.      Hypothyroidism currently on Synthroid 50 mcg daily.      Valvular heart disease with mild mitral regurgitation and trace AI.  She is concerned about her valvular heart disease and would like to have that checked on.  She denies chest pain but feels palpitations.  She denies worsening dyspnea, orthopnea or swelling.    Patient Active Problem List   Diagnosis   • Essential hypertension   • Hypothyroidism (acquired)   • Gastroesophageal reflux disease without esophagitis   • Hyperlipidemia   • LBBB (left bundle branch block)   • Valvular heart disease trace AI, Mod MR   • Hypokalemia   • Chest pain in adult   • Statin intolerance and (no rhabdomyolysis ,no liver function abnormalities)   • Nonobstructive coronary artery disease    • Bradycardia   • Acute URI     Past Medical History:   Diagnosis Date   • Aortic insufficiency    • Asthma    • Disease of thyroid gland    • Essential hypertension    • SOB (shortness of breath)      Past Surgical History:   Procedure Laterality Date   • CARDIAC CATHETERIZATION     • CARDIAC CATHETERIZATION  2015   • CARDIOVASCULAR STRESS TEST  2015   •  SECTION      x 3    • ECHO - CONVERTED  2015   • THYROID SURGERY      age 16+ 17, for cysts     Family History   Problem Relation Age of Onset   • Lung cancer Mother    • Stroke  Father      Social History     Tobacco Use   • Smoking status: Never   • Smokeless tobacco: Never   Vaping Use   • Vaping Use: Never used   Substance Use Topics   • Alcohol use: No   • Drug use: No     The following portions of the patient's history were reviewed and updated as appropriate: allergies, current medications, past family history, past medical history, past social history, past surgical history and problem list.    Allergies   Allergen Reactions   • Aspirin Anaphylaxis   • Iron Shortness Of Breath   • Losartan Hives, Itching and Swelling   • Clonidine Unknown (See Comments)   • Eggs Or Egg-Derived Products    • Ace Inhibitors Rash   • Albuterol Rash   • Lisinopril Rash       Current Outpatient Medications:   •  amLODIPine (NORVASC) 5 MG tablet, Take 1 tablet by mouth Daily., Disp: 90 tablet, Rfl: 1  •  cetirizine (ZyrTEC) 10 MG tablet, Take 1 tablet by mouth Daily., Disp: , Rfl:   •  cholestyramine (QUESTRAN) 4 g packet, Take 1 packet by mouth 2 (Two) Times a Day., Disp: 180 packet, Rfl: 3  •  ezetimibe (ZETIA) 10 MG tablet, TAKE 1 TABLET BY MOUTH ONCE DAILY, Disp: 90 tablet, Rfl: 1  •  fluticasone-salmeterol (ADVAIR) 250-50 MCG/DOSE DISKUS, Inhale 2 (two) times a day., Disp: , Rfl:   •  hydrALAZINE (APRESOLINE) 50 MG tablet, Take 1 tablet by mouth 4 (Four) Times a Day., Disp: 360 tablet, Rfl: 1  •  levothyroxine (SYNTHROID, LEVOTHROID) 50 MCG tablet, Take 1 tablet by mouth Daily., Disp: 30 tablet, Rfl: 5  •  Multiple Vitamin (MULTI VITAMIN DAILY PO), Take  by mouth., Disp: , Rfl:   •  nitroglycerin (NITROSTAT) 0.4 MG SL tablet, , Disp: , Rfl:   •  olmesartan (BENICAR) 40 MG tablet, TAKE 1 TABLET BY MOUTH ONCE DAILY, Disp: 90 tablet, Rfl: 1  •  spironolactone (ALDACTONE) 25 MG tablet, TAKE 1/2 TABLET BY MOUTH ONCE DAILY, Disp: 45 tablet, Rfl: 1    Review of Systems   Constitutional:  Negative for activity change, appetite change, chills, diaphoresis, fatigue and fever.   HENT:  Negative for congestion,  "drooling, ear discharge, ear pain, mouth sores, nosebleeds, postnasal drip, rhinorrhea, sinus pressure, sneezing and sore throat.    Eyes:  Negative for pain, discharge and visual disturbance.   Respiratory:  Positive for shortness of breath. Negative for cough, chest tightness and wheezing.    Cardiovascular:  Positive for palpitations. Negative for chest pain and leg swelling.   Gastrointestinal:  Negative for abdominal pain, constipation, diarrhea, nausea and vomiting.   Endocrine: Negative for cold intolerance, heat intolerance, polydipsia, polyphagia and polyuria.   Musculoskeletal:  Negative for arthralgias, myalgias and neck pain.   Skin:  Negative for rash and wound.   Neurological:  Negative for dizziness, syncope, speech difficulty, weakness, light-headedness and headaches.   Hematological:  Negative for adenopathy. Does not bruise/bleed easily.   Psychiatric/Behavioral:  Negative for confusion, dysphoric mood and sleep disturbance. The patient is not nervous/anxious.    All other systems reviewed and are negative.    /72 (BP Location: Left arm, Patient Position: Sitting, Cuff Size: Adult)   Pulse 71   Ht 152.4 cm (60\")   Wt 61.7 kg (136 lb)   SpO2 99%   BMI 26.56 kg/m²     Objective   Allergies   Allergen Reactions   • Aspirin Anaphylaxis   • Iron Shortness Of Breath   • Losartan Hives, Itching and Swelling   • Clonidine Unknown (See Comments)   • Eggs Or Egg-Derived Products    • Ace Inhibitors Rash   • Albuterol Rash   • Lisinopril Rash       Physical Exam  Vitals reviewed.   Constitutional:       Appearance: Normal appearance. She is well-developed.   HENT:      Head: Normocephalic.      Right Ear: Tympanic membrane normal.      Left Ear: Tympanic membrane normal.      Nose: Nose normal.   Eyes:      Conjunctiva/sclera: Conjunctivae normal.      Pupils: Pupils are equal, round, and reactive to light.   Neck:      Thyroid: No thyromegaly.      Vascular: No carotid bruit or JVD. "   Cardiovascular:      Rate and Rhythm: Normal rate and regular rhythm.   Pulmonary:      Effort: Pulmonary effort is normal.      Breath sounds: Normal breath sounds.   Abdominal:      General: Bowel sounds are normal.      Palpations: Abdomen is soft. There is no hepatomegaly, splenomegaly or mass.      Tenderness: There is no abdominal tenderness.   Musculoskeletal:         General: Normal range of motion.      Cervical back: Neck supple.      Right lower leg: No edema.      Left lower leg: No edema.   Skin:     General: Skin is warm and dry.   Neurological:      Mental Status: She is alert and oriented to person, place, and time.   Psychiatric:         Attention and Perception: Attention normal.         Mood and Affect: Mood normal.         Speech: Speech normal.         Behavior: Behavior normal. Behavior is cooperative.         Cognition and Memory: Cognition normal.         ECG 12 Lead    Date/Time: 11/2/2023 6:03 PM  Performed by: Margaret Hayes APRN    Authorized by: Margaret Hayes APRN  Comparison: compared with previous ECG   Similar to previous ECG  Rhythm: sinus rhythm  Rate: normal  BPM: 63  Conduction: left bundle branch block and non-specific intraventricular conduction delay  QRS axis: left    Clinical impression: abnormal EKG and myocardial infarction  Clinical impression comment: anteroseptal, indeterminate age        LABS  WBC   Date Value Ref Range Status   08/07/2023 7.79 3.40 - 10.80 10*3/mm3 Final   03/10/2023 9.86 3.70 - 10.30 10*3/uL Final     RBC   Date Value Ref Range Status   08/07/2023 4.37 3.77 - 5.28 10*6/mm3 Final   03/10/2023 4.32 3.90 - 5.20 10*6/uL Final     Hemoglobin   Date Value Ref Range Status   08/07/2023 11.8 (L) 12.0 - 15.9 g/dL Final   03/10/2023 11.6 11.2 - 15.7 g/dL Final     Hematocrit   Date Value Ref Range Status   08/07/2023 37.0 34.0 - 46.6 % Final   03/10/2023 36.2 34.0 - 45.0 % Final     MCV   Date Value Ref Range Status   08/07/2023 84.7 79.0 - 97.0 fL  Final   03/10/2023 84 79 - 98 fL Final     MCH   Date Value Ref Range Status   08/07/2023 27.0 26.6 - 33.0 pg Final   03/10/2023 26.9 26.0 - 32.0 pg Final     MCHC   Date Value Ref Range Status   08/07/2023 31.9 31.5 - 35.7 g/dL Final   03/10/2023 32.0 30.7 - 35.5 g/dL Final     RDW   Date Value Ref Range Status   08/07/2023 16.3 (H) 12.3 - 15.4 % Final   03/10/2023 17.5 (H) 11.5 - 14.5 % Final     RDW-SD   Date Value Ref Range Status   08/07/2023 50.6 37.0 - 54.0 fl Final     MPV   Date Value Ref Range Status   03/10/2023   Final     Comment:     Not Measured     Platelets   Date Value Ref Range Status   08/07/2023 185 140 - 450 10*3/mm3 Final   03/10/2023 224 155 - 369 10*3/uL Final     Neutrophil %   Date Value Ref Range Status   02/06/2023 49.2 42.7 - 76.0 % Final     Lymphocyte %   Date Value Ref Range Status   02/06/2023 38.7 19.6 - 45.3 % Final     Monocyte %   Date Value Ref Range Status   02/06/2023 7.2 5.0 - 12.0 % Final     Eosinophil %   Date Value Ref Range Status   02/06/2023 3.8 0.3 - 6.2 % Final     Basophil %   Date Value Ref Range Status   02/06/2023 0.9 0.0 - 1.5 % Final     Immature Grans %   Date Value Ref Range Status   05/18/2021 0.3 0.0 - 0.5 % Final     Neutrophils Absolute   Date Value Ref Range Status   08/07/2023 5.45 1.70 - 7.00 10*3/mm3 Final     Neutrophils, Absolute   Date Value Ref Range Status   02/06/2023 4.32 1.70 - 7.00 10*3/mm3 Final     Lymphocytes, Absolute   Date Value Ref Range Status   02/06/2023 3.39 (H) 0.70 - 3.10 10*3/mm3 Final     Monocytes, Absolute   Date Value Ref Range Status   02/06/2023 0.63 0.10 - 0.90 10*3/mm3 Final     Eosinophils Absolute   Date Value Ref Range Status   08/07/2023 0.08 0.00 - 0.40 10*3/mm3 Final     Eosinophils, Absolute   Date Value Ref Range Status   02/06/2023 0.33 0.00 - 0.40 10*3/mm3 Final     Basophils, Absolute   Date Value Ref Range Status   02/06/2023 0.08 0.00 - 0.20 10*3/mm3 Final     Immature Grans, Absolute   Date Value Ref Range  Status   05/18/2021 0.02 0.00 - 0.05 10*3/mm3 Final     nRBC   Date Value Ref Range Status   03/10/2023 0.0 <=0.0 per 100 WBCs Final   05/18/2021 0.0 0.0 - 0.2 /100 WBC Final       Total Cholesterol   Date Value Ref Range Status   08/07/2023 193 0 - 200 mg/dL Final     Triglycerides   Date Value Ref Range Status   08/07/2023 98 0 - 150 mg/dL Final     HDL Cholesterol   Date Value Ref Range Status   08/07/2023 79 (H) 40 - 60 mg/dL Final     LDL Cholesterol    Date Value Ref Range Status   08/07/2023 97 0 - 100 mg/dL Final         Assessment & Plan   Diagnoses and all orders for this visit:    1. Dyspnea, unspecified type (Primary)  -     Adult Transthoracic Echo Complete W/ Cont if Necessary Per Protocol; Future  -     ECG 12 Lead  EKG reviewed and discussed  Further evaluation will be arranged    2. Palpitations  -     Holter Monitor - 72 Hour Up To 15 Days; Future  Further evaluation will be arranged    3. Valvular heart disease  -     Adult Transthoracic Echo Complete W/ Cont if Necessary Per Protocol; Future  Further evaluation will be arranged    4. Essential hypertension  Controlled, continue current therapy, routine monitoring recommended, sodium restrictions recommended    5. Mixed hyperlipidemia  Continue on statin, heart healthy diet     6. Hypothyroidism (acquired)  Continue on synthroid    7. LBBB (left bundle branch block)  Chronic     Other orders  -     amLODIPine (NORVASC) 5 MG tablet; Take 1 tablet by mouth Daily.  Dispense: 90 tablet; Refill: 1  -     levothyroxine (SYNTHROID, LEVOTHROID) 50 MCG tablet; Take 1 tablet by mouth Daily.  Dispense: 30 tablet; Refill: 5        Follow up in 3 months, sooner if needed.

## 2023-11-20 RX ORDER — METOPROLOL SUCCINATE 25 MG/1
25 TABLET, EXTENDED RELEASE ORAL DAILY
Qty: 30 TABLET | Refills: 3 | Status: SHIPPED | OUTPATIENT
Start: 2023-11-20

## 2023-11-22 ENCOUNTER — TELEPHONE (OUTPATIENT)
Dept: CARDIOLOGY | Facility: CLINIC | Age: 73
End: 2023-11-22
Payer: MEDICARE

## 2023-11-22 NOTE — TELEPHONE ENCOUNTER
----- Message from ERICK Meng sent at 11/20/2023  4:39 PM EST -----  Holter monitor with multiple runs of atrial tachycardia without A-fib or pauses noted.  She did have an occasional premature beat.  Since she is symptomatic recommend starting low-dose beta-blocker with close monitoring of heart rate.

## 2023-11-22 NOTE — TELEPHONE ENCOUNTER
Called pt and informed her of the following per Margaret Hayes APRN   Holter monitor with multiple runs of atrial tachycardia without A-fib or pauses noted.  She did have an occasional premature beat.  Since she is symptomatic recommend starting low-dose beta-blocker with close monitoring of heart rate

## 2023-12-26 RX ORDER — EZETIMIBE 10 MG/1
TABLET ORAL
Qty: 90 TABLET | Refills: 1 | Status: SHIPPED | OUTPATIENT
Start: 2023-12-26

## 2024-01-23 RX ORDER — HYDRALAZINE HYDROCHLORIDE 50 MG/1
50 TABLET, FILM COATED ORAL 3 TIMES DAILY
Qty: 270 TABLET | Refills: 1 | Status: SHIPPED | OUTPATIENT
Start: 2024-01-23

## 2024-01-25 ENCOUNTER — HOSPITAL ENCOUNTER (OUTPATIENT)
Dept: CARDIOLOGY | Facility: HOSPITAL | Age: 74
Discharge: HOME | End: 2024-01-25
Admitting: NURSE PRACTITIONER
Payer: MEDICARE

## 2024-01-25 DIAGNOSIS — R06.00 DYSPNEA, UNSPECIFIED TYPE: ICD-10-CM

## 2024-01-25 DIAGNOSIS — I38 VALVULAR HEART DISEASE: ICD-10-CM

## 2024-01-25 PROCEDURE — 93306 TTE W/DOPPLER COMPLETE: CPT

## 2024-01-26 LAB
BH CV ECHO MEAS - ACS: 1.3 CM
BH CV ECHO MEAS - AO MAX PG: 14.7 MMHG
BH CV ECHO MEAS - AO MEAN PG: 8 MMHG
BH CV ECHO MEAS - AO ROOT DIAM: 2.45 CM
BH CV ECHO MEAS - AO V2 MAX: 192 CM/SEC
BH CV ECHO MEAS - AO V2 VTI: 43.4 CM
BH CV ECHO MEAS - EDV(CUBED): 79.5 ML
BH CV ECHO MEAS - EDV(MOD-SP4): 48.5 ML
BH CV ECHO MEAS - EF(MOD-SP4): 64.3 %
BH CV ECHO MEAS - ESV(CUBED): 32.8 ML
BH CV ECHO MEAS - ESV(MOD-SP4): 17.3 ML
BH CV ECHO MEAS - FS: 25.6 %
BH CV ECHO MEAS - IVS/LVPW: 1 CM
BH CV ECHO MEAS - IVSD: 1 CM
BH CV ECHO MEAS - LA DIMENSION: 4.2 CM
BH CV ECHO MEAS - LAT PEAK E' VEL: 8.5 CM/SEC
BH CV ECHO MEAS - LV MASS(C)D: 142.5 GRAMS
BH CV ECHO MEAS - LVIDD: 4.3 CM
BH CV ECHO MEAS - LVIDS: 3.2 CM
BH CV ECHO MEAS - LVOT AREA: 2.8 CM2
BH CV ECHO MEAS - LVOT DIAM: 1.9 CM
BH CV ECHO MEAS - LVPWD: 1 CM
BH CV ECHO MEAS - MED PEAK E' VEL: 5.6 CM/SEC
BH CV ECHO MEAS - MR MAX PG: 136.9 MMHG
BH CV ECHO MEAS - MR MAX VEL: 585 CM/SEC
BH CV ECHO MEAS - MR MEAN PG: 95 MMHG
BH CV ECHO MEAS - MR MEAN VEL: 460 CM/SEC
BH CV ECHO MEAS - MR VTI: 225 CM
BH CV ECHO MEAS - MV A MAX VEL: 116 CM/SEC
BH CV ECHO MEAS - MV DEC SLOPE: 600 CM/SEC2
BH CV ECHO MEAS - MV DEC TIME: 0.19 SEC
BH CV ECHO MEAS - MV E MAX VEL: 115 CM/SEC
BH CV ECHO MEAS - MV E/A: 0.99
BH CV ECHO MEAS - PA ACC TIME: 0.09 SEC
BH CV ECHO MEAS - RAP SYSTOLE: 10 MMHG
BH CV ECHO MEAS - RVSP: 39.5 MMHG
BH CV ECHO MEAS - SV(MOD-SP4): 31.2 ML
BH CV ECHO MEAS - TAPSE (>1.6): 2.26 CM
BH CV ECHO MEAS - TR MAX PG: 29.5 MMHG
BH CV ECHO MEAS - TR MAX VEL: 269.7 CM/SEC
BH CV ECHO MEASUREMENTS AVERAGE E/E' RATIO: 16.31
LEFT ATRIUM VOLUME INDEX: 45.1 ML/M2
LV EF 2D ECHO EST: 60 %

## 2024-02-01 ENCOUNTER — OFFICE VISIT (OUTPATIENT)
Dept: CARDIOLOGY | Facility: CLINIC | Age: 74
End: 2024-02-01
Payer: MEDICARE

## 2024-02-01 VITALS
OXYGEN SATURATION: 98 % | WEIGHT: 136.4 LBS | HEIGHT: 60 IN | HEART RATE: 72 BPM | SYSTOLIC BLOOD PRESSURE: 135 MMHG | BODY MASS INDEX: 26.78 KG/M2 | DIASTOLIC BLOOD PRESSURE: 75 MMHG

## 2024-02-01 DIAGNOSIS — I44.7 LBBB (LEFT BUNDLE BRANCH BLOCK): ICD-10-CM

## 2024-02-01 DIAGNOSIS — I10 ESSENTIAL HYPERTENSION: Primary | ICD-10-CM

## 2024-02-01 DIAGNOSIS — R00.1 BRADYCARDIA: ICD-10-CM

## 2024-02-01 DIAGNOSIS — E78.2 MIXED HYPERLIPIDEMIA: ICD-10-CM

## 2024-02-01 DIAGNOSIS — I25.10 CORONARY ARTERY DISEASE INVOLVING NATIVE CORONARY ARTERY OF NATIVE HEART WITHOUT ANGINA PECTORIS: ICD-10-CM

## 2024-02-01 DIAGNOSIS — I47.19 ATRIAL TACHYCARDIA: ICD-10-CM

## 2024-02-01 DIAGNOSIS — Z78.9 STATIN INTOLERANCE: ICD-10-CM

## 2024-02-01 DIAGNOSIS — E03.9 HYPOTHYROIDISM (ACQUIRED): ICD-10-CM

## 2024-02-01 DIAGNOSIS — I38 VALVULAR HEART DISEASE: ICD-10-CM

## 2024-02-01 NOTE — PROGRESS NOTES
subjective     Chief Complaint   Patient presents with    Hypertension    Results     ECHO ON 24       Problems Addressed This Visit  1. Essential hypertension    2. Mixed hyperlipidemia    3. Hypothyroidism (acquired)    4. Statin intolerance and (no rhabdomyolysis ,no liver function abnormalities)    5. Valvular heart disease trace AI, Mod MR    6. Nonobstructive coronary artery disease     7. LBBB (left bundle branch block)    8. Bradycardia        History of Present Illness    Chasity is 73 years old white female who is here for cardiology follow-up.  She seems to be doing very well with no new complaints.    Hypertension  Blood pressure is very well-controlled  She is taking Benicar 40 mg daily, Aldactone 12.5 mg daily, Toprol-XL 25 mg daily, hydralazine 50 mg 3 times daily and Norvasc 5 mg daily    Hyperlipidemia  Patient has statin intolerance and is taking Zetia and questron.  No drug side effects.    Hypothyroidism  She is taking Synthroid 50 mcg daily and is clinically euthyroid.    Moderate mitral regurgitation and nonobstructive coronary artery disease with left bundle branch block.   asymptomatic.  Heart rate is running low probably due to beta-blocker therapy asymptomatic no dizziness syncope or presyncope.      Past Surgical History:   Procedure Laterality Date    CARDIAC CATHETERIZATION      CARDIAC CATHETERIZATION  2015    CARDIOVASCULAR STRESS TEST  2015     SECTION      x 3     ECHO - CONVERTED  2015    THYROID SURGERY      age 14+ 15, for cysts     Family History   Problem Relation Age of Onset    Lung cancer Mother     Stroke Father      Past Medical History:   Diagnosis Date    Aortic insufficiency     Asthma     Disease of thyroid gland     Essential hypertension     SOB (shortness of breath)      Patient Active Problem List   Diagnosis    Essential hypertension    Hypothyroidism (acquired)    Gastroesophageal reflux disease without esophagitis    Hyperlipidemia     LBBB (left bundle branch block)    Valvular heart disease trace AI, Mod MR    Hypokalemia    Chest pain in adult    Statin intolerance and (no rhabdomyolysis ,no liver function abnormalities)    Nonobstructive coronary artery disease 2015    Bradycardia    Acute URI       Social History     Tobacco Use    Smoking status: Never    Smokeless tobacco: Never   Vaping Use    Vaping Use: Never used   Substance Use Topics    Alcohol use: No    Drug use: No       Allergies   Allergen Reactions    Aspirin Anaphylaxis    Iron Shortness Of Breath    Losartan Hives, Itching and Swelling    Clonidine Unknown (See Comments)    Eggs Or Egg-Derived Products     Ace Inhibitors Rash    Albuterol Rash    Lisinopril Rash       Current Outpatient Medications on File Prior to Visit   Medication Sig    amLODIPine (NORVASC) 5 MG tablet Take 1 tablet by mouth Daily.    cetirizine (ZyrTEC) 10 MG tablet Take 1 tablet by mouth Daily.    cholestyramine (QUESTRAN) 4 g packet Take 1 packet by mouth 2 (Two) Times a Day.    ezetimibe (ZETIA) 10 MG tablet TAKE 1 TABLET BY MOUTH ONCE DAILY    fluticasone-salmeterol (ADVAIR) 250-50 MCG/DOSE DISKUS Inhale 2 (two) times a day.    hydrALAZINE (APRESOLINE) 50 MG tablet TAKE 1 TABLET BY MOUTH 3 TIMES A DAY    levothyroxine (SYNTHROID, LEVOTHROID) 50 MCG tablet Take 1 tablet by mouth Daily.    metoprolol succinate XL (TOPROL-XL) 25 MG 24 hr tablet Take 1 tablet by mouth Daily.    Multiple Vitamin (MULTI VITAMIN DAILY PO) Take  by mouth.    nitroglycerin (NITROSTAT) 0.4 MG SL tablet     olmesartan (BENICAR) 40 MG tablet TAKE 1 TABLET BY MOUTH ONCE DAILY    spironolactone (ALDACTONE) 25 MG tablet TAKE 1/2 TABLET BY MOUTH ONCE DAILY     No current facility-administered medications on file prior to visit.         The following portions of the patient's history were reviewed and updated as appropriate: allergies, current medications, past family history, past medical history, past social history, past surgical  "history and problem list.    Review of Systems   All other systems reviewed and are negative.         Objective:     /75 (BP Location: Left arm, Patient Position: Sitting, Cuff Size: Adult)   Pulse 72   Ht 152.4 cm (60\")   Wt 61.9 kg (136 lb 6.4 oz)   SpO2 98%   BMI 26.64 kg/m²   Pulmonary:      Effort: Pulmonary effort is normal.      Breath sounds: Normal breath sounds. No stridor. No wheezing. No rhonchi. No rales.   Cardiovascular:      PMI at left midclavicular line. Bradycardia present. Regular rhythm. Normal S1. Normal S2.       Murmurs: There is no murmur.      No gallop.  No click. No rub.   Pulses:     Intact distal pulses.   Edema:     Peripheral edema absent.           Lab Review  Lab Results   Component Value Date     08/07/2023    K 4.8 08/07/2023     08/07/2023    BUN 18 08/07/2023    CREATININE 0.87 08/07/2023    GLUCOSE 90 08/07/2023    CALCIUM 9.5 08/07/2023    ALT 10 08/07/2023    ALKPHOS 109 08/07/2023    LABIL2 1.5 05/19/2015     Lab Results   Component Value Date    CKTOTAL 80 08/07/2023     Lab Results   Component Value Date    WBC 7.79 08/07/2023    HGB 11.8 (L) 08/07/2023    HCT 37.0 08/07/2023     08/07/2023     Lab Results   Component Value Date    INR 0.89 01/17/2017     Lab Results   Component Value Date    MG 2.0 07/23/2015     Lab Results   Component Value Date    TSH 2.860 02/06/2023     Lab Results   Component Value Date    BNP 28.0 09/05/2018     Lab Results   Component Value Date    CHLPL 192 05/19/2015    CHOL 193 08/07/2023    TRIG 98 08/07/2023    HDL 79 (H) 08/07/2023    VLDL 17 08/07/2023    LDL 97 08/07/2023     Lab Results   Component Value Date    LDL 97 08/07/2023    LDL 86 02/06/2023     No results found for: \"PROBNP\"            Procedures       I personally viewed and interpreted the patient's LAB data         Assessment:     1. Essential hypertension    2. Mixed hyperlipidemia    3. Hypothyroidism (acquired)    4. Statin intolerance and (no " rhabdomyolysis ,no liver function abnormalities)    5. Valvular heart disease trace AI, Mod MR    6. Nonobstructive coronary artery disease 2015    7. LBBB (left bundle branch block)    8. Bradycardia          Plan:   Hypertension  Blood pressure is very well-controlled patient was advised to continue Benicar 40 mg daily, Aldactone 25 mg daily, Toprol-XL 25 mg daily, hydralazine 50 mg 3 times daily and Norvasc 5 mg daily.    Atrial tachycardia is better patient asymptomatic with Toprol-XL 25 mg daily which was continued.  Hyperlipidemia  She is statin intolerant she will continue Zetia and questron.    Hypothyroidism clinically euthyroid she will continue Synthroid 50 mcg daily.  Nonobstructive coronary artery disease with chronic left bundle branch block asymptomatic we will continue to monitor.  Healthy lifestyle emphasized follow-up scheduled           No follow-ups on file.

## 2024-02-01 NOTE — LETTER
February 3, 2024     ERICK Mcdonnell  [address]  [place] [address]    Patient: Chasity Campos   YOB: 1950   Date of Visit: 2024       Dear ERICK Mcdonnell    Chasity Campos was in my office today. Below is a copy of my note.    If you have questions, please do not hesitate to call me. I look forward to following Chasity along with you.         Sincerely,        John Banda MD        CC: No Recipients    subjective     Chief Complaint   Patient presents with   • Hypertension   • Results     ECHO ON 24       Problems Addressed This Visit  No diagnosis found.    History of Present Illness          Past Surgical History:   Procedure Laterality Date   • CARDIAC CATHETERIZATION     • CARDIAC CATHETERIZATION  2015   • CARDIOVASCULAR STRESS TEST  2015   •  SECTION      x 3    • ECHO - CONVERTED  2015   • THYROID SURGERY      age 14+ 15, for cysts     Family History   Problem Relation Age of Onset   • Lung cancer Mother    • Stroke Father      Past Medical History:   Diagnosis Date   • Aortic insufficiency    • Asthma    • Disease of thyroid gland    • Essential hypertension    • SOB (shortness of breath)      Patient Active Problem List   Diagnosis   • Essential hypertension   • Hypothyroidism (acquired)   • Gastroesophageal reflux disease without esophagitis   • Hyperlipidemia   • LBBB (left bundle branch block)   • Valvular heart disease trace AI, Mod MR   • Hypokalemia   • Chest pain in adult   • Statin intolerance and (no rhabdomyolysis ,no liver function abnormalities)   • Nonobstructive coronary artery disease    • Bradycardia   • Acute URI       Social History     Tobacco Use   • Smoking status: Never   • Smokeless tobacco: Never   Vaping Use   • Vaping Use: Never used   Substance Use Topics   • Alcohol use: No   • Drug use: No       Allergies   Allergen Reactions   • Aspirin Anaphylaxis   • Iron Shortness Of Breath   •  "Losartan Hives, Itching and Swelling   • Clonidine Unknown (See Comments)   • Eggs Or Egg-Derived Products    • Ace Inhibitors Rash   • Albuterol Rash   • Lisinopril Rash       Current Outpatient Medications on File Prior to Visit   Medication Sig   • amLODIPine (NORVASC) 5 MG tablet Take 1 tablet by mouth Daily.   • cetirizine (ZyrTEC) 10 MG tablet Take 1 tablet by mouth Daily.   • cholestyramine (QUESTRAN) 4 g packet Take 1 packet by mouth 2 (Two) Times a Day.   • ezetimibe (ZETIA) 10 MG tablet TAKE 1 TABLET BY MOUTH ONCE DAILY   • fluticasone-salmeterol (ADVAIR) 250-50 MCG/DOSE DISKUS Inhale 2 (two) times a day.   • hydrALAZINE (APRESOLINE) 50 MG tablet TAKE 1 TABLET BY MOUTH 3 TIMES A DAY   • levothyroxine (SYNTHROID, LEVOTHROID) 50 MCG tablet Take 1 tablet by mouth Daily.   • metoprolol succinate XL (TOPROL-XL) 25 MG 24 hr tablet Take 1 tablet by mouth Daily.   • Multiple Vitamin (MULTI VITAMIN DAILY PO) Take  by mouth.   • nitroglycerin (NITROSTAT) 0.4 MG SL tablet    • olmesartan (BENICAR) 40 MG tablet TAKE 1 TABLET BY MOUTH ONCE DAILY   • spironolactone (ALDACTONE) 25 MG tablet TAKE 1/2 TABLET BY MOUTH ONCE DAILY     No current facility-administered medications on file prior to visit.         The following portions of the patient's history were reviewed and updated as appropriate: allergies, current medications, past family history, past medical history, past social history, past surgical history and problem list.    ROS       Objective:     /75 (BP Location: Left arm, Patient Position: Sitting, Cuff Size: Adult)   Pulse 72   Ht 152.4 cm (60\")   Wt 61.9 kg (136 lb 6.4 oz)   SpO2 98%   BMI 26.64 kg/m²   Physical Exam      Lab Review  Lab Results   Component Value Date     08/07/2023    K 4.8 08/07/2023     08/07/2023    BUN 18 08/07/2023    CREATININE 0.87 08/07/2023    GLUCOSE 90 08/07/2023    CALCIUM 9.5 08/07/2023    ALT 10 08/07/2023    ALKPHOS 109 08/07/2023    LABIL2 1.5 " "05/19/2015     Lab Results   Component Value Date    CKTOTAL 80 08/07/2023     Lab Results   Component Value Date    WBC 7.79 08/07/2023    HGB 11.8 (L) 08/07/2023    HCT 37.0 08/07/2023     08/07/2023     Lab Results   Component Value Date    INR 0.89 01/17/2017     Lab Results   Component Value Date    MG 2.0 07/23/2015     Lab Results   Component Value Date    TSH 2.860 02/06/2023     Lab Results   Component Value Date    BNP 28.0 09/05/2018     Lab Results   Component Value Date    CHLPL 192 05/19/2015    CHOL 193 08/07/2023    TRIG 98 08/07/2023    HDL 79 (H) 08/07/2023    VLDL 17 08/07/2023    LDL 97 08/07/2023     Lab Results   Component Value Date    LDL 97 08/07/2023    LDL 86 02/06/2023     No results found for: \"PROBNP\"            Procedures       I personally viewed and interpreted the patient's LAB data         Assessment:   No diagnosis found.      Plan:              No follow-ups on file.    "

## 2024-02-03 PROBLEM — I47.19 ATRIAL TACHYCARDIA: Status: ACTIVE | Noted: 2024-02-03

## 2024-02-08 RX ORDER — METOPROLOL SUCCINATE 25 MG/1
25 TABLET, EXTENDED RELEASE ORAL DAILY
Qty: 90 TABLET | Refills: 1 | Status: SHIPPED | OUTPATIENT
Start: 2024-02-08

## 2024-02-26 NOTE — ED PROVIDER NOTES
[facility] C[facility] OF THE [facility]  Hematology/Oncology Follow Up Note    Prema Pinto   1966   [id number]       02/27/24    Reason for Visit:  Follow-up for breast cancer on anastrozole.  Chief Complaint   Patient presents with   • Office Visit       History of Present Illness:   Prema Pinto is a 57 year old female seen today for breast cancer.  She underwent routine screening mammography, It showed a 1 cm irregular focal asymmetry in the left breast at 3:00 middle depth.  Additional views were recommended.  On diagnostic mammography the focal asymmetry with a spiculated margin in the left breast upper outer point aspect posterior depth was noted.  Ultrasound measured it at 1.1 x 1.1 x 0.6 cm.  This is highly suggestive of carcinoma.  The patient underwent ultrasound-guided biopsy that showed an invasive breast cancer, grade 1.  %, % and HER-2 negative.  Ki-67 was 10%.  She was evaluated by Dr. Arroyo, and she elected to undergo lumpectomy and sentinel node biopsy.  As she is postmenopausal and the lesion was small, she was deemed a candidate for IORT.    Pathology from her lumpectomy showed a 9 mm invasive ductal carcinoma, 7 mm of ductal carcinoma in situ were noted next to this.  Margins were negative.  4 sentinel lymph nodes were negative for carcinoma.  She is healing well.  Based on her pathology, no additional radiation was recommended.    Oncotype score 10. Chemotherapy was not indicated.    Initiated anastrozole in mid April 2021.    DEXA 08/20/2021 showed bone mineral density is in the normal density category. The patient has no increased fracture risk. FRAX not reported because: All T-scores for Spine Total or Hip Total or Femoral Neck at or above -1.     Diagnostic bilateral mammogram on 9/28/21 showed the 6 mm oval fat containing focal asymmetry in the left breast was consistent with a post-lumpectomy scar and was benign. There was no  Subjective   Patient presents to ER with chest pain of 2 days duration.        Chest Pain   Pain location:  Unable to specify  Pain quality: aching    Pain radiates to:  Does not radiate  Pain severity:  Mild  Onset quality:  Gradual  Duration:  2 days  Timing:  Intermittent  Progression:  Waxing and waning  Chronicity:  Recurrent  Relieved by:  Nothing  Worsened by:  Nothing      Review of Systems   Constitutional: Negative.    HENT: Negative.    Eyes: Negative.    Respiratory: Negative.    Cardiovascular: Positive for chest pain.   Endocrine: Negative.    Genitourinary: Negative.    Musculoskeletal: Negative.    Skin: Negative.    Allergic/Immunologic: Negative.    Neurological: Negative.    Hematological: Negative.    Psychiatric/Behavioral: Negative.        Past Medical History:   Diagnosis Date   • Aortic insufficiency    • Asthma    • Disease of thyroid gland    • Essential hypertension    • SOB (shortness of breath)        Allergies   Allergen Reactions   • Aspirin Anaphylaxis   • Eggs Or Egg-Derived Products    • Iron    • Losartan Hives, Itching and Swelling       Past Surgical History:   Procedure Laterality Date   • CARDIAC CATHETERIZATION     • CARDIAC CATHETERIZATION  2015   • CARDIOVASCULAR STRESS TEST  2015   •  SECTION      x 3    • ECHO - CONVERTED  2015   • THYROID SURGERY      age 16+ 17, for cysts       Family History   Problem Relation Age of Onset   • Lung cancer Mother    • Stroke Father        Social History     Social History   • Marital status:      Social History Main Topics   • Smoking status: Never Smoker   • Smokeless tobacco: Never Used   • Alcohol use No   • Drug use: No   • Sexual activity: Defer     Other Topics Concern   • Not on file           Objective   Physical Exam   Constitutional: She is oriented to person, place, and time. She appears well-developed and well-nourished.   HENT:   Head: Normocephalic and atraumatic.   Eyes: Pupils are equal, round,  and reactive to light. EOM are normal.   Neck: Normal range of motion.   Cardiovascular: Normal rate.    Pulmonary/Chest: Effort normal and breath sounds normal.   Abdominal: Soft.   Musculoskeletal: Normal range of motion.   Neurological: She is alert and oriented to person, place, and time.   Skin: Skin is warm.   Psychiatric: She has a normal mood and affect.   Nursing note and vitals reviewed.      Procedures           ED Course                  MDM      Final diagnoses:   None            Werner Raygoza MD  09/05/18 0519     mammographic evidence of malignancy. Follow-up left mammogram in 6 months recommended to demonstrate stability.    Diagnostic left mammogram on 3/25/22 showed the stable fat containing architectural distortion in the left breast was consistent with a post-lumpectomy scar and was benign. There was no mammographic evidence of malignancy. A follow-up mammogram in 6 months was recommended to demonstrate stability.    Date of Service February 26th, 2024:  Prema returns. A remote virtual  was utilized for this visit traci ([id number]).  She is feeling well overall. She has persisting right ankle pain and swelling. She discusses this is new without occurrences of injury. She has followed with her PCP for this, who did not refer her on to orthopedics.  This pain is sharp and feels like sharp which it then resolves quickly.  This will last between 5- 30 minutes. She will take tylenol for this sometimes, but discusses this resolves quickly.  This pain persists even when she is not using her foot. She has not tried using orthotic shoe inserts. No other joint pain. She does not have hot flashes. She has dry eyes, and redness. She is using natural tears. She is taking diltiazem and losartan 25mg.  She has not had any light headedness or dizziness. She has also started taking metformin for regulation of blood sugar. She has not had any diarrhea with use of metformin    She is taking sertraline, instead of lexapro. She has recently bene started on lisinopril and propanol, with stabilized blood pressure today.       Review of Systems   Constitutional: Negative.    HENT: Negative.     Eyes:  Positive for redness.   Respiratory: Negative.     Cardiovascular: Negative.    Gastrointestinal: Negative.    Endocrine: Negative.    Genitourinary: Negative.    Musculoskeletal:  Positive for arthralgias.   Skin: Negative.    Allergic/Immunologic: Negative.    Neurological: Negative.    Hematological: Negative.     Psychiatric/Behavioral: Negative.        Objective:     /75   Pulse 68   Temp (!) 72 °F (22.2 °C)   Resp 16   Ht 5' 3.5\" (1.613 m)   Wt 88.7 kg (195 lb 10.5 oz)   BMI 34.12 kg/m²   BSA 1.93 m²    ECOG [02/27/24 2055]   ECOG Performance Status 0     Physical Exam  Constitutional:       General: She is not in acute distress.     Appearance: She is well-developed. She is not diaphoretic.   HENT:      Head: Normocephalic.      Mouth/Throat:      Pharynx: No oropharyngeal exudate.   Eyes:      General: No scleral icterus.     Pupils: Pupils are equal, round, and reactive to light.   Neck:      Thyroid: No thyromegaly.   Cardiovascular:      Rate and Rhythm: Normal rate and regular rhythm.      Heart sounds: Normal heart sounds. No murmur heard.     No friction rub. No gallop.   Pulmonary:      Effort: Pulmonary effort is normal. No respiratory distress.      Breath sounds: Normal breath sounds. No wheezing.   Abdominal:      General: Bowel sounds are normal. There is no distension.      Palpations: Abdomen is soft.      Tenderness: There is no abdominal tenderness.   Musculoskeletal:         General: Normal range of motion.      Cervical back: Normal range of motion and neck supple.   Lymphadenopathy:      Cervical: No cervical adenopathy.   Skin:     General: Skin is warm and dry.      Findings: No erythema.   Neurological:      Mental Status: She is alert and oriented to person, place, and time.      Cranial Nerves: No cranial nerve deficit.     Breast: Bilateral exam without masses or adenopathy.    Lab Services on 09/19/2023   Component Date Value Ref Range Status   • Sodium 09/19/2023 135  135 - 145 mmol/L Final   • Potassium 09/19/2023 4.1  3.4 - 5.1 mmol/L Final   • Chloride 09/19/2023 106  97 - 110 mmol/L Final   • Carbon Dioxide 09/19/2023 25  21 - 32 mmol/L Final   • Anion Gap 09/19/2023 8  7 - 19 mmol/L Final   • Glucose 09/19/2023 112 (H)  70 - 99 mg/dL Final   • BUN 09/19/2023 15  6 - 20 mg/dL  Final   • Creatinine 09/19/2023 0.58  0.51 - 0.95 mg/dL Final   • Glomerular Filtration Rate 09/19/2023 >90  >=60 Final    eGFR results = or >60 mL/min/1.73m2 = Normal kidney function. Estimated GFR calculated using the CKD-EPI-R (2021) equation that does not include race in the creatinine calculation.   • BUN/Cr 09/19/2023 26 (H)  7 - 25 Final   • Calcium 09/19/2023 8.9  8.4 - 10.2 mg/dL Final   • Bilirubin, Total 09/19/2023 0.7  0.2 - 1.0 mg/dL Final   • GOT/AST 09/19/2023 23  <=37 Units/L Final   • GPT/ALT 09/19/2023 36  <64 Units/L Final   • Alkaline Phosphatase 09/19/2023 101  45 - 117 Units/L Final   • Albumin 09/19/2023 3.5 (L)  3.6 - 5.1 g/dL Final   • Protein, Total 09/19/2023 7.5  6.4 - 8.2 g/dL Final   • Globulin 09/19/2023 4.0  2.0 - 4.0 g/dL Final   • A/G Ratio 09/19/2023 0.9 (L)  1.0 - 2.4 Final   • Hemoglobin A1C 09/19/2023 6.1 (H)  4.5 - 5.6 % Final      Diabetic Screening  Non Diabetic:             <5.7%  Increased Risk:           5.7-6.4%  Diagnostic For Diabetes:  >6.4%    Diabetic Control  A1C%       eAG mg/dL  6.0            126  6.5            140  7.0            154  7.5            169  8.0            183  8.5            197  9.0            212  9.5            226  10.0           240   • Cholesterol 09/19/2023 147  <=199 mg/dL Final      Desirable         <200  Borderline High   200 to 239  High              >=240   • Triglycerides 09/19/2023 85  <=149 mg/dL Final      Normal            <150  Borderline High   150 to 199  High              200 to 499  Very High         >=500   • HDL 09/19/2023 69  >=50 mg/dL Final      Low              <40  Borderline Low   40 to 49  Near Optimal     50 to 59  Optimal          >=60   • LDL 09/19/2023 61  <=129 mg/dL Final      Optimal           <100  Near Optimal      100 to 129  Borderline High   130 to 159  High              160 to 189  Very High         >=190   • Non-HDL Cholesterol 09/19/2023 78  mg/dL Final      Therapeutic Target:  CHD and risk  equivalents  <130  Multiple risk factors     <160  0 to 1 risk factor        <190   • Cholesterol/ HDL Ratio 09/19/2023 2.1  <=4.4 Final   • TSH 09/19/2023 1.560  0.350 - 5.000 mcUnits/mL Final    Reference range in pregnancy (if applicable):  First Trimester 0.1 - 4.0 mcUnits/mL  Second Trimester 0.2 - 4.0 mcUnits/mL  Third Trimester 0.3 - 4.5 mcUnits/mL     • WBC 09/19/2023 6.3  4.2 - 11.0 K/mcL Final   • RBC 09/19/2023 4.86  4.00 - 5.20 mil/mcL Final   • HGB 09/19/2023 14.3  12.0 - 15.5 g/dL Final   • HCT 09/19/2023 42.6  36.0 - 46.5 % Final   • MCV 09/19/2023 87.7  78.0 - 100.0 fl Final   • MCH 09/19/2023 29.4  26.0 - 34.0 pg Final   • MCHC 09/19/2023 33.6  32.0 - 36.5 g/dL Final   • RDW-CV 09/19/2023 14.7  11.0 - 15.0 % Final   • RDW-SD 09/19/2023 47.6  39.0 - 50.0 fL Final   • PLT 09/19/2023 229  140 - 450 K/mcL Final   • NRBC 09/19/2023 0  <=0 /100 WBC Final   • Neutrophil, Percent 09/19/2023 46  % Final   • Lymphocytes, Percent 09/19/2023 45  % Final   • Mono, Percent 09/19/2023 7  % Final   • Eosinophils, Percent 09/19/2023 1  % Final   • Basophils, Percent 09/19/2023 1  % Final   • Immature Granulocytes 09/19/2023 0  % Final   • Absolute Neutrophils 09/19/2023 2.9  1.8 - 7.7 K/mcL Final   • Absolute Lymphocytes 09/19/2023 2.9  1.0 - 4.0 K/mcL Final   • Absolute Monocytes 09/19/2023 0.5  0.3 - 0.9 K/mcL Final   • Absolute Eosinophils  09/19/2023 0.1  0.0 - 0.5 K/mcL Final   • Absolute Basophils 09/19/2023 0.0  0.0 - 0.3 K/mcL Final   • Absolute Immature Granulocytes 09/19/2023 0.0  0.0 - 0.2 K/mcL Final   • Extra Tube 09/19/2023 Hold for Add Ons   Final      Imaging studies were reviewed with the following pertinent positives: No imaging reviewed.    ASSESSMENT AND PLAN:  1. Invasive ductal carcinoma with associated DCIS. 9 mm Grade 1, 7 mm in situ. T1b. Stage IA. Negative lymph nodes. ER/MS strongly postive (100%), Her-2 negative. Oncotype score 10.    Pt initiated anastrozole in mid-April 2021, with plan  to continue this for five years April 2026.    Recent changes to mineral density on DEXA, which is mild and likely related to anastrozole due to the lowering of estrogen levels having deleterious impact on bone denisty.  Will continue bone strengthening treatment with fosamax for two years.    Will get Xray of her right ankle given she has worsening R ankle pain. Will refer her to orthopedic surgeon for her foot and ankle.   The patient has dry eyes, with accomodates redness. Discuss using artifical tears for lubrication every 4-6 hours. Recommended she establish with eye dr for follow up with any vision changes.   Advised use of dry mouth mouthwash.Continue to monitor dry mouth and vaginal dryness.     Advised regular exercise to help with weight maintenance, prevent bone density loss, and lower relapse risk.   Pt has started on metformin for regulation of sugar. She has also started on additional cardioprotectant medicine.    Encouraged 150 minutes of exercise per week.  Previously directed patient to [facility] website for herbal supplement information. I strongly advised that she not take herbal supplements for hot flashes, as these commonly contain phyto-estrogens.  Continue mammogram every year. April 2023 was stable.      RTC MD in six months.    On 2/26/2024, IBeverly scribed the services personally performed by Norma Sanon MD     The documentation recorded by the scribe accurately and completely reflects the service(s) I personally performed and the decisions made by me.    Recent PHQ 2/9 Score    PHQ 2:  PHQ 2 Score Little interest or pleasure in activity?   2/26/2024   1:34 PM 0       PHQ 9:       Distress Score   [facility]      Distress Scale (0-10)

## 2024-03-18 RX ORDER — OLMESARTAN MEDOXOMIL 40 MG/1
TABLET ORAL
Qty: 90 TABLET | Refills: 1 | Status: SHIPPED | OUTPATIENT
Start: 2024-03-18

## 2024-04-23 ENCOUNTER — LAB (OUTPATIENT)
Dept: LAB | Facility: HOSPITAL | Age: 74
End: 2024-04-23
Payer: MEDICARE

## 2024-04-23 DIAGNOSIS — E03.9 HYPOTHYROIDISM (ACQUIRED): ICD-10-CM

## 2024-04-23 DIAGNOSIS — E78.2 MIXED HYPERLIPIDEMIA: ICD-10-CM

## 2024-04-23 LAB
ALBUMIN SERPL-MCNC: 4.2 G/DL (ref 3.5–5.2)
ALBUMIN/GLOB SERPL: 1.4 G/DL
ALP SERPL-CCNC: 102 U/L (ref 39–117)
ALT SERPL W P-5'-P-CCNC: 8 U/L (ref 1–33)
ANION GAP SERPL CALCULATED.3IONS-SCNC: 9.2 MMOL/L (ref 5–15)
AST SERPL-CCNC: 18 U/L (ref 1–32)
BASOPHILS # BLD AUTO: 0.07 10*3/MM3 (ref 0–0.2)
BASOPHILS NFR BLD AUTO: 0.9 % (ref 0–1.5)
BILIRUB SERPL-MCNC: 0.3 MG/DL (ref 0–1.2)
BUN SERPL-MCNC: 15 MG/DL (ref 8–23)
BUN/CREAT SERPL: 17.6 (ref 7–25)
CALCIUM SPEC-SCNC: 9.3 MG/DL (ref 8.6–10.5)
CHLORIDE SERPL-SCNC: 105 MMOL/L (ref 98–107)
CHOLEST SERPL-MCNC: 207 MG/DL (ref 0–200)
CO2 SERPL-SCNC: 25.8 MMOL/L (ref 22–29)
CREAT SERPL-MCNC: 0.85 MG/DL (ref 0.57–1)
DEPRECATED RDW RBC AUTO: 53.9 FL (ref 37–54)
EGFRCR SERPLBLD CKD-EPI 2021: 73.4 ML/MIN/1.73
EOSINOPHIL # BLD AUTO: 0.18 10*3/MM3 (ref 0–0.4)
EOSINOPHIL NFR BLD AUTO: 2.3 % (ref 0.3–6.2)
ERYTHROCYTE [DISTWIDTH] IN BLOOD BY AUTOMATED COUNT: 17.5 % (ref 12.3–15.4)
GLOBULIN UR ELPH-MCNC: 2.9 GM/DL
GLUCOSE SERPL-MCNC: 91 MG/DL (ref 65–99)
HCT VFR BLD AUTO: 36.4 % (ref 34–46.6)
HDLC SERPL-MCNC: 80 MG/DL (ref 40–60)
HGB BLD-MCNC: 11 G/DL (ref 12–15.9)
IMM GRANULOCYTES # BLD AUTO: 0.02 10*3/MM3 (ref 0–0.05)
IMM GRANULOCYTES NFR BLD AUTO: 0.3 % (ref 0–0.5)
LDLC SERPL CALC-MCNC: 113 MG/DL (ref 0–100)
LDLC/HDLC SERPL: 1.39 {RATIO}
LYMPHOCYTES # BLD AUTO: 2.88 10*3/MM3 (ref 0.7–3.1)
LYMPHOCYTES NFR BLD AUTO: 36 % (ref 19.6–45.3)
MCH RBC QN AUTO: 25.8 PG (ref 26.6–33)
MCHC RBC AUTO-ENTMCNC: 30.2 G/DL (ref 31.5–35.7)
MCV RBC AUTO: 85.2 FL (ref 79–97)
MONOCYTES # BLD AUTO: 0.7 10*3/MM3 (ref 0.1–0.9)
MONOCYTES NFR BLD AUTO: 8.8 % (ref 5–12)
NEUTROPHILS NFR BLD AUTO: 4.15 10*3/MM3 (ref 1.7–7)
NEUTROPHILS NFR BLD AUTO: 51.7 % (ref 42.7–76)
NRBC BLD AUTO-RTO: 0 /100 WBC (ref 0–0.2)
PLATELET # BLD AUTO: 227 10*3/MM3 (ref 140–450)
PMV BLD AUTO: 12.8 FL (ref 6–12)
POTASSIUM SERPL-SCNC: 4.6 MMOL/L (ref 3.5–5.2)
PROT SERPL-MCNC: 7.1 G/DL (ref 6–8.5)
RBC # BLD AUTO: 4.27 10*6/MM3 (ref 3.77–5.28)
SODIUM SERPL-SCNC: 140 MMOL/L (ref 136–145)
TRIGL SERPL-MCNC: 80 MG/DL (ref 0–150)
TSH SERPL DL<=0.05 MIU/L-ACNC: 4.17 UIU/ML (ref 0.27–4.2)
VLDLC SERPL-MCNC: 14 MG/DL (ref 5–40)
WBC NRBC COR # BLD AUTO: 8 10*3/MM3 (ref 3.4–10.8)

## 2024-04-23 PROCEDURE — 84443 ASSAY THYROID STIM HORMONE: CPT

## 2024-04-23 PROCEDURE — 80061 LIPID PANEL: CPT

## 2024-04-23 PROCEDURE — 36415 COLL VENOUS BLD VENIPUNCTURE: CPT

## 2024-04-23 PROCEDURE — 80053 COMPREHEN METABOLIC PANEL: CPT

## 2024-04-23 PROCEDURE — 85025 COMPLETE CBC W/AUTO DIFF WBC: CPT

## 2024-04-29 RX ORDER — SPIRONOLACTONE 25 MG/1
12.5 TABLET ORAL DAILY
Qty: 45 TABLET | Refills: 1 | Status: SHIPPED | OUTPATIENT
Start: 2024-04-29

## 2024-05-30 ENCOUNTER — OFFICE VISIT (OUTPATIENT)
Dept: CARDIOLOGY | Facility: CLINIC | Age: 74
End: 2024-05-30
Payer: MEDICARE

## 2024-05-30 VITALS
SYSTOLIC BLOOD PRESSURE: 128 MMHG | DIASTOLIC BLOOD PRESSURE: 74 MMHG | WEIGHT: 137 LBS | BODY MASS INDEX: 26.9 KG/M2 | HEIGHT: 60 IN | OXYGEN SATURATION: 98 % | HEART RATE: 64 BPM

## 2024-05-30 DIAGNOSIS — I47.19 ATRIAL TACHYCARDIA: ICD-10-CM

## 2024-05-30 DIAGNOSIS — G89.29 CHRONIC CHEST PAIN WITH LOW TO MODERATE RISK FOR CAD: ICD-10-CM

## 2024-05-30 DIAGNOSIS — R07.9 CHRONIC CHEST PAIN WITH LOW TO MODERATE RISK FOR CAD: ICD-10-CM

## 2024-05-30 DIAGNOSIS — E78.2 MIXED HYPERLIPIDEMIA: ICD-10-CM

## 2024-05-30 DIAGNOSIS — R07.9 CHRONIC CHEST PAIN WITH LOW TO MODERATE RISK FOR CAD: Primary | ICD-10-CM

## 2024-05-30 DIAGNOSIS — Z91.89 CHRONIC CHEST PAIN WITH LOW TO MODERATE RISK FOR CAD: Primary | ICD-10-CM

## 2024-05-30 DIAGNOSIS — E03.9 HYPOTHYROIDISM (ACQUIRED): ICD-10-CM

## 2024-05-30 DIAGNOSIS — I10 ESSENTIAL HYPERTENSION: ICD-10-CM

## 2024-05-30 DIAGNOSIS — G89.29 CHRONIC CHEST PAIN WITH LOW TO MODERATE RISK FOR CAD: Primary | ICD-10-CM

## 2024-05-30 DIAGNOSIS — Z91.89 CHRONIC CHEST PAIN WITH LOW TO MODERATE RISK FOR CAD: ICD-10-CM

## 2024-05-30 PROCEDURE — 93000 ELECTROCARDIOGRAM COMPLETE: CPT | Performed by: INTERNAL MEDICINE

## 2024-05-30 PROCEDURE — 3078F DIAST BP <80 MM HG: CPT | Performed by: INTERNAL MEDICINE

## 2024-05-30 PROCEDURE — 3074F SYST BP LT 130 MM HG: CPT | Performed by: INTERNAL MEDICINE

## 2024-05-30 PROCEDURE — 99214 OFFICE O/P EST MOD 30 MIN: CPT | Performed by: INTERNAL MEDICINE

## 2024-05-30 RX ORDER — CHOLESTYRAMINE 4 G/9G
1 POWDER, FOR SUSPENSION ORAL 2 TIMES DAILY
Qty: 180 PACKET | Refills: 3 | Status: SHIPPED | OUTPATIENT
Start: 2024-05-30

## 2024-06-19 RX ORDER — EZETIMIBE 10 MG/1
TABLET ORAL
Qty: 90 TABLET | Refills: 1 | Status: SHIPPED | OUTPATIENT
Start: 2024-06-19

## 2024-07-30 RX ORDER — HYDRALAZINE HYDROCHLORIDE 50 MG/1
50 TABLET, FILM COATED ORAL 3 TIMES DAILY
Qty: 270 TABLET | Refills: 1 | Status: SHIPPED | OUTPATIENT
Start: 2024-07-30

## 2024-09-04 ENCOUNTER — OFFICE VISIT (OUTPATIENT)
Dept: CARDIOLOGY | Facility: CLINIC | Age: 74
End: 2024-09-04
Payer: MEDICARE

## 2024-09-04 VITALS
HEART RATE: 74 BPM | OXYGEN SATURATION: 98 % | HEIGHT: 60 IN | WEIGHT: 134 LBS | SYSTOLIC BLOOD PRESSURE: 138 MMHG | DIASTOLIC BLOOD PRESSURE: 86 MMHG | BODY MASS INDEX: 26.31 KG/M2

## 2024-09-04 DIAGNOSIS — Z78.9 STATIN INTOLERANCE: ICD-10-CM

## 2024-09-04 DIAGNOSIS — R07.9 CHRONIC CHEST PAIN WITH LOW TO MODERATE RISK FOR CAD: Primary | ICD-10-CM

## 2024-09-04 DIAGNOSIS — I44.7 LBBB (LEFT BUNDLE BRANCH BLOCK): ICD-10-CM

## 2024-09-04 DIAGNOSIS — E78.2 MIXED HYPERLIPIDEMIA: ICD-10-CM

## 2024-09-04 DIAGNOSIS — G89.29 CHRONIC CHEST PAIN WITH LOW TO MODERATE RISK FOR CAD: Primary | ICD-10-CM

## 2024-09-04 DIAGNOSIS — I10 ESSENTIAL HYPERTENSION: ICD-10-CM

## 2024-09-04 DIAGNOSIS — E03.9 HYPOTHYROIDISM (ACQUIRED): ICD-10-CM

## 2024-09-04 DIAGNOSIS — Z91.89 CHRONIC CHEST PAIN WITH LOW TO MODERATE RISK FOR CAD: Primary | ICD-10-CM

## 2024-09-04 DIAGNOSIS — I47.19 ATRIAL TACHYCARDIA: ICD-10-CM

## 2024-09-04 PROCEDURE — 99214 OFFICE O/P EST MOD 30 MIN: CPT | Performed by: INTERNAL MEDICINE

## 2024-09-04 PROCEDURE — 3079F DIAST BP 80-89 MM HG: CPT | Performed by: INTERNAL MEDICINE

## 2024-09-04 PROCEDURE — 3075F SYST BP GE 130 - 139MM HG: CPT | Performed by: INTERNAL MEDICINE

## 2024-09-04 RX ORDER — METOPROLOL SUCCINATE 25 MG/1
37.5 TABLET, EXTENDED RELEASE ORAL DAILY
Qty: 135 TABLET | Refills: 1 | Status: SHIPPED | OUTPATIENT
Start: 2024-09-04

## 2024-09-04 NOTE — PROGRESS NOTES
subjective     Chief Complaint   Patient presents with    Fatigue     States her bp has been running low x 3 weeks    Chest Pain     Pt needs her stress test rescheduled       Problems Addressed This Visit  1. Chronic chest pain with low to moderate risk for CAD    2. Essential hypertension    3. Mixed hyperlipidemia    4. Atrial tachycardia    5. Hypothyroidism (acquired)    6. LBBB (left bundle branch block)    7. Statin intolerance and (no rhabdomyolysis ,no liver function abnormalities)        History of Present Illness  Patient is 74 years old white female who complains of chest pain.  She was post to have a stress test done but did not have it.    According to her a couple of days prior to stress test she had a spell of sudden weakness.  According to her life ran out of her from head to toes and she almost hit the ground.  No loss of consciousness.    There was no chest pain.  No palpitations she just felt weak.  She did not feel comfortable to have stress test done.    According to her palpitations are much better with metoprolol 25 mg daily we will increase it to 37.5.    Blood pressure is very well-controlled initially her blood pressure was running low according to her.  She is taking Benicar, hydralazine, Aldactone, Norvasc and metoprolol.    Hypothyroidism on Synthroid replacement therapy clinically euthyroid.    Past Surgical History:   Procedure Laterality Date    CARDIAC CATHETERIZATION      CARDIAC CATHETERIZATION  2015    CARDIOVASCULAR STRESS TEST  2015     SECTION      x 3     ECHO - CONVERTED  2015    THYROID SURGERY      age 16+ 17, for cysts     Family History   Problem Relation Age of Onset    Lung cancer Mother     Stroke Father      Past Medical History:   Diagnosis Date    Aortic insufficiency     Asthma     Disease of thyroid gland     Essential hypertension     SOB (shortness of breath)      Patient Active Problem List   Diagnosis    Essential hypertension     Hypothyroidism (acquired)    Gastroesophageal reflux disease without esophagitis    Hyperlipidemia    LBBB (left bundle branch block)    Valvular heart disease, mild mitral and tricuspid regurgitation with moderate pulmonary hypertension.    Hypokalemia    Chronic chest pain with low to moderate risk for CAD    Statin intolerance and (no rhabdomyolysis ,no liver function abnormalities)    Nonobstructive coronary artery disease 2015    Bradycardia    Acute URI    Atrial tachycardia       Social History     Tobacco Use    Smoking status: Never    Smokeless tobacco: Never   Vaping Use    Vaping status: Never Used   Substance Use Topics    Alcohol use: No    Drug use: No       Allergies   Allergen Reactions    Aspirin Anaphylaxis    Iron Shortness Of Breath    Losartan Hives, Itching and Swelling    Clonidine Unknown (See Comments)    Egg-Derived Products     Ace Inhibitors Rash    Albuterol Rash    Lisinopril Rash       Current Outpatient Medications on File Prior to Visit   Medication Sig    amLODIPine (NORVASC) 5 MG tablet Take 1 tablet by mouth Daily.    cetirizine (ZyrTEC) 10 MG tablet Take 1 tablet by mouth Daily.    cholestyramine (QUESTRAN) 4 g packet Take 1 packet by mouth 2 (Two) Times a Day.    ezetimibe (ZETIA) 10 MG tablet TAKE 1 TABLET BY MOUTH ONCE DAILY    fluticasone-salmeterol (ADVAIR) 250-50 MCG/DOSE DISKUS Inhale 2 (two) times a day.    hydrALAZINE (APRESOLINE) 50 MG tablet TAKE 1 TABLET BY MOUTH 3 TIMES A DAY    levothyroxine (SYNTHROID, LEVOTHROID) 50 MCG tablet Take 1 tablet by mouth Daily.    Multiple Vitamin (MULTI VITAMIN DAILY PO) Take  by mouth.    nitroglycerin (NITROSTAT) 0.4 MG SL tablet     olmesartan (BENICAR) 40 MG tablet TAKE 1 TABLET BY MOUTH ONCE DAILY    spironolactone (ALDACTONE) 25 MG tablet TAKE 1/2 TABLET BY MOUTH ONCE DAILY    [DISCONTINUED] metoprolol succinate XL (TOPROL-XL) 25 MG 24 hr tablet Take 1 tablet by mouth Daily.     No current facility-administered medications on  file prior to visit.     (Not in a hospital admission)      Results for orders placed during the hospital encounter of 01/25/24    Adult Transthoracic Echo Complete W/ Cont if Necessary Per Protocol    Interpretation Summary    Normal left ventricular cavity size with borderline concentric hypertrophy noted.    Left ventricular systolic function is normal.  LV ejection fraction is around 60%.    Left ventricular diastolic function is consistent with (grade I) impaired relaxation.    Aortic valve is trileaflet valve, there is no evidence of aortic stenosis or aortic regurgitation detected.    Mild mitral regurgitation with mildly dilated left atrium noted.    Mild tricuspid regurgitation with mild to moderate pulmonary hypertension is noted.  Peak RV systolic pressure is around 50 mmHg.    Mild pulmonic regurgitation is noted.    No pericardial effusion detected.    Results for orders placed during the hospital encounter of 02/10/17    Stress Test With Myocardial Perfusion One Day    Interpretation Summary  · Left ventricular ejection fraction is hyperdynamic (Calculated EF > 70%).  · Myocardial perfusion imaging indicates a normal myocardial perfusion study with no evidence of ischemia.  · Impressions are consistent with a low risk study.  · Findings consistent with a normal ECG stress test.          The following portions of the patient's history were reviewed and updated as appropriate: allergies, current medications, past family history, past medical history, past social history, past surgical history and problem list.    Review of Systems   Constitutional: Negative. Positive for malaise/fatigue.   HENT: Negative.  Negative for congestion.    Eyes: Negative.    Cardiovascular: Negative.  Negative for chest pain, cyanosis, dyspnea on exertion, irregular heartbeat, leg swelling, near-syncope, orthopnea, palpitations, paroxysmal nocturnal dyspnea and syncope.   Respiratory: Negative.  Negative for shortness of  "breath.    Hematologic/Lymphatic: Negative.    Musculoskeletal: Negative.    Gastrointestinal: Negative.    Neurological: Negative.  Negative for headaches.          Objective:     /86 (BP Location: Left arm, Patient Position: Sitting, Cuff Size: Adult)   Pulse 74   Ht 152.4 cm (60\")   Wt 60.8 kg (134 lb)   SpO2 98%   BMI 26.17 kg/m²   Pulmonary:      Effort: Pulmonary effort is normal.      Breath sounds: Normal breath sounds. No stridor. No wheezing. No rhonchi. No rales.   Cardiovascular:      PMI at left midclavicular line. Normal rate. Regular rhythm. Normal S1. Normal S2.       Murmurs: There is no murmur.      No gallop.  No click. No rub.   Pulses:     Intact distal pulses.   Edema:     Peripheral edema absent.           Lab Review  Lab Results   Component Value Date     04/23/2024    K 4.6 04/23/2024     04/23/2024    BUN 15 04/23/2024    CREATININE 0.85 04/23/2024    GLUCOSE 91 04/23/2024    CALCIUM 9.3 04/23/2024    ALT 8 04/23/2024    ALKPHOS 102 04/23/2024    LABIL2 1.5 05/19/2015     Lab Results   Component Value Date    CKTOTAL 80 08/07/2023     Lab Results   Component Value Date    WBC 8.00 04/23/2024    HGB 11.0 (L) 04/23/2024    HCT 36.4 04/23/2024     04/23/2024     Lab Results   Component Value Date    INR 0.89 01/17/2017     Lab Results   Component Value Date    MG 2.0 07/23/2015     Lab Results   Component Value Date    TSH 4.170 04/23/2024     Lab Results   Component Value Date    BNP 28.0 09/05/2018     Lab Results   Component Value Date    CHLPL 192 05/19/2015    CHOL 207 (H) 04/23/2024    TRIG 80 04/23/2024    HDL 80 (H) 04/23/2024    VLDL 14 04/23/2024     (H) 04/23/2024     Lab Results   Component Value Date     (H) 04/23/2024    LDL 97 08/07/2023     No results found for: \"PROBNP\"            Procedures       I personally viewed and interpreted the patient's LAB data         Assessment:     1. Chronic chest pain with low to moderate risk for CAD "    2. Essential hypertension    3. Mixed hyperlipidemia    4. Atrial tachycardia    5. Hypothyroidism (acquired)    6. LBBB (left bundle branch block)    7. Statin intolerance and (no rhabdomyolysis ,no liver function abnormalities)          Plan:     Chasity is 74 years old white female who complains of chest pain.  She has multiple risk factors for coronary artery disease.  Stress test was scheduled but she did not have it done however she is willing to try again.  Will arrange Lexiscan stress test.    Hypertension  Blood pressure is elevated today  She was advised to continue Benicar, hydralazine, Aldactone, Norvasc and metoprolol..    Palpitations are better with metoprolol ER 25 mg daily she was advised to increase it to 37.5 mg daily.    She is clinically euthyroid Synthroid was continued.    Hyperlipidemia with statin intolerance she is taking questron and Zetia will check lab work next visit    Follow-up scheduled        No follow-ups on file.

## 2024-09-04 NOTE — LETTER
2024     ERICK Mcdonnell  [address] [place] [address]    Patient: Chasity Campos   YOB: 1950   Date of Visit: 2024       Dear ERICK Mcdonnell    Chasity Campos was in my office today. Below is a copy of my note.    If you have questions, please do not hesitate to call me. I look forward to following Chasity along with you.         Sincerely,        John Banda MD        CC: No Recipients    subjective     Chief Complaint   Patient presents with   • Fatigue     States her bp has been running low x 3 weeks   • Chest Pain     Pt needs her stress test rescheduled       Problems Addressed This Visit  No diagnosis found.    History of Present Illness          Past Surgical History:   Procedure Laterality Date   • CARDIAC CATHETERIZATION     • CARDIAC CATHETERIZATION  2015   • CARDIOVASCULAR STRESS TEST  2015   •  SECTION      x 3    • ECHO - CONVERTED  2015   • THYROID SURGERY      age 14+ 15, for cysts     Family History   Problem Relation Age of Onset   • Lung cancer Mother    • Stroke Father      Past Medical History:   Diagnosis Date   • Aortic insufficiency    • Asthma    • Disease of thyroid gland    • Essential hypertension    • SOB (shortness of breath)      Patient Active Problem List   Diagnosis   • Essential hypertension   • Hypothyroidism (acquired)   • Gastroesophageal reflux disease without esophagitis   • Hyperlipidemia   • LBBB (left bundle branch block)   • Valvular heart disease, mild mitral and tricuspid regurgitation with moderate pulmonary hypertension.   • Hypokalemia   • Chronic chest pain with low to moderate risk for CAD   • Statin intolerance and (no rhabdomyolysis ,no liver function abnormalities)   • Nonobstructive coronary artery disease    • Bradycardia   • Acute URI   • Atrial tachycardia       Social History     Tobacco Use   • Smoking status: Never   • Smokeless tobacco: Never   Vaping Use    • Vaping status: Never Used   Substance Use Topics   • Alcohol use: No   • Drug use: No       Allergies   Allergen Reactions   • Aspirin Anaphylaxis   • Iron Shortness Of Breath   • Losartan Hives, Itching and Swelling   • Clonidine Unknown (See Comments)   • Egg-Derived Products    • Ace Inhibitors Rash   • Albuterol Rash   • Lisinopril Rash       Current Outpatient Medications on File Prior to Visit   Medication Sig   • amLODIPine (NORVASC) 5 MG tablet Take 1 tablet by mouth Daily.   • cetirizine (ZyrTEC) 10 MG tablet Take 1 tablet by mouth Daily.   • cholestyramine (QUESTRAN) 4 g packet Take 1 packet by mouth 2 (Two) Times a Day.   • ezetimibe (ZETIA) 10 MG tablet TAKE 1 TABLET BY MOUTH ONCE DAILY   • fluticasone-salmeterol (ADVAIR) 250-50 MCG/DOSE DISKUS Inhale 2 (two) times a day.   • hydrALAZINE (APRESOLINE) 50 MG tablet TAKE 1 TABLET BY MOUTH 3 TIMES A DAY   • levothyroxine (SYNTHROID, LEVOTHROID) 50 MCG tablet Take 1 tablet by mouth Daily.   • metoprolol succinate XL (TOPROL-XL) 25 MG 24 hr tablet Take 1 tablet by mouth Daily.   • Multiple Vitamin (MULTI VITAMIN DAILY PO) Take  by mouth.   • nitroglycerin (NITROSTAT) 0.4 MG SL tablet    • olmesartan (BENICAR) 40 MG tablet TAKE 1 TABLET BY MOUTH ONCE DAILY   • spironolactone (ALDACTONE) 25 MG tablet TAKE 1/2 TABLET BY MOUTH ONCE DAILY     No current facility-administered medications on file prior to visit.     (Not in a hospital admission)      Results for orders placed during the hospital encounter of 01/25/24    Adult Transthoracic Echo Complete W/ Cont if Necessary Per Protocol    Interpretation Summary  •  Normal left ventricular cavity size with borderline concentric hypertrophy noted.  •  Left ventricular systolic function is normal.  LV ejection fraction is around 60%.  •  Left ventricular diastolic function is consistent with (grade I) impaired relaxation.  •  Aortic valve is trileaflet valve, there is no evidence of aortic stenosis or aortic  "regurgitation detected.  •  Mild mitral regurgitation with mildly dilated left atrium noted.  •  Mild tricuspid regurgitation with mild to moderate pulmonary hypertension is noted.  Peak RV systolic pressure is around 50 mmHg.  •  Mild pulmonic regurgitation is noted.  •  No pericardial effusion detected.    Results for orders placed during the hospital encounter of 02/10/17    Stress Test With Myocardial Perfusion One Day    Interpretation Summary  · Left ventricular ejection fraction is hyperdynamic (Calculated EF > 70%).  · Myocardial perfusion imaging indicates a normal myocardial perfusion study with no evidence of ischemia.  · Impressions are consistent with a low risk study.  · Findings consistent with a normal ECG stress test.          The following portions of the patient's history were reviewed and updated as appropriate: allergies, current medications, past family history, past medical history, past social history, past surgical history and problem list.    ROS       Objective:     /86 (BP Location: Left arm, Patient Position: Sitting, Cuff Size: Adult)   Pulse 74   Ht 152.4 cm (60\")   Wt 60.8 kg (134 lb)   SpO2 98%   BMI 26.17 kg/m²   Physical Exam      Lab Review  Lab Results   Component Value Date     04/23/2024    K 4.6 04/23/2024     04/23/2024    BUN 15 04/23/2024    CREATININE 0.85 04/23/2024    GLUCOSE 91 04/23/2024    CALCIUM 9.3 04/23/2024    ALT 8 04/23/2024    ALKPHOS 102 04/23/2024    LABIL2 1.5 05/19/2015     Lab Results   Component Value Date    CKTOTAL 80 08/07/2023     Lab Results   Component Value Date    WBC 8.00 04/23/2024    HGB 11.0 (L) 04/23/2024    HCT 36.4 04/23/2024     04/23/2024     Lab Results   Component Value Date    INR 0.89 01/17/2017     Lab Results   Component Value Date    MG 2.0 07/23/2015     Lab Results   Component Value Date    TSH 4.170 04/23/2024     Lab Results   Component Value Date    BNP 28.0 09/05/2018     Lab Results   Component " "Value Date    CHLPL 192 05/19/2015    CHOL 207 (H) 04/23/2024    TRIG 80 04/23/2024    HDL 80 (H) 04/23/2024    VLDL 14 04/23/2024     (H) 04/23/2024     Lab Results   Component Value Date     (H) 04/23/2024    LDL 97 08/07/2023     No results found for: \"PROBNP\"            Procedures       I personally viewed and interpreted the patient's LAB data         Assessment:   No diagnosis found.      Plan:              No follow-ups on file.    "

## 2024-09-16 RX ORDER — LEVOTHYROXINE SODIUM 50 UG/1
TABLET ORAL
Qty: 30 TABLET | Refills: 3 | Status: SHIPPED | OUTPATIENT
Start: 2024-09-16

## 2024-09-19 RX ORDER — OLMESARTAN MEDOXOMIL 40 MG/1
TABLET ORAL
Qty: 90 TABLET | Refills: 1 | Status: SHIPPED | OUTPATIENT
Start: 2024-09-19

## 2024-10-25 ENCOUNTER — HOSPITAL ENCOUNTER (OUTPATIENT)
Dept: CARDIOLOGY | Facility: HOSPITAL | Age: 74
Discharge: HOME | End: 2024-10-25
Payer: MEDICARE

## 2024-10-25 ENCOUNTER — HOSPITAL ENCOUNTER (OUTPATIENT)
Dept: NUCLEAR MEDICINE | Facility: HOSPITAL | Age: 74
Discharge: HOME | End: 2024-10-25
Payer: MEDICARE

## 2024-10-25 ENCOUNTER — TELEPHONE (OUTPATIENT)
Dept: CARDIOLOGY | Facility: CLINIC | Age: 74
End: 2024-10-25

## 2024-10-25 DIAGNOSIS — G89.29 CHRONIC CHEST PAIN WITH LOW TO MODERATE RISK FOR CAD: ICD-10-CM

## 2024-10-25 DIAGNOSIS — Z91.89 CHRONIC CHEST PAIN WITH LOW TO MODERATE RISK FOR CAD: ICD-10-CM

## 2024-10-25 DIAGNOSIS — R07.9 CHRONIC CHEST PAIN WITH LOW TO MODERATE RISK FOR CAD: ICD-10-CM

## 2024-10-25 LAB
BH CV NUCLEAR PRIOR STUDY: 3
BH CV REST NUCLEAR ISOTOPE DOSE: 7.8 MCI
BH CV STRESS BP STAGE 1: NORMAL
BH CV STRESS COMMENTS STAGE 1: NORMAL
BH CV STRESS DOSE REGADENOSON STAGE 1: 0.4
BH CV STRESS DURATION MIN STAGE 1: 0
BH CV STRESS DURATION SEC STAGE 1: 10
BH CV STRESS HR STAGE 1: 88
BH CV STRESS NUCLEAR ISOTOPE DOSE: 24 MCI
BH CV STRESS PROTOCOL 1: NORMAL
BH CV STRESS RECOVERY BP: NORMAL MMHG
BH CV STRESS RECOVERY HR: 79 BPM
BH CV STRESS STAGE 1: 1
LV EF NUC BP: 74 %
MAXIMAL PREDICTED HEART RATE: 148 BPM
PERCENT MAX PREDICTED HR: 59.46 %
STRESS BASELINE BP: NORMAL MMHG
STRESS BASELINE HR: 68 BPM
STRESS PERCENT HR: 70 %
STRESS POST PEAK BP: NORMAL MMHG
STRESS POST PEAK HR: 88 BPM
STRESS TARGET HR: 126 BPM

## 2024-10-25 PROCEDURE — A9500 TC99M SESTAMIBI: HCPCS | Performed by: INTERNAL MEDICINE

## 2024-10-25 PROCEDURE — 78452 HT MUSCLE IMAGE SPECT MULT: CPT

## 2024-10-25 PROCEDURE — 0 TECHNETIUM SESTAMIBI: Performed by: INTERNAL MEDICINE

## 2024-10-25 PROCEDURE — 93017 CV STRESS TEST TRACING ONLY: CPT

## 2024-10-25 PROCEDURE — 25010000002 REGADENOSON 0.4 MG/5ML SOLUTION: Performed by: INTERNAL MEDICINE

## 2024-10-25 RX ORDER — REGADENOSON 0.08 MG/ML
0.4 INJECTION, SOLUTION INTRAVENOUS
Status: COMPLETED | OUTPATIENT
Start: 2024-10-25 | End: 2024-10-25

## 2024-10-25 RX ADMIN — TECHNETIUM TC 99M SESTAMIBI 1 DOSE: 1 INJECTION INTRAVENOUS at 07:22

## 2024-10-25 RX ADMIN — TECHNETIUM TC 99M SESTAMIBI 1 DOSE: 1 INJECTION INTRAVENOUS at 09:00

## 2024-10-25 RX ADMIN — REGADENOSON 0.4 MG: 0.08 INJECTION, SOLUTION INTRAVENOUS at 09:00

## 2024-10-25 NOTE — TELEPHONE ENCOUNTER
TRIED TO CALL PT, NO ANSWER, NO VM SET UP  ----- Message from John Banda sent at 10/25/2024 12:46 PM EDT -----  Stress test is normal,

## 2024-11-07 RX ORDER — SPIRONOLACTONE 25 MG/1
12.5 TABLET ORAL DAILY
Qty: 45 TABLET | Refills: 1 | Status: SHIPPED | OUTPATIENT
Start: 2024-11-07

## 2024-12-23 RX ORDER — EZETIMIBE 10 MG/1
TABLET ORAL
Qty: 90 TABLET | Refills: 1 | Status: SHIPPED | OUTPATIENT
Start: 2024-12-23

## 2024-12-23 RX ORDER — AMLODIPINE BESYLATE 5 MG/1
5 TABLET ORAL DAILY
Qty: 90 TABLET | Refills: 1 | Status: SHIPPED | OUTPATIENT
Start: 2024-12-23

## 2024-12-26 ENCOUNTER — OFFICE VISIT (OUTPATIENT)
Dept: CARDIOLOGY | Facility: CLINIC | Age: 74
End: 2024-12-26
Payer: MEDICARE

## 2024-12-26 VITALS
DIASTOLIC BLOOD PRESSURE: 84 MMHG | HEIGHT: 60 IN | OXYGEN SATURATION: 100 % | WEIGHT: 132.2 LBS | SYSTOLIC BLOOD PRESSURE: 140 MMHG | HEART RATE: 78 BPM | BODY MASS INDEX: 25.95 KG/M2

## 2024-12-26 DIAGNOSIS — I44.7 LBBB (LEFT BUNDLE BRANCH BLOCK): ICD-10-CM

## 2024-12-26 DIAGNOSIS — E03.9 HYPOTHYROIDISM (ACQUIRED): ICD-10-CM

## 2024-12-26 DIAGNOSIS — I47.19 ATRIAL TACHYCARDIA: ICD-10-CM

## 2024-12-26 DIAGNOSIS — Z78.9 STATIN INTOLERANCE: ICD-10-CM

## 2024-12-26 DIAGNOSIS — I10 ESSENTIAL HYPERTENSION: ICD-10-CM

## 2024-12-26 DIAGNOSIS — E78.2 MIXED HYPERLIPIDEMIA: Primary | ICD-10-CM

## 2024-12-26 DIAGNOSIS — I38 VALVULAR HEART DISEASE: ICD-10-CM

## 2024-12-26 LAB
BH CV NUCLEAR PRIOR STUDY: 3
BH CV REST NUCLEAR ISOTOPE DOSE: 7.8 MCI
BH CV STRESS BP STAGE 1: NORMAL
BH CV STRESS COMMENTS STAGE 1: NORMAL
BH CV STRESS DOSE REGADENOSON STAGE 1: 0.4
BH CV STRESS DURATION MIN STAGE 1: 0
BH CV STRESS DURATION SEC STAGE 1: 10
BH CV STRESS HR STAGE 1: 88
BH CV STRESS NUCLEAR ISOTOPE DOSE: 24 MCI
BH CV STRESS PROTOCOL 1: NORMAL
BH CV STRESS RECOVERY BP: NORMAL MMHG
BH CV STRESS RECOVERY HR: 79 BPM
BH CV STRESS STAGE 1: 1
MAXIMAL PREDICTED HEART RATE: 148 BPM
PERCENT MAX PREDICTED HR: 59.46 %
SPECT HRT GATED+EF W RNC IV: 74 %
STRESS BASELINE BP: NORMAL MMHG
STRESS BASELINE HR: 68 BPM
STRESS PERCENT HR: 70 %
STRESS POST PEAK BP: NORMAL MMHG
STRESS POST PEAK HR: 88 BPM
STRESS TARGET HR: 126 BPM

## 2024-12-26 PROCEDURE — 3079F DIAST BP 80-89 MM HG: CPT | Performed by: INTERNAL MEDICINE

## 2024-12-26 PROCEDURE — 99214 OFFICE O/P EST MOD 30 MIN: CPT | Performed by: INTERNAL MEDICINE

## 2024-12-26 PROCEDURE — 3074F SYST BP LT 130 MM HG: CPT | Performed by: INTERNAL MEDICINE

## 2024-12-26 NOTE — PROGRESS NOTES
subjective     Chief Complaint   Patient presents with    Follow-up    Med Management     Verbal. No changes          Problems Addressed This Visit  1. Mixed hyperlipidemia    2. Essential hypertension    3. Hypothyroidism (acquired)    4. LBBB (left bundle branch block)    5. Valvular heart disease, mild mitral and tricuspid regurgitation with moderate pulmonary hypertension.    6. Atrial tachycardia    7. Statin intolerance and (no rhabdomyolysis ,no liver function abnormalities)        History of Present Illness    Chasity is 74 years old white female who is here for cardiology follow-up.  She has history of left bundle branch block, nonobstructive coronary artery disease, hypertension hyperlipidemia and atrial tachycardia.  She also has history of mild mitral and tricuspid regurgitation with moderate pulmonary hypertension.    She recently had a stress test done and is here for follow-up.    Blood pressure is very well-controlled.  She is taking Norvasc 5 mg daily, hydralazine 50 mg 3 times daily, Toprol-XL 37.5 mg daily, Benicar 40 mg daily, Aldactone 12.5 mg daily.    Hyperlipidemia  She is taking questron and Zetia.  She is statin intolerant however she is keeping her lipids fairly well-controlled.    Hypothyroidism on Synthroid replacement therapy.    She also has history of COPD and is doing very well with Advair.      Past Surgical History:   Procedure Laterality Date    CARDIAC CATHETERIZATION      CARDIAC CATHETERIZATION  2015    CARDIOVASCULAR STRESS TEST  2015     SECTION      x 3     ECHO - CONVERTED  2015    THYROID SURGERY      age 14+ 15, for cysts     Family History   Problem Relation Age of Onset    Lung cancer Mother     Stroke Father      Past Medical History:   Diagnosis Date    Aortic insufficiency     Asthma     Disease of thyroid gland     Essential hypertension     SOB (shortness of breath)      Patient Active Problem List   Diagnosis    Essential hypertension     Hypothyroidism (acquired)    Gastroesophageal reflux disease without esophagitis    Hyperlipidemia    LBBB (left bundle branch block)    Valvular heart disease, mild mitral and tricuspid regurgitation with moderate pulmonary hypertension.    Hypokalemia    Chronic chest pain with low to moderate risk for CAD    Statin intolerance and (no rhabdomyolysis ,no liver function abnormalities)    Nonobstructive coronary artery disease 2015    Bradycardia    Acute URI    Atrial tachycardia       Social History     Tobacco Use    Smoking status: Never    Smokeless tobacco: Never   Vaping Use    Vaping status: Never Used   Substance Use Topics    Alcohol use: No    Drug use: No       Allergies   Allergen Reactions    Aspirin Anaphylaxis    Iron Shortness Of Breath    Losartan Hives, Itching and Swelling    Clonidine Unknown (See Comments)    Egg-Derived Products     Ace Inhibitors Rash    Albuterol Rash    Lisinopril Rash       Current Outpatient Medications on File Prior to Visit   Medication Sig    amLODIPine (NORVASC) 5 MG tablet Take 1 tablet by mouth Daily.    cetirizine (ZyrTEC) 10 MG tablet Take 1 tablet by mouth Daily.    cholestyramine (QUESTRAN) 4 g packet Take 1 packet by mouth 2 (Two) Times a Day.    ezetimibe (ZETIA) 10 MG tablet TAKE 1 TABLET BY MOUTH ONCE DAILY    fluticasone-salmeterol (ADVAIR) 250-50 MCG/DOSE DISKUS Inhale 2 (two) times a day.    hydrALAZINE (APRESOLINE) 50 MG tablet TAKE 1 TABLET BY MOUTH 3 TIMES A DAY    levothyroxine (SYNTHROID, LEVOTHROID) 50 MCG tablet TAKE 1 TABLET BY MOUTH ONCE DAILY EVERY MORNING    metoprolol succinate XL (TOPROL-XL) 25 MG 24 hr tablet Take 1.5 tablets by mouth Daily.    Multiple Vitamin (MULTI VITAMIN DAILY PO) Take  by mouth.    nitroglycerin (NITROSTAT) 0.4 MG SL tablet     olmesartan (BENICAR) 40 MG tablet TAKE 1 TABLET BY MOUTH ONCE DAILY    spironolactone (ALDACTONE) 25 MG tablet TAKE 1/2 TABLET BY MOUTH ONCE DAILY     No current facility-administered  medications on file prior to visit.     (Not in a hospital admission)      Results for orders placed during the hospital encounter of 01/25/24    Adult Transthoracic Echo Complete W/ Cont if Necessary Per Protocol    Interpretation Summary    Normal left ventricular cavity size with borderline concentric hypertrophy noted.    Left ventricular systolic function is normal.  LV ejection fraction is around 60%.    Left ventricular diastolic function is consistent with (grade I) impaired relaxation.    Aortic valve is trileaflet valve, there is no evidence of aortic stenosis or aortic regurgitation detected.    Mild mitral regurgitation with mildly dilated left atrium noted.    Mild tricuspid regurgitation with mild to moderate pulmonary hypertension is noted.  Peak RV systolic pressure is around 50 mmHg.    Mild pulmonic regurgitation is noted.    No pericardial effusion detected.    Results for orders placed during the hospital encounter of 10/25/24    Stress Test With Myocardial Perfusion One Day    Interpretation Summary    A pharmacological stress test was performed using regadenoson without low-level exercise.    Resting EKG showed sinus rhythm at a rate of 68 bpm, left anterior fascicular block, nonspecific intraventricular conduction delay and nonspecific ST and T wave changes were noted.  Occasional PACs were also noted.  Old septal myocardial infarction cannot be excluded.    ST segments did not show any diagnostic changes.  No significant arrhythmia detected.    Left ventricular ejection fraction is hyperdynamic (Calculated EF > 70%).  Because of hotspot below the diaphragm, inferior wall motion is abnormal however LV ejection fraction is normal.    Exercise images show mildly decreased uptake involving the mid anteroseptal segment and mid inferolateral segment of the left ventricle.  Exercise images show reversibility in this area,TID 1.17    Myocardial perfusion imaging indicates a small-sized, mildly severe  "area of ischemia located in the basal inferior lateral wall and septal wall.    Impressions are consistent with an intermediate risk study.    Compared to the prior study from 2/10/2017 the current study reveals no changes.          The following portions of the patient's history were reviewed and updated as appropriate: allergies, current medications, past family history, past medical history, past social history, past surgical history and problem list.    Review of Systems   Constitutional: Negative.   HENT: Negative.  Negative for congestion.    Eyes: Negative.    Cardiovascular: Negative.  Negative for chest pain, cyanosis, dyspnea on exertion, irregular heartbeat, leg swelling, near-syncope, orthopnea, palpitations, paroxysmal nocturnal dyspnea and syncope.   Respiratory: Negative.  Negative for shortness of breath.    Hematologic/Lymphatic: Negative.    Musculoskeletal: Negative.    Gastrointestinal: Negative.    Neurological: Negative.  Negative for headaches.          Objective:     /84   Pulse 78   Ht 152.4 cm (60\")   Wt 60 kg (132 lb 3.2 oz)   SpO2 100%   BMI 25.82 kg/m²   Pulmonary:      Effort: Pulmonary effort is normal.      Breath sounds: Normal breath sounds. No stridor. No wheezing. No rhonchi. No rales.   Cardiovascular:      PMI at left midclavicular line. Normal rate. Regular rhythm. Normal S1. Normal S2.       Murmurs: There is no murmur.      No gallop.  No click. No rub.   Pulses:     Intact distal pulses.   Edema:     Peripheral edema absent.           Lab Review  Lab Results   Component Value Date     04/23/2024    K 4.6 04/23/2024     04/23/2024    BUN 15 04/23/2024    CREATININE 0.85 04/23/2024    GLUCOSE 91 04/23/2024    CALCIUM 9.3 04/23/2024    ALT 8 04/23/2024    ALKPHOS 102 04/23/2024    LABIL2 1.5 05/19/2015     Lab Results   Component Value Date    CKTOTAL 80 08/07/2023     Lab Results   Component Value Date    WBC 8.00 04/23/2024    HGB 11.0 (L) 04/23/2024    " "HCT 36.4 04/23/2024     04/23/2024     Lab Results   Component Value Date    INR 0.89 01/17/2017     Lab Results   Component Value Date    MG 2.0 07/23/2015     Lab Results   Component Value Date    TSH 4.170 04/23/2024     Lab Results   Component Value Date    BNP 28.0 09/05/2018     Lab Results   Component Value Date    CHLPL 192 05/19/2015    CHOL 207 (H) 04/23/2024    TRIG 80 04/23/2024    HDL 80 (H) 04/23/2024    VLDL 14 04/23/2024     (H) 04/23/2024     Lab Results   Component Value Date     (H) 04/23/2024    LDL 97 08/07/2023     No results found for: \"PROBNP\"            Procedures       I personally viewed and interpreted the patient's LAB data         Assessment:     1. Mixed hyperlipidemia    2. Essential hypertension    3. Hypothyroidism (acquired)    4. LBBB (left bundle branch block)    5. Valvular heart disease, mild mitral and tricuspid regurgitation with moderate pulmonary hypertension.    6. Atrial tachycardia    7. Statin intolerance and (no rhabdomyolysis ,no liver function abnormalities)          Plan:     Hyperlipidemia  Patient is statin intolerant.  She is taking Zetia and questron.  Last LDL was 113 which is elevated.  She will have lab work done again and we will try PCSK9 inhibitor therapy.    Hypertension is very well-controlled.  No change in therapy was made.    Hypothyroidism clinically euthyroid Synthroid was continued.    Atrial tachycardia.  She denies any palpitations doing very well with metoprolol ER 37.5 mg daily.    Valvular heart disease mild mitral and tricuspid regurgitation moderate pulmonary hypertension.  She has COPD and is taking Advair overall she is doing very well.    Stress test results were discussed with the patient very mild anteroseptal and inferolateral reversibility was noted however it is not changed any since her last stress test in 2017.  Medical management will be continued.    Follow-up scheduled        No follow-ups on file.  "

## 2025-01-20 RX ORDER — LEVOTHYROXINE SODIUM 50 UG/1
TABLET ORAL
Qty: 120 TABLET | Refills: 0 | Status: SHIPPED | OUTPATIENT
Start: 2025-01-20

## 2025-02-06 RX ORDER — HYDRALAZINE HYDROCHLORIDE 50 MG/1
50 TABLET, FILM COATED ORAL 3 TIMES DAILY
Qty: 270 TABLET | Refills: 1 | Status: SHIPPED | OUTPATIENT
Start: 2025-02-06

## 2025-03-12 ENCOUNTER — LAB (OUTPATIENT)
Dept: LAB | Facility: HOSPITAL | Age: 75
End: 2025-03-12
Payer: MEDICARE

## 2025-03-12 DIAGNOSIS — E78.2 MIXED HYPERLIPIDEMIA: ICD-10-CM

## 2025-03-12 LAB
ALBUMIN SERPL-MCNC: 4 G/DL (ref 3.5–5.2)
ALBUMIN/GLOB SERPL: 1.3 G/DL
ALP SERPL-CCNC: 97 U/L (ref 39–117)
ALT SERPL W P-5'-P-CCNC: 7 U/L (ref 1–33)
ANION GAP SERPL CALCULATED.3IONS-SCNC: 12.2 MMOL/L (ref 5–15)
AST SERPL-CCNC: 17 U/L (ref 1–32)
BASOPHILS # BLD AUTO: 0.05 10*3/MM3 (ref 0–0.2)
BASOPHILS NFR BLD AUTO: 0.8 % (ref 0–1.5)
BILIRUB SERPL-MCNC: 0.4 MG/DL (ref 0–1.2)
BUN SERPL-MCNC: 9 MG/DL (ref 8–23)
BUN/CREAT SERPL: 11.3 (ref 7–25)
CALCIUM SPEC-SCNC: 9.2 MG/DL (ref 8.6–10.5)
CHLORIDE SERPL-SCNC: 104 MMOL/L (ref 98–107)
CHOLEST SERPL-MCNC: 197 MG/DL (ref 0–200)
CO2 SERPL-SCNC: 23.8 MMOL/L (ref 22–29)
CREAT SERPL-MCNC: 0.8 MG/DL (ref 0.57–1)
DEPRECATED RDW RBC AUTO: 52.4 FL (ref 37–54)
EGFRCR SERPLBLD CKD-EPI 2021: 78.4 ML/MIN/1.73
EOSINOPHIL # BLD AUTO: 0.19 10*3/MM3 (ref 0–0.4)
EOSINOPHIL NFR BLD AUTO: 3 % (ref 0.3–6.2)
ERYTHROCYTE [DISTWIDTH] IN BLOOD BY AUTOMATED COUNT: 17 % (ref 12.3–15.4)
GLOBULIN UR ELPH-MCNC: 3 GM/DL
GLUCOSE SERPL-MCNC: 83 MG/DL (ref 65–99)
HCT VFR BLD AUTO: 37 % (ref 34–46.6)
HDLC SERPL-MCNC: 75 MG/DL (ref 40–60)
HGB BLD-MCNC: 11.2 G/DL (ref 12–15.9)
IMM GRANULOCYTES # BLD AUTO: 0.01 10*3/MM3 (ref 0–0.05)
IMM GRANULOCYTES NFR BLD AUTO: 0.2 % (ref 0–0.5)
LDLC SERPL CALC-MCNC: 109 MG/DL (ref 0–100)
LDLC/HDLC SERPL: 1.44 {RATIO}
LYMPHOCYTES # BLD AUTO: 1.99 10*3/MM3 (ref 0.7–3.1)
LYMPHOCYTES NFR BLD AUTO: 31 % (ref 19.6–45.3)
MCH RBC QN AUTO: 25.6 PG (ref 26.6–33)
MCHC RBC AUTO-ENTMCNC: 30.3 G/DL (ref 31.5–35.7)
MCV RBC AUTO: 84.5 FL (ref 79–97)
MONOCYTES # BLD AUTO: 0.58 10*3/MM3 (ref 0.1–0.9)
MONOCYTES NFR BLD AUTO: 9 % (ref 5–12)
NEUTROPHILS NFR BLD AUTO: 3.6 10*3/MM3 (ref 1.7–7)
NEUTROPHILS NFR BLD AUTO: 56 % (ref 42.7–76)
PLATELET # BLD AUTO: 176 10*3/MM3 (ref 140–450)
POTASSIUM SERPL-SCNC: 4.1 MMOL/L (ref 3.5–5.2)
PROT SERPL-MCNC: 7 G/DL (ref 6–8.5)
RBC # BLD AUTO: 4.38 10*6/MM3 (ref 3.77–5.28)
SODIUM SERPL-SCNC: 140 MMOL/L (ref 136–145)
TRIGL SERPL-MCNC: 70 MG/DL (ref 0–150)
VLDLC SERPL-MCNC: 13 MG/DL (ref 5–40)
WBC NRBC COR # BLD AUTO: 6.42 10*3/MM3 (ref 3.4–10.8)

## 2025-03-12 PROCEDURE — 85025 COMPLETE CBC W/AUTO DIFF WBC: CPT

## 2025-03-12 PROCEDURE — 80053 COMPREHEN METABOLIC PANEL: CPT

## 2025-03-12 PROCEDURE — 80061 LIPID PANEL: CPT

## 2025-03-13 RX ORDER — METOPROLOL SUCCINATE 25 MG/1
37.5 TABLET, EXTENDED RELEASE ORAL DAILY
Qty: 135 TABLET | Refills: 1 | Status: SHIPPED | OUTPATIENT
Start: 2025-03-13

## 2025-03-20 RX ORDER — OLMESARTAN MEDOXOMIL 40 MG/1
40 TABLET ORAL DAILY
Qty: 90 TABLET | Refills: 1 | Status: SHIPPED | OUTPATIENT
Start: 2025-03-20

## 2025-03-24 ENCOUNTER — OFFICE VISIT (OUTPATIENT)
Dept: CARDIOLOGY | Facility: CLINIC | Age: 75
End: 2025-03-24
Payer: MEDICARE

## 2025-03-24 VITALS
BODY MASS INDEX: 25.72 KG/M2 | WEIGHT: 131 LBS | DIASTOLIC BLOOD PRESSURE: 94 MMHG | SYSTOLIC BLOOD PRESSURE: 146 MMHG | OXYGEN SATURATION: 99 % | HEART RATE: 63 BPM | HEIGHT: 60 IN

## 2025-03-24 DIAGNOSIS — I10 ESSENTIAL HYPERTENSION: ICD-10-CM

## 2025-03-24 DIAGNOSIS — Z78.9 STATIN INTOLERANCE: ICD-10-CM

## 2025-03-24 DIAGNOSIS — I47.19 ATRIAL TACHYCARDIA: ICD-10-CM

## 2025-03-24 DIAGNOSIS — E78.2 MIXED HYPERLIPIDEMIA: Primary | ICD-10-CM

## 2025-03-24 DIAGNOSIS — E03.9 HYPOTHYROIDISM (ACQUIRED): ICD-10-CM

## 2025-03-24 NOTE — PROGRESS NOTES
subjective     Chief Complaint   Patient presents with    Results     labs       Problems Addressed This Visit  1. Mixed hyperlipidemia    2. Essential hypertension    3. Hypothyroidism (acquired)    4. Statin intolerance and (no rhabdomyolysis ,no liver function abnormalities)    5. Atrial tachycardia        History of Present Illness    Patient is 74 years old white female who is here for cardiology follow-up.  She has been having mild chest discomfort which is located in the left anterior chest , no radiation of the pain.  Pain is not related to exertion.  She underwent ischemic workup had a stress test done which was abnormal but stable.    Patient gets tired fatigued and gets short of breath.  She has known coronary artery disease which was nonobstructive last coronary angiography in .  She has multiple risk factors.    Hyperlipidemia with statin intolerance.  She is taking questron and Zetia lipid control is suboptimal.  She is willing to try PCSK9 inhibitor therapy at this time.    Hypertension blood pressure is elevated medication will be adjusted she will be taking Benicar Aldactone hydralazine and Norvasc.    Hypothyroidism clinically euthyroid she is taking Synthroid 50 mcg daily.    History of short runs of atrial tachycardia doing very well with beta-blocker therapy she is taking metoprolol ER 37.5 mg daily.      Past Surgical History:   Procedure Laterality Date    CARDIAC CATHETERIZATION      CARDIAC CATHETERIZATION  2015    CARDIOVASCULAR STRESS TEST  2015     SECTION      x 3     ECHO - CONVERTED  2015    THYROID SURGERY      age 16+ 15, for cysts     Family History   Problem Relation Age of Onset    Lung cancer Mother     Stroke Father      Past Medical History:   Diagnosis Date    Aortic insufficiency     Asthma     Disease of thyroid gland     Essential hypertension     SOB (shortness of breath)      Patient Active Problem List   Diagnosis    Essential hypertension     Hypothyroidism (acquired)    Gastroesophageal reflux disease without esophagitis    Hyperlipidemia    LBBB (left bundle branch block)    Valvular heart disease, mild mitral and tricuspid regurgitation with moderate pulmonary hypertension.    Hypokalemia    Chronic chest pain with low to moderate risk for CAD    Statin intolerance and (no rhabdomyolysis ,no liver function abnormalities)    Nonobstructive coronary artery disease 2015    Bradycardia    Acute URI    Atrial tachycardia       Social History     Tobacco Use    Smoking status: Never    Smokeless tobacco: Never   Vaping Use    Vaping status: Never Used   Substance Use Topics    Alcohol use: No    Drug use: No       Allergies   Allergen Reactions    Aspirin Anaphylaxis    Iron Shortness Of Breath    Losartan Hives, Itching and Swelling    Clonidine Unknown (See Comments)    Egg-Derived Products     Ace Inhibitors Rash    Albuterol Rash    Lisinopril Rash       Current Outpatient Medications on File Prior to Visit   Medication Sig    cetirizine (ZyrTEC) 10 MG tablet Take 1 tablet by mouth Daily.    fluticasone-salmeterol (ADVAIR) 250-50 MCG/DOSE DISKUS Inhale 2 (two) times a day.    Multiple Vitamin (MULTI VITAMIN DAILY PO) Take  by mouth.    nitroglycerin (NITROSTAT) 0.4 MG SL tablet     [DISCONTINUED] amLODIPine (NORVASC) 5 MG tablet Take 1 tablet by mouth Daily.    [DISCONTINUED] cholestyramine (QUESTRAN) 4 g packet Take 1 packet by mouth 2 (Two) Times a Day.    [DISCONTINUED] ezetimibe (ZETIA) 10 MG tablet TAKE 1 TABLET BY MOUTH ONCE DAILY    [DISCONTINUED] hydrALAZINE (APRESOLINE) 50 MG tablet TAKE 1 TABLET BY MOUTH 3 TIMES A DAY    [DISCONTINUED] levothyroxine (SYNTHROID, LEVOTHROID) 50 MCG tablet TAKE 1 TABLET BY MOUTH ONCE DAILY EVERY MORNING    [DISCONTINUED] metoprolol succinate XL (TOPROL-XL) 25 MG 24 hr tablet TAKE 1 & 1/2 TABLETS BY MOUTH ONCE DAILY    [DISCONTINUED] olmesartan (BENICAR) 40 MG tablet TAKE 1 TABLET BY MOUTH ONCE DAILY     [DISCONTINUED] spironolactone (ALDACTONE) 25 MG tablet TAKE 1/2 TABLET BY MOUTH ONCE DAILY     No current facility-administered medications on file prior to visit.     (Not in a hospital admission)      Results for orders placed during the hospital encounter of 01/25/24    Adult Transthoracic Echo Complete W/ Cont if Necessary Per Protocol    Interpretation Summary    Normal left ventricular cavity size with borderline concentric hypertrophy noted.    Left ventricular systolic function is normal.  LV ejection fraction is around 60%.    Left ventricular diastolic function is consistent with (grade I) impaired relaxation.    Aortic valve is trileaflet valve, there is no evidence of aortic stenosis or aortic regurgitation detected.    Mild mitral regurgitation with mildly dilated left atrium noted.    Mild tricuspid regurgitation with mild to moderate pulmonary hypertension is noted.  Peak RV systolic pressure is around 50 mmHg.    Mild pulmonic regurgitation is noted.    No pericardial effusion detected.    Results for orders placed during the hospital encounter of 10/25/24    Stress Test With Myocardial Perfusion One Day    Interpretation Summary    A pharmacological stress test was performed using regadenoson without low-level exercise.    Resting EKG showed sinus rhythm at a rate of 68 bpm, left anterior fascicular block, nonspecific intraventricular conduction delay and nonspecific ST and T wave changes were noted.  Occasional PACs were also noted.  Old septal myocardial infarction cannot be excluded.    ST segments did not show any diagnostic changes.  No significant arrhythmia detected.    Left ventricular ejection fraction is hyperdynamic (Calculated EF > 70%).  Because of hotspot below the diaphragm, inferior wall motion is abnormal however LV ejection fraction is normal.    Exercise images show mildly decreased uptake involving the mid anteroseptal segment and mid inferolateral segment of the left ventricle.   "Exercise images show reversibility in this area,TID 1.17    Myocardial perfusion imaging indicates a small-sized, mildly severe area of ischemia located in the basal inferior lateral wall and septal wall.    Impressions are consistent with an intermediate risk study.    Compared to the prior study from 2/10/2017 the current study reveals no changes.          The following portions of the patient's history were reviewed and updated as appropriate: allergies, current medications, past family history, past medical history, past social history, past surgical history and problem list.    Review of Systems   Constitutional: Positive for malaise/fatigue.   HENT: Negative.  Negative for congestion.    Eyes: Negative.    Cardiovascular:  Positive for chest pain. Negative for cyanosis, dyspnea on exertion, irregular heartbeat, leg swelling, near-syncope, orthopnea, palpitations, paroxysmal nocturnal dyspnea and syncope.   Respiratory:  Positive for shortness of breath.    Hematologic/Lymphatic: Negative.    Musculoskeletal: Negative.    Gastrointestinal: Negative.    Neurological: Negative.  Negative for headaches.          Objective:     /94 (BP Location: Left arm, Patient Position: Sitting, Cuff Size: Adult)   Pulse 63   Ht 152.4 cm (60\")   Wt 59.4 kg (131 lb)   SpO2 99%   BMI 25.58 kg/m²   Pulmonary:      Effort: Pulmonary effort is normal.      Breath sounds: Normal breath sounds. No stridor. No wheezing. No rhonchi. No rales.   Cardiovascular:      PMI at left midclavicular line. Normal rate. Regular rhythm. Normal S1. Normal S2.       Murmurs: There is no murmur.      No gallop.  No click. No rub.   Pulses:     Intact distal pulses.   Edema:     Peripheral edema absent.           Lab Review  Lab Results   Component Value Date     03/12/2025    K 4.1 03/12/2025     03/12/2025    BUN 9 03/12/2025    CREATININE 0.80 03/12/2025    GLUCOSE 83 03/12/2025    CALCIUM 9.2 03/12/2025    ALT 7 03/12/2025    " "ALKPHOS 97 03/12/2025     Lab Results   Component Value Date    CKTOTAL 80 08/07/2023     Lab Results   Component Value Date    WBC 6.42 03/12/2025    HGB 11.2 (L) 03/12/2025    HCT 37.0 03/12/2025     03/12/2025     Lab Results   Component Value Date    INR 0.89 01/17/2017     Lab Results   Component Value Date    MG 2.0 07/23/2015     Lab Results   Component Value Date    TSH 4.170 04/23/2024     Lab Results   Component Value Date    BNP 28.0 09/05/2018     Lab Results   Component Value Date    CHLPL 192 05/19/2015    CHOL 197 03/12/2025    TRIG 70 03/12/2025    HDL 75 (H) 03/12/2025    VLDL 13 03/12/2025     (H) 03/12/2025     Lab Results   Component Value Date     (H) 03/12/2025     (H) 04/23/2024     No results found for: \"PROBNP\"            Procedures       I personally viewed and interpreted the patient's LAB data         Assessment:     1. Mixed hyperlipidemia    2. Essential hypertension    3. Hypothyroidism (acquired)    4. Statin intolerance and (no rhabdomyolysis ,no liver function abnormalities)    5. Atrial tachycardia          Plan:     Hyperlipidemia with statin intolerance.  Patient had lab work done she is taking questron and Zetia total cholesterol is 197 with LDL of 109.  Patient has multiple risk factors for coronary artery disease with ongoing mild chest discomfort and abnormal stress test better lipid control is required.  Patient is now willing to take PCSK9 inhibitor therapy will arrange Repatha.  She will discontinue questron however she will continue Zetia.    Hypertension blood pressure is mildly elevated she was advised to keep a record of her blood pressure and take medications regularly.  She will take metoprolol ER 37.5 mg daily, Norvasc 5 mg daily, hydralazine 53 times a day, Benicar 40 mg daily and Aldactone 12.5 mg daily.  Depending on her blood pressure readings will increase Norvasc.    Hypothyroidism  Clinically euthyroid she will continue Synthroid " 50 mcg daily.    History of atrial tachycardia denies any palpitations at this time.  Heart rate is 63 bpm she will continue metoprolol.    Follow-up scheduled.  If she has worsening of cardiac symptoms we will need to arrange coronary angiography.  Aggressive risk factor modification emphasized.          No follow-ups on file.

## 2025-03-26 RX ORDER — METOPROLOL SUCCINATE 25 MG/1
37.5 TABLET, EXTENDED RELEASE ORAL DAILY
Qty: 135 TABLET | Refills: 1 | Status: SHIPPED | OUTPATIENT
Start: 2025-03-26

## 2025-03-26 RX ORDER — SPIRONOLACTONE 25 MG/1
12.5 TABLET ORAL DAILY
Qty: 45 TABLET | Refills: 1 | Status: SHIPPED | OUTPATIENT
Start: 2025-03-26

## 2025-03-26 RX ORDER — AMLODIPINE BESYLATE 5 MG/1
5 TABLET ORAL DAILY
Qty: 90 TABLET | Refills: 1 | Status: SHIPPED | OUTPATIENT
Start: 2025-03-26

## 2025-03-26 RX ORDER — LEVOTHYROXINE SODIUM 50 UG/1
50 TABLET ORAL
Qty: 120 TABLET | Refills: 0 | Status: SHIPPED | OUTPATIENT
Start: 2025-03-26

## 2025-03-26 RX ORDER — OLMESARTAN MEDOXOMIL 40 MG/1
40 TABLET ORAL DAILY
Qty: 90 TABLET | Refills: 1 | Status: SHIPPED | OUTPATIENT
Start: 2025-03-26

## 2025-03-26 RX ORDER — EZETIMIBE 10 MG/1
10 TABLET ORAL DAILY
Qty: 90 TABLET | Refills: 1 | Status: SHIPPED | OUTPATIENT
Start: 2025-03-26

## 2025-03-26 RX ORDER — HYDRALAZINE HYDROCHLORIDE 50 MG/1
50 TABLET, FILM COATED ORAL 3 TIMES DAILY
Qty: 270 TABLET | Refills: 1 | Status: SHIPPED | OUTPATIENT
Start: 2025-03-26

## 2025-05-04 DIAGNOSIS — I10 ESSENTIAL HYPERTENSION: ICD-10-CM

## 2025-05-05 RX ORDER — SPIRONOLACTONE 25 MG/1
12.5 TABLET ORAL DAILY
Qty: 45 TABLET | Refills: 1 | Status: SHIPPED | OUTPATIENT
Start: 2025-05-05

## 2025-05-20 DIAGNOSIS — E03.9 HYPOTHYROIDISM (ACQUIRED): ICD-10-CM

## 2025-05-20 RX ORDER — LEVOTHYROXINE SODIUM 50 UG/1
TABLET ORAL
Qty: 120 TABLET | Refills: 0 | Status: SHIPPED | OUTPATIENT
Start: 2025-05-20

## 2025-06-10 ENCOUNTER — LAB (OUTPATIENT)
Dept: LAB | Facility: HOSPITAL | Age: 75
End: 2025-06-10
Payer: MEDICARE

## 2025-06-10 DIAGNOSIS — E78.2 MIXED HYPERLIPIDEMIA: ICD-10-CM

## 2025-06-10 LAB
CHOLEST SERPL-MCNC: 170 MG/DL (ref 0–200)
HDLC SERPL-MCNC: 80 MG/DL (ref 40–60)
LDLC SERPL CALC-MCNC: 76 MG/DL (ref 0–100)
LDLC/HDLC SERPL: 0.94 {RATIO}
TRIGL SERPL-MCNC: 74 MG/DL (ref 0–150)
VLDLC SERPL-MCNC: 14 MG/DL (ref 5–40)

## 2025-06-10 PROCEDURE — 36415 COLL VENOUS BLD VENIPUNCTURE: CPT

## 2025-06-10 PROCEDURE — 80061 LIPID PANEL: CPT

## 2025-06-11 ENCOUNTER — OFFICE VISIT (OUTPATIENT)
Dept: CARDIOLOGY | Facility: CLINIC | Age: 75
End: 2025-06-11
Payer: MEDICARE

## 2025-06-11 VITALS
DIASTOLIC BLOOD PRESSURE: 76 MMHG | HEART RATE: 60 BPM | SYSTOLIC BLOOD PRESSURE: 118 MMHG | HEIGHT: 60 IN | OXYGEN SATURATION: 98 % | WEIGHT: 150 LBS | BODY MASS INDEX: 29.45 KG/M2

## 2025-06-11 DIAGNOSIS — E78.2 MIXED HYPERLIPIDEMIA: ICD-10-CM

## 2025-06-11 DIAGNOSIS — E03.9 HYPOTHYROIDISM (ACQUIRED): ICD-10-CM

## 2025-06-11 DIAGNOSIS — I47.19 ATRIAL TACHYCARDIA: ICD-10-CM

## 2025-06-11 DIAGNOSIS — I10 ESSENTIAL HYPERTENSION: Primary | ICD-10-CM

## 2025-06-11 PROCEDURE — 3078F DIAST BP <80 MM HG: CPT | Performed by: INTERNAL MEDICINE

## 2025-06-11 PROCEDURE — 99214 OFFICE O/P EST MOD 30 MIN: CPT | Performed by: INTERNAL MEDICINE

## 2025-06-11 PROCEDURE — 3074F SYST BP LT 130 MM HG: CPT | Performed by: INTERNAL MEDICINE

## 2025-06-11 NOTE — PROGRESS NOTES
subjective     Chief Complaint   Patient presents with    Results     Lipid panel       Problems Addressed This Visit  1. Essential hypertension    2. Mixed hyperlipidemia    3. Atrial tachycardia    4. Hypothyroidism (acquired)        History of Present Illness  History of Present Illness  The patient is a 75-year-old white female here for a cardiology follow-up. She has a history of hypertension, hyperlipidemia, atrial tachycardia, and hypothyroidism. She is also statin intolerant.    Her blood pressure is very well controlled, running around 120 systolic. She is asymptomatic with no episodes of hypotension. Her current medications include Toprol-XL 37.5 mg daily, Aldactone 12.5 mg daily, Benicar 40 mg daily, hydralazine 50 mg 3 times a day, and Norvasc 5 mg daily.    She has a history of atrial tachycardia and is doing very well with metoprolol ER 37.5 mg daily.    For her hypothyroidism, she is on Synthroid 50 mcg daily and is clinically euthyroid.    She has hyperlipidemia and is statin intolerant. She is taking Repatha along with Zetia and is doing very well. Recent lab work shows an LDL of 78 and total cholesterol of 170.    MEDICATIONS  Toprol-XL 37.5 mg daily, Aldactone 12.5 mg daily, Benicar 40 mg daily, hydralazine 50 mg t.i.d., Norvasc 5 mg daily, metoprolol ER 37.5 mg daily, Synthroid 50 mcg daily, Repatha, Zetia      Past Surgical History:   Procedure Laterality Date    CARDIAC CATHETERIZATION      CARDIAC CATHETERIZATION  2015    CARDIOVASCULAR STRESS TEST  2015     SECTION      x 3     ECHO - CONVERTED  2015    THYROID SURGERY      age 16+ 15, for cysts     Family History   Problem Relation Age of Onset    Lung cancer Mother     Stroke Father      Past Medical History:   Diagnosis Date    Aortic insufficiency     Asthma     Disease of thyroid gland     Essential hypertension     SOB (shortness of breath)      Patient Active Problem List   Diagnosis    Essential hypertension     Hypothyroidism (acquired)    Gastroesophageal reflux disease without esophagitis    Hyperlipidemia    LBBB (left bundle branch block)    Valvular heart disease, mild mitral and tricuspid regurgitation with moderate pulmonary hypertension.    Hypokalemia    Chronic chest pain with low to moderate risk for CAD    Statin intolerance and (no rhabdomyolysis ,no liver function abnormalities)    Nonobstructive coronary artery disease 2015    Bradycardia    Acute URI    Atrial tachycardia       Social History     Tobacco Use    Smoking status: Never    Smokeless tobacco: Never   Vaping Use    Vaping status: Never Used   Substance Use Topics    Alcohol use: No    Drug use: No       Allergies   Allergen Reactions    Aspirin Anaphylaxis    Iron Shortness Of Breath    Losartan Hives, Itching and Swelling    Clonidine Unknown (See Comments)    Egg-Derived Products     Ace Inhibitors Rash    Albuterol Rash    Lisinopril Rash       Current Outpatient Medications on File Prior to Visit   Medication Sig    amLODIPine (NORVASC) 5 MG tablet Take 1 tablet by mouth Daily.    cetirizine (ZyrTEC) 10 MG tablet Take 1 tablet by mouth Daily.    Evolocumab (REPATHA) solution auto-injector SureClick injection Inject 1 mL under the skin into the appropriate area as directed Every 14 (Fourteen) Days.    ezetimibe (ZETIA) 10 MG tablet Take 1 tablet by mouth Daily.    fluticasone-salmeterol (ADVAIR) 250-50 MCG/DOSE DISKUS Inhale 2 (two) times a day.    hydrALAZINE (APRESOLINE) 50 MG tablet Take 1 tablet by mouth 3 (Three) Times a Day.    levothyroxine (SYNTHROID, LEVOTHROID) 50 MCG tablet TAKE 1 TABLET BY MOUTH ONCE DAILY EVERY MORNING    metoprolol succinate XL (TOPROL-XL) 25 MG 24 hr tablet Take 1.5 tablets by mouth Daily.    Multiple Vitamin (MULTI VITAMIN DAILY PO) Take  by mouth.    nitroglycerin (NITROSTAT) 0.4 MG SL tablet     olmesartan (BENICAR) 40 MG tablet Take 1 tablet by mouth Daily.    spironolactone (ALDACTONE) 25 MG tablet TAKE 1/2  TABLET BY MOUTH ONCE DAILY     No current facility-administered medications on file prior to visit.     (Not in a hospital admission)      Results for orders placed during the hospital encounter of 01/25/24    Adult Transthoracic Echo Complete W/ Cont if Necessary Per Protocol    Interpretation Summary    Normal left ventricular cavity size with borderline concentric hypertrophy noted.    Left ventricular systolic function is normal.  LV ejection fraction is around 60%.    Left ventricular diastolic function is consistent with (grade I) impaired relaxation.    Aortic valve is trileaflet valve, there is no evidence of aortic stenosis or aortic regurgitation detected.    Mild mitral regurgitation with mildly dilated left atrium noted.    Mild tricuspid regurgitation with mild to moderate pulmonary hypertension is noted.  Peak RV systolic pressure is around 50 mmHg.    Mild pulmonic regurgitation is noted.    No pericardial effusion detected.    Results for orders placed during the hospital encounter of 10/25/24    Stress Test With Myocardial Perfusion One Day    Interpretation Summary    A pharmacological stress test was performed using regadenoson without low-level exercise.    Resting EKG showed sinus rhythm at a rate of 68 bpm, left anterior fascicular block, nonspecific intraventricular conduction delay and nonspecific ST and T wave changes were noted.  Occasional PACs were also noted.  Old septal myocardial infarction cannot be excluded.    ST segments did not show any diagnostic changes.  No significant arrhythmia detected.    Left ventricular ejection fraction is hyperdynamic (Calculated EF > 70%).  Because of hotspot below the diaphragm, inferior wall motion is abnormal however LV ejection fraction is normal.    Exercise images show mildly decreased uptake involving the mid anteroseptal segment and mid inferolateral segment of the left ventricle.  Exercise images show reversibility in this area,TID 1.17     "Myocardial perfusion imaging indicates a small-sized, mildly severe area of ischemia located in the basal inferior lateral wall and septal wall.    Impressions are consistent with an intermediate risk study.    Compared to the prior study from 2/10/2017 the current study reveals no changes.          The following portions of the patient's history were reviewed and updated as appropriate: allergies, current medications, past family history, past medical history, past social history, past surgical history and problem list.    Review of Systems   Constitutional: Negative.   HENT: Negative.  Negative for congestion.    Eyes: Negative.    Cardiovascular: Negative.  Negative for chest pain, cyanosis, dyspnea on exertion, irregular heartbeat, leg swelling, near-syncope, orthopnea, palpitations, paroxysmal nocturnal dyspnea and syncope.   Respiratory: Negative.  Negative for shortness of breath.    Hematologic/Lymphatic: Negative.    Musculoskeletal: Negative.    Gastrointestinal: Negative.    Neurological: Negative.  Negative for headaches.          Objective:     /76 (BP Location: Left arm, Patient Position: Sitting, Cuff Size: Adult)   Pulse 60   Ht 152.4 cm (60\")   Wt 68 kg (150 lb)   SpO2 98%   BMI 29.29 kg/m²   Pulmonary:      Effort: Pulmonary effort is normal.      Breath sounds: Normal breath sounds. No stridor. No wheezing. No rhonchi. No rales.   Cardiovascular:      PMI at left midclavicular line. Normal rate. Regular rhythm. Normal S1. Normal S2.       Murmurs: There is no murmur.      No gallop.  No click. No rub.   Pulses:     Intact distal pulses.   Edema:     Peripheral edema absent.       Physical Exam        Lab Review  Lab Results   Component Value Date     03/12/2025    K 4.1 03/12/2025     03/12/2025    BUN 9 03/12/2025    CREATININE 0.80 03/12/2025    GLUCOSE 83 03/12/2025    CALCIUM 9.2 03/12/2025    ALT 7 03/12/2025    ALKPHOS 97 03/12/2025     Lab Results   Component Value " "Date    CKTOTAL 80 08/07/2023     Lab Results   Component Value Date    WBC 6.42 03/12/2025    HGB 11.2 (L) 03/12/2025    HCT 37.0 03/12/2025     03/12/2025     Lab Results   Component Value Date    INR 0.89 01/17/2017     Lab Results   Component Value Date    MG 2.0 07/23/2015     Lab Results   Component Value Date    TSH 4.170 04/23/2024     Lab Results   Component Value Date    BNP 28.0 09/05/2018     Lab Results   Component Value Date    CHLPL 192 05/19/2015    CHOL 170 06/10/2025    TRIG 74 06/10/2025    HDL 80 (H) 06/10/2025    VLDL 14 06/10/2025    LDL 76 06/10/2025     Lab Results   Component Value Date    LDL 76 06/10/2025     (H) 03/12/2025     No results found for: \"PROBNP\"            Procedures    Results  Laboratory Studies  LDL is 78. Total cholesterol is 170.     I personally viewed and interpreted the patient's LAB data         Assessment:     1. Essential hypertension    2. Mixed hyperlipidemia    3. Atrial tachycardia    4. Hypothyroidism (acquired)        Assessment & Plan  Lab Results   Component Value Date    CHLPL 192 05/19/2015    CHOL 170 06/10/2025    TRIG 74 06/10/2025    HDL 80 (H) 06/10/2025    VLDL 14 06/10/2025    LDL 76 06/10/2025         1. Hypertension.  Her blood pressure is very well controlled, running around 120 systolic. She will continue her current regimen of metoprolol ER 37.5 mg daily, Aldactone 12.5 mg daily, Benicar 40 mg daily, hydralazine 50 mg t.i.d., and Norvasc 5 mg daily. She was advised to monitor her blood pressure closely. Recommendations for salt restriction and increased physical activity were discussed.    2. Hyperlipidemia with statin intolerance.  Her lipid control is excellent with an LDL of 78 and total cholesterol of 170. She will continue her current regimen of Repatha and Zetia. Dietary restrictions were discussed.    3. Hypothyroidism.  She is clinically euthyroid. She will continue her current regimen of Synthroid 50 mcg daily.    4. " Atrial tachycardia.  Her condition is well controlled with metoprolol ER 37.5 mg daily. Recent lab work was reviewed and discussed with her.            No follow-ups on file.

## 2025-06-19 DIAGNOSIS — I10 ESSENTIAL HYPERTENSION: ICD-10-CM

## 2025-06-19 DIAGNOSIS — E78.2 MIXED HYPERLIPIDEMIA: ICD-10-CM

## 2025-06-19 RX ORDER — AMLODIPINE BESYLATE 5 MG/1
5 TABLET ORAL DAILY
Qty: 90 TABLET | Refills: 1 | Status: SHIPPED | OUTPATIENT
Start: 2025-06-19

## 2025-06-19 RX ORDER — EZETIMIBE 10 MG/1
10 TABLET ORAL DAILY
Qty: 90 TABLET | Refills: 1 | Status: SHIPPED | OUTPATIENT
Start: 2025-06-19

## 2025-07-14 DIAGNOSIS — E78.2 MIXED HYPERLIPIDEMIA: ICD-10-CM

## 2025-07-14 RX ORDER — EVOLOCUMAB 140 MG/ML
INJECTION, SOLUTION SUBCUTANEOUS
Qty: 1 ML | Refills: 11 | Status: SHIPPED | OUTPATIENT
Start: 2025-07-14

## 2025-07-31 DIAGNOSIS — I10 ESSENTIAL HYPERTENSION: ICD-10-CM

## 2025-07-31 RX ORDER — HYDRALAZINE HYDROCHLORIDE 50 MG/1
50 TABLET, FILM COATED ORAL 3 TIMES DAILY
Qty: 270 TABLET | Refills: 1 | Status: SHIPPED | OUTPATIENT
Start: 2025-07-31